# Patient Record
Sex: FEMALE | Race: WHITE | NOT HISPANIC OR LATINO | Employment: OTHER | URBAN - METROPOLITAN AREA
[De-identification: names, ages, dates, MRNs, and addresses within clinical notes are randomized per-mention and may not be internally consistent; named-entity substitution may affect disease eponyms.]

---

## 2018-12-24 ENCOUNTER — HOSPITAL ENCOUNTER (EMERGENCY)
Facility: HOSPITAL | Age: 73
Discharge: HOME | DRG: 202 | End: 2018-12-24
Attending: EMERGENCY MEDICINE
Payer: MEDICARE

## 2018-12-24 ENCOUNTER — APPOINTMENT (EMERGENCY)
Dept: RADIOLOGY | Facility: HOSPITAL | Age: 73
DRG: 202 | End: 2018-12-24
Attending: EMERGENCY MEDICINE
Payer: MEDICARE

## 2018-12-24 VITALS
WEIGHT: 170 LBS | OXYGEN SATURATION: 94 % | HEART RATE: 92 BPM | HEIGHT: 58 IN | BODY MASS INDEX: 35.68 KG/M2 | DIASTOLIC BLOOD PRESSURE: 68 MMHG | TEMPERATURE: 99.5 F | SYSTOLIC BLOOD PRESSURE: 132 MMHG | RESPIRATION RATE: 22 BRPM

## 2018-12-24 DIAGNOSIS — J40 BRONCHITIS: ICD-10-CM

## 2018-12-24 DIAGNOSIS — J44.1 COPD EXACERBATION (CMS/HCC): Primary | ICD-10-CM

## 2018-12-24 LAB
ANION GAP SERPL CALC-SCNC: 10 MEQ/L (ref 3–15)
APTT PPP: 41 SEC (ref 23–35)
BASOPHILS # BLD: 0.03 K/UL (ref 0.01–0.1)
BASOPHILS NFR BLD: 0.2 %
BUN SERPL-MCNC: 16 MG/DL (ref 8–20)
CALCIUM SERPL-MCNC: 8.7 MG/DL (ref 8.9–10.3)
CHLORIDE SERPL-SCNC: 101 MEQ/L (ref 98–109)
CO2 SERPL-SCNC: 25 MEQ/L (ref 22–32)
CREAT SERPL-MCNC: 0.6 MG/DL
DIFFERENTIAL METHOD BLD: ABNORMAL
DIGOXIN SERPL-MCNC: 0.4 NG/ML (ref 0.5–2)
EOSINOPHIL # BLD: 0.06 K/UL (ref 0.04–0.36)
EOSINOPHIL NFR BLD: 0.5 %
ERYTHROCYTE [DISTWIDTH] IN BLOOD BY AUTOMATED COUNT: 13.7 % (ref 11.7–14.4)
FLUAV RNA SPEC QL NAA+PROBE: NEGATIVE
FLUBV RNA SPEC QL NAA+PROBE: NEGATIVE
GFR SERPL CREATININE-BSD FRML MDRD: >60 ML/MIN/1.73M*2
GLUCOSE SERPL-MCNC: 287 MG/DL (ref 70–99)
HCT VFR BLDCO AUTO: 44.1 %
HGB BLD-MCNC: 14.7 G/DL
IMM GRANULOCYTES # BLD AUTO: 0.07 K/UL (ref 0–0.08)
IMM GRANULOCYTES NFR BLD AUTO: 0.6 %
INR PPP: 1.7 INR
LACTATE SERPL-SCNC: 1.5 MMOL/L (ref 0.4–2)
LYMPHOCYTES # BLD: 1.26 K/UL (ref 1.2–3.5)
LYMPHOCYTES NFR BLD: 10.1 %
MAGNESIUM SERPL-MCNC: 1.7 MG/DL (ref 1.8–2.5)
MCH RBC QN AUTO: 30.3 PG (ref 28–33.2)
MCHC RBC AUTO-ENTMCNC: 33.3 G/DL (ref 32.2–35.5)
MCV RBC AUTO: 90.9 FL (ref 83–98)
MONOCYTES # BLD: 1.02 K/UL (ref 0.28–0.8)
MONOCYTES NFR BLD: 8.2 %
NEUTROPHILS # BLD: 10.01 K/UL (ref 1.7–7)
NEUTS SEG NFR BLD: 80.4 %
NRBC BLD-RTO: 0 %
PDW BLD AUTO: 10.5 FL (ref 9.4–12.3)
PLATELET # BLD AUTO: 247 K/UL
POTASSIUM SERPL-SCNC: 4.3 MEQ/L (ref 3.6–5.1)
PROTHROMBIN TIME: 19 SEC (ref 12.2–14.5)
RBC # BLD AUTO: 4.85 M/UL (ref 3.93–5.22)
RSV RNA SPEC QL NAA+PROBE: NEGATIVE
SODIUM SERPL-SCNC: 136 MEQ/L (ref 136–144)
TROPONIN I SERPL-MCNC: <0.02 NG/ML
WBC # BLD AUTO: 12.45 K/UL

## 2018-12-24 PROCEDURE — 80048 BASIC METABOLIC PNL TOTAL CA: CPT | Performed by: EMERGENCY MEDICINE

## 2018-12-24 PROCEDURE — 36415 COLL VENOUS BLD VENIPUNCTURE: CPT | Performed by: EMERGENCY MEDICINE

## 2018-12-24 PROCEDURE — 83605 ASSAY OF LACTIC ACID: CPT | Performed by: EMERGENCY MEDICINE

## 2018-12-24 PROCEDURE — 99284 EMERGENCY DEPT VISIT MOD MDM: CPT | Mod: 25

## 2018-12-24 PROCEDURE — 25800000 HC PHARMACY IV SOLUTIONS: Performed by: EMERGENCY MEDICINE

## 2018-12-24 PROCEDURE — 85025 COMPLETE CBC W/AUTO DIFF WBC: CPT | Performed by: EMERGENCY MEDICINE

## 2018-12-24 PROCEDURE — 25000000 HC PHARMACY GENERAL: Performed by: EMERGENCY MEDICINE

## 2018-12-24 PROCEDURE — 83735 ASSAY OF MAGNESIUM: CPT | Performed by: EMERGENCY MEDICINE

## 2018-12-24 PROCEDURE — 87631 RESP VIRUS 3-5 TARGETS: CPT | Performed by: EMERGENCY MEDICINE

## 2018-12-24 PROCEDURE — 94644 CONT INHLJ TX 1ST HOUR: CPT

## 2018-12-24 PROCEDURE — 85730 THROMBOPLASTIN TIME PARTIAL: CPT | Performed by: EMERGENCY MEDICINE

## 2018-12-24 PROCEDURE — 94640 AIRWAY INHALATION TREATMENT: CPT

## 2018-12-24 PROCEDURE — 63600000 HC DRUGS/DETAIL CODE: Performed by: EMERGENCY MEDICINE

## 2018-12-24 PROCEDURE — 85610 PROTHROMBIN TIME: CPT | Performed by: EMERGENCY MEDICINE

## 2018-12-24 PROCEDURE — 71045 X-RAY EXAM CHEST 1 VIEW: CPT

## 2018-12-24 PROCEDURE — 87040 BLOOD CULTURE FOR BACTERIA: CPT | Mod: 91 | Performed by: EMERGENCY MEDICINE

## 2018-12-24 PROCEDURE — 80162 ASSAY OF DIGOXIN TOTAL: CPT | Performed by: EMERGENCY MEDICINE

## 2018-12-24 PROCEDURE — 84484 ASSAY OF TROPONIN QUANT: CPT | Performed by: EMERGENCY MEDICINE

## 2018-12-24 PROCEDURE — 93005 ELECTROCARDIOGRAM TRACING: CPT | Performed by: EMERGENCY MEDICINE

## 2018-12-24 RX ORDER — PREDNISONE 50 MG/1
50 TABLET ORAL DAILY
Qty: 5 TABLET | Refills: 0 | Status: SHIPPED | OUTPATIENT
Start: 2018-12-24 | End: 2018-12-28 | Stop reason: HOSPADM

## 2018-12-24 RX ORDER — ALBUTEROL SULFATE 90 UG/1
2 INHALANT RESPIRATORY (INHALATION) EVERY 4 HOURS PRN
Qty: 1 INHALER | Refills: 0 | Status: ON HOLD | OUTPATIENT
Start: 2018-12-24 | End: 2018-12-28

## 2018-12-24 RX ORDER — METFORMIN HYDROCHLORIDE 500 MG/1
1000 TABLET ORAL 2 TIMES DAILY WITH MEALS
COMMUNITY
Start: 2018-12-24

## 2018-12-24 RX ORDER — AZITHROMYCIN 250 MG/1
250 TABLET, FILM COATED ORAL DAILY
Qty: 4 TABLET | Refills: 0 | Status: SHIPPED | OUTPATIENT
Start: 2018-12-25 | End: 2018-12-28 | Stop reason: HOSPADM

## 2018-12-24 RX ORDER — WARFARIN 2.5 MG/1
2.5 TABLET ORAL 2 TIMES WEEKLY
COMMUNITY
Start: 2018-12-21

## 2018-12-24 RX ORDER — ALBUTEROL SULFATE 2.5 MG/.5ML
7.5 SOLUTION RESPIRATORY (INHALATION) CONTINUOUS
Status: DISPENSED | OUTPATIENT
Start: 2018-12-24 | End: 2018-12-24

## 2018-12-24 RX ORDER — SITAGLIPTIN 100 MG/1
100 TABLET, FILM COATED ORAL DAILY
COMMUNITY
Start: 2018-12-24

## 2018-12-24 RX ORDER — METOPROLOL SUCCINATE 50 MG/1
50 TABLET, EXTENDED RELEASE ORAL DAILY
COMMUNITY
Start: 2018-12-07

## 2018-12-24 RX ORDER — MONTELUKAST SODIUM 10 MG/1
10 TABLET ORAL EVERY EVENING
Refills: 3 | COMMUNITY
Start: 2018-10-25

## 2018-12-24 RX ORDER — IPRATROPIUM BROMIDE 0.5 MG/2.5ML
0.5 SOLUTION RESPIRATORY (INHALATION) CONTINUOUS
Status: DISCONTINUED | OUTPATIENT
Start: 2018-12-24 | End: 2018-12-24 | Stop reason: HOSPADM

## 2018-12-24 RX ORDER — ATORVASTATIN CALCIUM 10 MG/1
10 TABLET, FILM COATED ORAL
Refills: 5 | COMMUNITY
Start: 2018-10-30

## 2018-12-24 RX ORDER — DIGOXIN 125 UG/1
125 TABLET ORAL DAILY
COMMUNITY
Start: 2018-12-21

## 2018-12-24 RX ADMIN — SODIUM CHLORIDE 1000 ML: 9 INJECTION, SOLUTION INTRAVENOUS at 16:15

## 2018-12-24 RX ADMIN — AZITHROMYCIN DIHYDRATE 500 MG: 500 INJECTION, POWDER, LYOPHILIZED, FOR SOLUTION INTRAVENOUS at 16:11

## 2018-12-24 RX ADMIN — METHYLPREDNISOLONE SODIUM SUCCINATE 60 MG: 125 INJECTION, POWDER, FOR SOLUTION INTRAMUSCULAR; INTRAVENOUS at 16:12

## 2018-12-24 RX ADMIN — IPRATROPIUM BROMIDE 0.5 MG: 0.5 SOLUTION RESPIRATORY (INHALATION) at 15:57

## 2018-12-24 RX ADMIN — ALBUTEROL SULFATE 7.5 MG: 2.5 SOLUTION RESPIRATORY (INHALATION) at 15:55

## 2018-12-24 ASSESSMENT — ENCOUNTER SYMPTOMS
CHILLS: 0
HEADACHES: 0
VOMITING: 0
COUGH: 1
SORE THROAT: 0
DIARRHEA: 0
DIZZINESS: 0
NAUSEA: 0
WHEEZING: 1
FEVER: 0
DYSURIA: 0
WEAKNESS: 0
SHORTNESS OF BREATH: 1
JOINT SWELLING: 0
PALPITATIONS: 0
DIFFICULTY URINATING: 0
ARTHRALGIAS: 0
ABDOMINAL PAIN: 0
RHINORRHEA: 1

## 2018-12-24 NOTE — ED PROVIDER NOTES
HPI    73 y.o. female PMHx asthma presents to the ED for evaluation of SOB and cough. Pt reports of rhinorrhea (yellow drainage) x 2 days and a cough x 1 day with associated mucus. States that her sx are worsening which prompted her arrival to the ED. Also c/o SOB and wheezing. Pt has an inhaler which has . Denies NVD, weakness, abd pain, fever, chills or any other concerns.   Chief Complaint   Patient presents with   • Shortness of Breath   • Cough         History provided by:  Patient  Shortness of Breath   Severity:  Mild  Onset quality:  Gradual  Timing:  Constant  Progression:  Unchanged  Chronicity:  Recurrent  Context comment:  Hx of asthma   Relieved by:  Inhaler  Worsened by:  Nothing  Associated symptoms: cough and wheezing    Associated symptoms: no abdominal pain, no chest pain, no ear pain, no fever, no headaches, no rash, no sore throat and no vomiting    Cough   Associated symptoms: rhinorrhea, shortness of breath and wheezing    Associated symptoms: no chest pain, no chills, no ear pain, no fever, no headaches, no rash and no sore throat         Patient History     Past Medical History:   Diagnosis Date   • Asthma    • Atrial fibrillation (CMS/[facility])    • Type 2 diabetes mellitus (CMS/HCC) ([facility])        No past surgical history on file.    No family history on file.    Social History   Substance Use Topics   • Smoking status: Not on file   • Smokeless tobacco: Not on file   • Alcohol use Not on file       Systems Reviewed from Nursing Triage:          Review of Systems     Review of Systems   Constitutional: Negative for chills and fever.   HENT: Positive for rhinorrhea. Negative for ear pain and sore throat.    Respiratory: Positive for cough, shortness of breath and wheezing.    Cardiovascular: Negative for chest pain, palpitations and leg swelling.   Gastrointestinal: Negative for abdominal pain, diarrhea, nausea and vomiting.   Endocrine: Negative for polyuria.   Genitourinary: Negative for  difficulty urinating and dysuria.   Musculoskeletal: Negative for arthralgias and joint swelling.   Skin: Negative for rash.   Neurological: Negative for dizziness, weakness and headaches.        Physical Exam     ED Triage Vitals [18 1532]   Temp Heart Rate Resp BP SpO2   -- 97 (!) 21 (!) 160/88 94 %      Temp src Heart Rate Source Patient Position BP Location FiO2 (%) (Set)   -- Monitor Sitting Right upper arm --                     Patient Vitals for the past 24 hrs:   BP Pulse Resp SpO2   18 1532 (!) 160/88 97 (!) 21 94 %           Physical Exam   Constitutional: She is oriented to person, place, and time. She appears well-developed and well-nourished. No distress.   HENT:   Head: Normocephalic and atraumatic.   Mouth/Throat: Oropharynx is clear and moist.   Eyes: Conjunctivae and lids are normal.   Neck: Normal range of motion.   Cardiovascular: Normal rate, regular rhythm and normal heart sounds.    Pulmonary/Chest: Effort normal. She has decreased breath sounds (bilaterally ). She has wheezes (diffused expiratroy and prolonged expiratory phase ).   Neurological: She is alert and oriented to person, place, and time. She has normal strength.   Skin: Skin is warm and dry.   Nursing note and vitals reviewed.           Procedures    ED Course & MDM     Labs Reviewed - No data to display    No orders to display               MDM  Number of Diagnoses or Management Options  Bronchitis:   COPD exacerbation (CMS/[facility]) ([facility]):   Diagnosis management comments: She is a 73-year-old female with a past medical history of reactive airway disease presents with URI symptoms for 3-4 days.  Patient states increasing shortness of breath over the same period of time cough with productive sputum increasing wheezing shortness of breath.  Patient states that her last inhaler was drained and  did not have any current meds for wheezing over the weekend.  Gradually got worse came in today for evaluation.  Try to get  primary care for the office but secondary to holiday was unable to get in to see him.  On arrival patient with mild respiratory distress diffuse inspiratory and expiratory wheezing rhonchi.  Chest x-ray reviewed showed no focal infiltrates white blood cell count was mildly elevated flu and RSV testing were negative.  Patient was given IV antibiotics for bronchitis.  IV steroids for wheezing.  Patient was given hour-long neb in the ER.  At this point patient states feels much improved over baseline.  But states that it feels like almost back to normal at this point.  Patient still with some expiratory wheezing on exam.  Offered to admit patient to hospital patient states Egner holiday would like to be discharged home and will take medications as an outpatient.  Prescriptions for Zithromax prednisone and albuterol provided.  Patient was given strict instructions to return including increasing shortness of breath wheezing cough fever.  Patient understood reasons for returning and felt comfortable being discharged home.       Amount and/or Complexity of Data Reviewed  Clinical lab tests: reviewed  Tests in the radiology section of CPT®: reviewed  Independent visualization of images, tracings, or specimens: yes           Scribe Attestation  By signing my name below, IPauline, attest that this documentation has been prepared under the direction and in the presence of DRISS Xiao MD.  12/24/2018 3:37 PM    Provider Attestation  IDRISS, personally performed the services described in this documentation, as documented by the scribe in my presence, and it is both accurate and complete.  12/24/2018 3:52 PM      Clinical Impressions as of Dec 24 1752   COPD exacerbation (CMS/[facility]) ([facility])   Bronchitis        Pauline Murray  12/24/18 1540       Pauline Murray  12/24/18 1541       DRISS Xiao MD  12/24/18 1757

## 2018-12-26 ENCOUNTER — APPOINTMENT (EMERGENCY)
Dept: RADIOLOGY | Facility: HOSPITAL | Age: 73
DRG: 202 | End: 2018-12-26
Attending: EMERGENCY MEDICINE
Payer: MEDICARE

## 2018-12-26 ENCOUNTER — HOSPITAL ENCOUNTER (INPATIENT)
Facility: HOSPITAL | Age: 73
LOS: 2 days | Discharge: HOME | DRG: 202 | End: 2018-12-28
Attending: EMERGENCY MEDICINE | Admitting: HOSPITALIST
Payer: MEDICARE

## 2018-12-26 DIAGNOSIS — J44.1 COPD EXACERBATION (CMS/HCC): ICD-10-CM

## 2018-12-26 DIAGNOSIS — J20.9 ACUTE BRONCHITIS WITH BRONCHOSPASM: ICD-10-CM

## 2018-12-26 DIAGNOSIS — R06.02 SHORTNESS OF BREATH: Primary | ICD-10-CM

## 2018-12-26 PROBLEM — I48.91 A-FIB (CMS/HCC): Status: ACTIVE | Noted: 2018-12-26

## 2018-12-26 PROBLEM — E11.9 DM (DIABETES MELLITUS) (CMS/HCC): Status: ACTIVE | Noted: 2018-12-26

## 2018-12-26 PROBLEM — J45.909 ASTHMA: Status: ACTIVE | Noted: 2018-12-26

## 2018-12-26 LAB
ANION GAP SERPL CALC-SCNC: 11 MEQ/L (ref 3–15)
APTT PPP: 37 SEC (ref 23–35)
ATRIAL RATE: 258
BASOPHILS # BLD: 0.02 K/UL (ref 0.01–0.1)
BASOPHILS NFR BLD: 0.1 %
BNP SERPL-MCNC: 156 PG/ML
BUN SERPL-MCNC: 25 MG/DL (ref 8–20)
CALCIUM SERPL-MCNC: 9 MG/DL (ref 8.9–10.3)
CHLORIDE SERPL-SCNC: 100 MEQ/L (ref 98–109)
CO2 SERPL-SCNC: 26 MEQ/L (ref 22–32)
CREAT SERPL-MCNC: 0.6 MG/DL
DIFFERENTIAL METHOD BLD: ABNORMAL
EOSINOPHIL # BLD: 0.02 K/UL (ref 0.04–0.36)
EOSINOPHIL NFR BLD: 0.1 %
ERYTHROCYTE [DISTWIDTH] IN BLOOD BY AUTOMATED COUNT: 13.8 % (ref 11.7–14.4)
FLUAV RNA SPEC QL NAA+PROBE: NEGATIVE
FLUBV RNA SPEC QL NAA+PROBE: NEGATIVE
GFR SERPL CREATININE-BSD FRML MDRD: >60 ML/MIN/1.73M*2
GLUCOSE SERPL-MCNC: 241 MG/DL (ref 70–99)
HCT VFR BLDCO AUTO: 40.9 %
HGB BLD-MCNC: 13.3 G/DL
IMM GRANULOCYTES # BLD AUTO: 0.14 K/UL (ref 0–0.08)
IMM GRANULOCYTES NFR BLD AUTO: 0.9 %
INR PPP: 2.4 INR
LYMPHOCYTES # BLD: 1.33 K/UL (ref 1.2–3.5)
LYMPHOCYTES NFR BLD: 8.1 %
MAGNESIUM SERPL-MCNC: 1.9 MG/DL (ref 1.8–2.5)
MCH RBC QN AUTO: 30.2 PG (ref 28–33.2)
MCHC RBC AUTO-ENTMCNC: 32.5 G/DL (ref 32.2–35.5)
MCV RBC AUTO: 93 FL (ref 83–98)
MONOCYTES # BLD: 0.54 K/UL (ref 0.28–0.8)
MONOCYTES NFR BLD: 3.3 %
NEUTROPHILS # BLD: 14.37 K/UL (ref 1.7–7)
NEUTS SEG NFR BLD: 87.5 %
NRBC BLD-RTO: 0 %
PDW BLD AUTO: 10.3 FL (ref 9.4–12.3)
PLATELET # BLD AUTO: 276 K/UL
POTASSIUM SERPL-SCNC: 3.9 MEQ/L (ref 3.6–5.1)
PROTHROMBIN TIME: 25 SEC (ref 12.2–14.5)
QRS DURATION: 82
QT INTERVAL: 350
QTC CALCULATION(BAZETT): 446
R AXIS: 23
RBC # BLD AUTO: 4.4 M/UL (ref 3.93–5.22)
RSV RNA SPEC QL NAA+PROBE: NEGATIVE
SODIUM SERPL-SCNC: 137 MEQ/L (ref 136–144)
T WAVE AXIS: 3
TROPONIN I SERPL-MCNC: 0.02 NG/ML
VENTRICULAR RATE: 98
WBC # BLD AUTO: 16.42 K/UL

## 2018-12-26 PROCEDURE — 94644 CONT INHLJ TX 1ST HOUR: CPT

## 2018-12-26 PROCEDURE — 63600000 HC DRUGS/DETAIL CODE: Performed by: EMERGENCY MEDICINE

## 2018-12-26 PROCEDURE — 85730 THROMBOPLASTIN TIME PARTIAL: CPT | Performed by: EMERGENCY MEDICINE

## 2018-12-26 PROCEDURE — 87631 RESP VIRUS 3-5 TARGETS: CPT | Performed by: EMERGENCY MEDICINE

## 2018-12-26 PROCEDURE — 96374 THER/PROPH/DIAG INJ IV PUSH: CPT

## 2018-12-26 PROCEDURE — 25000000 HC PHARMACY GENERAL: Performed by: EMERGENCY MEDICINE

## 2018-12-26 PROCEDURE — 87040 BLOOD CULTURE FOR BACTERIA: CPT | Mod: 91 | Performed by: EMERGENCY MEDICINE

## 2018-12-26 PROCEDURE — 99285 EMERGENCY DEPT VISIT HI MDM: CPT | Mod: 25

## 2018-12-26 PROCEDURE — 93005 ELECTROCARDIOGRAM TRACING: CPT | Performed by: EMERGENCY MEDICINE

## 2018-12-26 PROCEDURE — 83735 ASSAY OF MAGNESIUM: CPT | Performed by: EMERGENCY MEDICINE

## 2018-12-26 PROCEDURE — 99223 1ST HOSP IP/OBS HIGH 75: CPT | Performed by: HOSPITALIST

## 2018-12-26 PROCEDURE — 83880 ASSAY OF NATRIURETIC PEPTIDE: CPT | Performed by: EMERGENCY MEDICINE

## 2018-12-26 PROCEDURE — 84484 ASSAY OF TROPONIN QUANT: CPT | Performed by: EMERGENCY MEDICINE

## 2018-12-26 PROCEDURE — 63700000 HC SELF-ADMINISTRABLE DRUG: Performed by: HOSPITALIST

## 2018-12-26 PROCEDURE — 36415 COLL VENOUS BLD VENIPUNCTURE: CPT | Performed by: EMERGENCY MEDICINE

## 2018-12-26 PROCEDURE — 25000000 HC PHARMACY GENERAL: Performed by: HOSPITALIST

## 2018-12-26 PROCEDURE — 71046 X-RAY EXAM CHEST 2 VIEWS: CPT

## 2018-12-26 PROCEDURE — 20600000 HC ROOM AND CARE INTERMEDIATE/TELEMETRY

## 2018-12-26 PROCEDURE — 85610 PROTHROMBIN TIME: CPT | Performed by: EMERGENCY MEDICINE

## 2018-12-26 PROCEDURE — 85025 COMPLETE CBC W/AUTO DIFF WBC: CPT | Performed by: EMERGENCY MEDICINE

## 2018-12-26 PROCEDURE — 94640 AIRWAY INHALATION TREATMENT: CPT

## 2018-12-26 PROCEDURE — 84295 ASSAY OF SERUM SODIUM: CPT | Performed by: EMERGENCY MEDICINE

## 2018-12-26 PROCEDURE — 63600000 HC DRUGS/DETAIL CODE: Performed by: HOSPITALIST

## 2018-12-26 RX ORDER — IPRATROPIUM BROMIDE 0.5 MG/2.5ML
0.5 SOLUTION RESPIRATORY (INHALATION) CONTINUOUS
Status: DISCONTINUED | OUTPATIENT
Start: 2018-12-26 | End: 2018-12-27

## 2018-12-26 RX ORDER — METOPROLOL SUCCINATE 50 MG/1
50 TABLET, EXTENDED RELEASE ORAL DAILY
Status: DISCONTINUED | OUTPATIENT
Start: 2018-12-26 | End: 2018-12-28 | Stop reason: HOSPADM

## 2018-12-26 RX ORDER — DIGOXIN 125 MCG
125 TABLET ORAL DAILY
Status: DISCONTINUED | OUTPATIENT
Start: 2018-12-26 | End: 2018-12-28 | Stop reason: HOSPADM

## 2018-12-26 RX ORDER — MONTELUKAST SODIUM 10 MG/1
10 TABLET ORAL EVERY EVENING
Status: DISCONTINUED | OUTPATIENT
Start: 2018-12-26 | End: 2018-12-28 | Stop reason: HOSPADM

## 2018-12-26 RX ORDER — WARFARIN 2.5 MG/1
3.75 TABLET ORAL
COMMUNITY

## 2018-12-26 RX ORDER — METOPROLOL SUCCINATE 50 MG/1
50 TABLET, EXTENDED RELEASE ORAL DAILY
Status: DISCONTINUED | OUTPATIENT
Start: 2018-12-27 | End: 2018-12-26

## 2018-12-26 RX ORDER — ATORVASTATIN CALCIUM 10 MG/1
10 TABLET, FILM COATED ORAL
Status: DISCONTINUED | OUTPATIENT
Start: 2018-12-27 | End: 2018-12-28 | Stop reason: HOSPADM

## 2018-12-26 RX ORDER — WARFARIN 2.5 MG/1
2.5 TABLET ORAL 2 TIMES WEEKLY
Status: DISCONTINUED | OUTPATIENT
Start: 2018-12-27 | End: 2018-12-28 | Stop reason: HOSPADM

## 2018-12-26 RX ORDER — ONDANSETRON HYDROCHLORIDE 2 MG/ML
4 INJECTION, SOLUTION INTRAVENOUS EVERY 8 HOURS PRN
Status: DISCONTINUED | OUTPATIENT
Start: 2018-12-26 | End: 2018-12-28 | Stop reason: HOSPADM

## 2018-12-26 RX ORDER — METFORMIN HYDROCHLORIDE 500 MG/1
1000 TABLET ORAL 2 TIMES DAILY WITH MEALS
Status: DISCONTINUED | OUTPATIENT
Start: 2018-12-27 | End: 2018-12-28 | Stop reason: HOSPADM

## 2018-12-26 RX ORDER — DEXTROSE 40 %
15-30 GEL (GRAM) ORAL AS NEEDED
Status: DISCONTINUED | OUTPATIENT
Start: 2018-12-26 | End: 2018-12-28 | Stop reason: HOSPADM

## 2018-12-26 RX ORDER — WARFARIN 2.5 MG/1
3.75 TABLET ORAL
Status: DISCONTINUED | OUTPATIENT
Start: 2018-12-26 | End: 2018-12-28 | Stop reason: HOSPADM

## 2018-12-26 RX ORDER — IPRATROPIUM BROMIDE AND ALBUTEROL SULFATE 2.5; .5 MG/3ML; MG/3ML
3 SOLUTION RESPIRATORY (INHALATION) EVERY 6 HOURS
Status: DISCONTINUED | OUTPATIENT
Start: 2018-12-26 | End: 2018-12-27

## 2018-12-26 RX ORDER — WARFARIN 2.5 MG/1
TABLET ORAL
Status: DISPENSED
Start: 2018-12-26 | End: 2018-12-27

## 2018-12-26 RX ORDER — ALBUTEROL SULFATE 2.5 MG/.5ML
7.5 SOLUTION RESPIRATORY (INHALATION) CONTINUOUS
Status: DISPENSED | OUTPATIENT
Start: 2018-12-26 | End: 2018-12-26

## 2018-12-26 RX ORDER — IBUPROFEN 200 MG
16-32 TABLET ORAL AS NEEDED
Status: DISCONTINUED | OUTPATIENT
Start: 2018-12-26 | End: 2018-12-28 | Stop reason: HOSPADM

## 2018-12-26 RX ORDER — DEXTROSE 50 % IN WATER (D50W) INTRAVENOUS SYRINGE
25 AS NEEDED
Status: DISCONTINUED | OUTPATIENT
Start: 2018-12-26 | End: 2018-12-28 | Stop reason: HOSPADM

## 2018-12-26 RX ORDER — DIGOXIN 125 MCG
125 TABLET ORAL DAILY
Status: DISCONTINUED | OUTPATIENT
Start: 2018-12-27 | End: 2018-12-26

## 2018-12-26 RX ADMIN — IPRATROPIUM BROMIDE AND ALBUTEROL SULFATE 3 ML: 2.5; .5 SOLUTION RESPIRATORY (INHALATION) at 19:42

## 2018-12-26 RX ADMIN — WARFARIN SODIUM 3.75 MG: 2.5 TABLET ORAL at 23:38

## 2018-12-26 RX ADMIN — ALBUTEROL SULFATE 7.5 MG: 2.5 SOLUTION RESPIRATORY (INHALATION) at 12:58

## 2018-12-26 RX ADMIN — METOPROLOL SUCCINATE 50 MG: 50 TABLET, EXTENDED RELEASE ORAL at 23:35

## 2018-12-26 RX ADMIN — METHYLPREDNISOLONE SODIUM SUCCINATE 40 MG: 40 INJECTION, POWDER, FOR SOLUTION INTRAMUSCULAR; INTRAVENOUS at 23:35

## 2018-12-26 RX ADMIN — METHYLPREDNISOLONE SODIUM SUCCINATE 60 MG: 125 INJECTION, POWDER, FOR SOLUTION INTRAMUSCULAR; INTRAVENOUS at 15:04

## 2018-12-26 RX ADMIN — IPRATROPIUM BROMIDE AND ALBUTEROL SULFATE 3 ML: 2.5; .5 SOLUTION RESPIRATORY (INHALATION) at 23:34

## 2018-12-26 RX ADMIN — MONTELUKAST SODIUM 10 MG: 10 TABLET, FILM COATED ORAL at 23:35

## 2018-12-26 RX ADMIN — DIGOXIN 125 MCG: 125 TABLET ORAL at 23:35

## 2018-12-26 RX ADMIN — IPRATROPIUM BROMIDE 0.5 MG: 0.5 SOLUTION RESPIRATORY (INHALATION) at 13:00

## 2018-12-26 ASSESSMENT — ENCOUNTER SYMPTOMS
CONFUSION: 0
BACK PAIN: 0
EYE REDNESS: 0
VOMITING: 0
DIARRHEA: 0
NUMBNESS: 0
COUGH: 1
SHORTNESS OF BREATH: 1
PALPITATIONS: 0
CHILLS: 0
HEADACHES: 0
RHINORRHEA: 1
NAUSEA: 0
WHEEZING: 1
LIGHT-HEADEDNESS: 0
DIZZINESS: 0
ABDOMINAL PAIN: 0
FEVER: 0
NECK PAIN: 0

## 2018-12-26 NOTE — ED PROVIDER NOTES
HPI     Chief Complaint   Patient presents with   • Shortness of Breath       73-year-old female presents emergency department for evaluation of 5 days of cough, rhinorrhea, progressive shortness of breath and wheezing.  Patient was seen in the emergency department on 12-24 and diagnosed with bronchitis.  Started on Zithromax, prednisone and inhaler without significant improvement in her symptoms.  Patient states that her shortness of breath with exertion has worsened over the last 24 hours.  Patient denies any fever.  No nausea or vomiting.  There is no dizziness.  Patient denies any leg pain or swelling.  There is no abdominal pain.             Patient History     Past Medical History:   Diagnosis Date   • Asthma    • Atrial fibrillation (CMS/HCC)    • COPD (chronic obstructive pulmonary disease) (CMS/HCC) (HCC)    • Diverticulitis     ruptured   • Type 2 diabetes mellitus (CMS/HCC) (HCC)        Past Surgical History:   Procedure Laterality Date   • CATARACT EXTRACTION, BILATERAL     • HERNIA REPAIR     • LAPAROSCOPIC COLON RESECTION         History reviewed. No pertinent family history.    Social History   Substance Use Topics   • Smoking status: Never Smoker   • Smokeless tobacco: Never Used   • Alcohol use Yes      Comment: socially       Systems Reviewed from Nursing Triage:          Review of Systems     Review of Systems   Constitutional: Negative for chills and fever.   HENT: Positive for rhinorrhea.    Eyes: Negative for redness.   Respiratory: Positive for cough, shortness of breath and wheezing.    Cardiovascular: Positive for chest pain. Negative for palpitations and leg swelling.   Gastrointestinal: Negative for abdominal pain, diarrhea, nausea and vomiting.   Musculoskeletal: Negative for back pain and neck pain.   Skin: Negative for pallor and rash.   Neurological: Negative for dizziness, light-headedness, numbness and headaches.   Psychiatric/Behavioral: Negative for confusion.        Physical Exam      ED Triage Vitals   Temp Heart Rate Resp BP SpO2   12/26/18 1220 12/26/18 1220 12/26/18 1220 12/26/18 1222 12/26/18 1220   37.4 °C (99.3 °F) 80 (!) 21 (!) 155/79 (!) 91 %      Temp Source Heart Rate Source Patient Position BP Location FiO2 (%) (Set)   12/26/18 1220 12/26/18 1612 12/26/18 1220 12/26/18 1220 12/26/18 1303   Oral Monitor Sitting Left upper arm 21 %       Pulse Ox %: 92 % (12/26/18 1628)  Pulse Ox Interpretation: Low (12/26/18 1628)  Heart Rate: 91 (12/26/18 1628)  Rhythm Strip Interpretation: Normal Sinus Rhythm (12/26/18 1628)    Patient Vitals for the past 24 hrs:   BP Temp Temp src Pulse Resp SpO2   12/26/18 1612 (!) 151/93 36.7 °C (98.1 °F) Oral 87 (!) 22 92 %   12/26/18 1501 (!) 153/95 - - 98 (!) 24 93 %   12/26/18 1330 (!) 156/81 - - 94 (!) 21 97 %   12/26/18 1222 (!) 155/79 - - - - -   12/26/18 1220 - 37.4 °C (99.3 °F) Oral 80 (!) 21 (!) 91 %           Physical Exam   Constitutional: She appears well-developed. No distress.   HENT:   Mouth/Throat: Oropharynx is clear and moist.   Eyes: Conjunctivae are normal.   Neck: No JVD present.   Cardiovascular: Normal rate, regular rhythm, normal heart sounds and intact distal pulses.    No murmur heard.  Pulmonary/Chest: Effort normal. No respiratory distress. She has wheezes. She has no rales.   Abdominal: Soft. She exhibits no distension. There is no tenderness. There is no guarding.   Musculoskeletal: Normal range of motion. She exhibits no edema or tenderness.   Neurological: She is alert. No cranial nerve deficit.   Skin: Skin is warm. No rash noted. She is not diaphoretic.            Procedures    ED Course & MDM     Labs Reviewed   BASIC METABOLIC PANEL - Abnormal        Result Value    Sodium 137      Potassium 3.9      Chloride 100      CO2 26      BUN 25 (*)     Creatinine 0.6      Glucose 241 (*)     Calcium 9.0      eGFR >60.0      Anion Gap 11     PTT - Abnormal     PTT 37 (*)    PROTIME-INR - Abnormal     PT 25.0 (*)     INR 2.4     CBC  - Abnormal     WBC 16.42 (*)     RBC 4.40      Hemoglobin 13.3      Hematocrit 40.9      MCV 93.0      MCH 30.2      MCHC 32.5      RDW 13.8      Platelets 276      MPV 10.3     DIFF COUNT - Abnormal     Differential Type Auto      nRBC 0.0      Immature Granulocytes 0.9      Neutrophils 87.5      Lymphocytes 8.1      Monocytes 3.3      Eosinophils 0.1      Basophils 0.1      Immature Granulocytes, Absolute 0.14 (*)     Neutrophils, Absolute 14.37 (*)     Lymphocytes, Absolute 1.33      Monocytes, Absolute 0.54      Eosinophils, Absolute 0.02 (*)     Basophils, Absolute 0.02     B-TYPE NATRIURETIC PEPTIDE - Abnormal      (*)    RSV AND INFLUENZA A/B NUCLEIC ACIDS, PCR - Normal    Influenza A Negative      Influenza B Negative      Respiratory Syncytial Virus Negative     TROPONIN I - Normal    Troponin I 0.02     MAGNESIUM - Normal    Magnesium 1.9     BLOOD CULTURE    Narrative:     The following orders were created for panel order Blood culture.  Procedure                               Abnormality         Status                     ---------                               -----------         ------                     Blood Culture[[id number]]                                     In process                   Please view results for these tests on the individual orders.   BLOOD CULTURE    Narrative:     The following orders were created for panel order Blood culture.  Procedure                               Abnormality         Status                     ---------                               -----------         ------                     Blood Culture[[id number]]                                     In process                   Please view results for these tests on the individual orders.   BLOOD CULTURE   BLOOD CULTURE   CBC AND DIFFERENTIAL    Narrative:     The following orders were created for panel order CBC and Differential.  Procedure                               Abnormality         Status                      ---------                               -----------         ------                     CBC[[id number]]                           Abnormal            Final result               Diff Count[[id number]]                    Abnormal            Final result                 Please view results for these tests on the individual orders.       X-RAY CHEST 2 VIEWS   Final Result   IMPRESSION: No acute disease in the chest.      ECG 12 lead   ED Interpretation   A. fib 80 bpm.  Inferior Q waves.  Slightly delayed R wave progression.                        [facility]         ED Course as of Dec 26 1648   Wed Dec 26, 2018   1537 Patient states she is feeling much better.  Patient with minimal expiratory wheezes on auscultation.  Patient's chest x-ray without pneumonia.  Flu swab was negative.  Slightly elevated white count and glucose but patient is on steroids.  Patient's pulse ox anywhere from 90-94%.  Will ambulate to evaluate for any signs of hypoxia.  [MR]   1627 Pt w sob on exertion and pulse ox dipped to 89%. Will admit for nebs and steroids  [MR]   1628 Dr Diamond accepts pt to [facility]  [MR]      ED Course User Index  [MR] Kenny Smith, DO         Clinical Impressions as of Dec 26 1648   Shortness of breath   COPD exacerbation (CMS/HCC) (HCC)   Acute bronchitis with bronchospasm        Kenny Smith, DO  12/26/18 1648

## 2018-12-27 PROBLEM — J45.901 ASTHMA WITH ACUTE EXACERBATION: Status: ACTIVE | Noted: 2018-12-26

## 2018-12-27 PROBLEM — J44.1 COPD EXACERBATION (CMS/HCC): Status: ACTIVE | Noted: 2018-12-26

## 2018-12-27 PROBLEM — E87.5 HYPERKALEMIA: Status: ACTIVE | Noted: 2018-12-27

## 2018-12-27 LAB
ALBUMIN SERPL-MCNC: 3.2 G/DL (ref 3.4–5)
ALP SERPL-CCNC: 83 IU/L (ref 35–126)
ALT SERPL-CCNC: 30 IU/L (ref 11–54)
ANION GAP SERPL CALC-SCNC: 7 MEQ/L (ref 3–15)
AST SERPL-CCNC: 39 IU/L (ref 15–41)
ATRIAL RATE: 77
BILIRUB SERPL-MCNC: 0.6 MG/DL (ref 0.3–1.2)
BUN SERPL-MCNC: 23 MG/DL (ref 8–20)
CALCIUM SERPL-MCNC: 8.7 MG/DL (ref 8.9–10.3)
CHLORIDE SERPL-SCNC: 103 MEQ/L (ref 98–109)
CO2 SERPL-SCNC: 30 MEQ/L (ref 22–32)
CREAT SERPL-MCNC: 0.5 MG/DL
ERYTHROCYTE [DISTWIDTH] IN BLOOD BY AUTOMATED COUNT: 13.6 % (ref 11.7–14.4)
EST. AVERAGE GLUCOSE BLD GHB EST-MCNC: 220 MG/DL
GFR SERPL CREATININE-BSD FRML MDRD: >60 ML/MIN/1.73M*2
GLUCOSE BLD-MCNC: 205 MG/DL (ref 70–99)
GLUCOSE BLD-MCNC: 247 MG/DL (ref 70–99)
GLUCOSE BLD-MCNC: 289 MG/DL (ref 70–99)
GLUCOSE BLD-MCNC: 414 MG/DL (ref 70–99)
GLUCOSE BLD-MCNC: 432 MG/DL (ref 70–99)
GLUCOSE SERPL-MCNC: 311 MG/DL (ref 70–99)
HBA1C MFR BLD HPLC: 9.3 %
HCT VFR BLDCO AUTO: 40.7 %
HGB BLD-MCNC: 12.9 G/DL
MAGNESIUM SERPL-MCNC: 2 MG/DL (ref 1.8–2.5)
MCH RBC QN AUTO: 29.6 PG (ref 28–33.2)
MCHC RBC AUTO-ENTMCNC: 31.7 G/DL (ref 32.2–35.5)
MCV RBC AUTO: 93.3 FL (ref 83–98)
PDW BLD AUTO: 10.8 FL (ref 9.4–12.3)
PLATELET # BLD AUTO: 267 K/UL
POCT TEST: ABNORMAL
POTASSIUM SERPL-SCNC: 5.3 MEQ/L (ref 3.6–5.1)
PROT SERPL-MCNC: 6.4 G/DL (ref 6–8.2)
QRS DURATION: 84
QT INTERVAL: 338
QTC CALCULATION(BAZETT): 392
R AXIS: 19
RBC # BLD AUTO: 4.36 M/UL (ref 3.93–5.22)
SODIUM SERPL-SCNC: 140 MEQ/L (ref 136–144)
T WAVE AXIS: 7
VENTRICULAR RATE: 81
WBC # BLD AUTO: 9.37 K/UL

## 2018-12-27 PROCEDURE — 63700000 HC SELF-ADMINISTRABLE DRUG: Performed by: PHYSICIAN ASSISTANT

## 2018-12-27 PROCEDURE — 63600000 HC DRUGS/DETAIL CODE: Performed by: HOSPITALIST

## 2018-12-27 PROCEDURE — 80053 COMPREHEN METABOLIC PANEL: CPT | Performed by: HOSPITALIST

## 2018-12-27 PROCEDURE — 85027 COMPLETE CBC AUTOMATED: CPT | Performed by: HOSPITALIST

## 2018-12-27 PROCEDURE — 20600000 HC ROOM AND CARE INTERMEDIATE/TELEMETRY

## 2018-12-27 PROCEDURE — 94667 MNPJ CHEST WALL 1ST: CPT

## 2018-12-27 PROCEDURE — 25000000 HC PHARMACY GENERAL: Performed by: HOSPITALIST

## 2018-12-27 PROCEDURE — 63700000 HC SELF-ADMINISTRABLE DRUG: Performed by: HOSPITALIST

## 2018-12-27 PROCEDURE — 83735 ASSAY OF MAGNESIUM: CPT | Performed by: HOSPITALIST

## 2018-12-27 PROCEDURE — 99233 SBSQ HOSP IP/OBS HIGH 50: CPT | Performed by: HOSPITALIST

## 2018-12-27 PROCEDURE — 36415 COLL VENOUS BLD VENIPUNCTURE: CPT | Performed by: HOSPITALIST

## 2018-12-27 PROCEDURE — 83036 HEMOGLOBIN GLYCOSYLATED A1C: CPT | Performed by: HOSPITALIST

## 2018-12-27 PROCEDURE — 25000000 HC PHARMACY GENERAL: Performed by: PHYSICIAN ASSISTANT

## 2018-12-27 RX ORDER — INSULIN ASPART 100 [IU]/ML
0-12 INJECTION, SOLUTION INTRAVENOUS; SUBCUTANEOUS
Status: DISCONTINUED | OUTPATIENT
Start: 2018-12-27 | End: 2018-12-28 | Stop reason: HOSPADM

## 2018-12-27 RX ORDER — INSULIN ASPART 100 [IU]/ML
14 INJECTION, SOLUTION INTRAVENOUS; SUBCUTANEOUS ONCE
Status: COMPLETED | OUTPATIENT
Start: 2018-12-27 | End: 2018-12-27

## 2018-12-27 RX ORDER — GUAIFENESIN 600 MG/1
600 TABLET, EXTENDED RELEASE ORAL 2 TIMES DAILY
Status: DISCONTINUED | OUTPATIENT
Start: 2018-12-27 | End: 2018-12-28 | Stop reason: HOSPADM

## 2018-12-27 RX ORDER — IPRATROPIUM BROMIDE 0.5 MG/2.5ML
0.5 SOLUTION RESPIRATORY (INHALATION) EVERY 6 HOURS
Status: DISCONTINUED | OUTPATIENT
Start: 2018-12-27 | End: 2018-12-28 | Stop reason: HOSPADM

## 2018-12-27 RX ADMIN — IPRATROPIUM BROMIDE 0.5 MG: 0.5 SOLUTION RESPIRATORY (INHALATION) at 17:32

## 2018-12-27 RX ADMIN — IPRATROPIUM BROMIDE 0.5 MG: 0.5 SOLUTION RESPIRATORY (INHALATION) at 23:29

## 2018-12-27 RX ADMIN — INSULIN ASPART 6 UNITS: 100 INJECTION, SOLUTION INTRAVENOUS; SUBCUTANEOUS at 21:36

## 2018-12-27 RX ADMIN — METHYLPREDNISOLONE SODIUM SUCCINATE 40 MG: 40 INJECTION, POWDER, FOR SOLUTION INTRAMUSCULAR; INTRAVENOUS at 21:36

## 2018-12-27 RX ADMIN — WARFARIN SODIUM 2.5 MG: 2.5 TABLET ORAL at 17:33

## 2018-12-27 RX ADMIN — IPRATROPIUM BROMIDE AND ALBUTEROL SULFATE 3 ML: 2.5; .5 SOLUTION RESPIRATORY (INHALATION) at 06:32

## 2018-12-27 RX ADMIN — IPRATROPIUM BROMIDE 0.5 MG: 0.5 SOLUTION RESPIRATORY (INHALATION) at 11:22

## 2018-12-27 RX ADMIN — INSULIN ASPART 6 UNITS: 100 INJECTION, SOLUTION INTRAVENOUS; SUBCUTANEOUS at 16:56

## 2018-12-27 RX ADMIN — METHYLPREDNISOLONE SODIUM SUCCINATE 40 MG: 40 INJECTION, POWDER, FOR SOLUTION INTRAMUSCULAR; INTRAVENOUS at 06:32

## 2018-12-27 RX ADMIN — GUAIFENESIN 600 MG: 600 TABLET, EXTENDED RELEASE ORAL at 20:05

## 2018-12-27 RX ADMIN — GUAIFENESIN 600 MG: 600 TABLET, EXTENDED RELEASE ORAL at 11:22

## 2018-12-27 RX ADMIN — MONTELUKAST SODIUM 10 MG: 10 TABLET, FILM COATED ORAL at 17:33

## 2018-12-27 RX ADMIN — INSULIN ASPART 8 UNITS: 100 INJECTION, SOLUTION INTRAVENOUS; SUBCUTANEOUS at 08:23

## 2018-12-27 RX ADMIN — METFORMIN HYDROCHLORIDE 1000 MG: 500 TABLET ORAL at 08:22

## 2018-12-27 RX ADMIN — SITAGLIPTIN 100 MG: 25 TABLET, FILM COATED ORAL at 08:22

## 2018-12-27 RX ADMIN — INSULIN ASPART 14 UNITS: 100 INJECTION, SOLUTION INTRAVENOUS; SUBCUTANEOUS at 12:05

## 2018-12-27 RX ADMIN — METFORMIN HYDROCHLORIDE 1000 MG: 500 TABLET ORAL at 16:56

## 2018-12-27 RX ADMIN — ATORVASTATIN CALCIUM 10 MG: 10 TABLET, FILM COATED ORAL at 20:05

## 2018-12-27 ASSESSMENT — ENCOUNTER SYMPTOMS
CHILLS: 0
ABDOMINAL DISTENTION: 0
ABDOMINAL PAIN: 0
SINUS PAIN: 0
LIGHT-HEADEDNESS: 0
VOMITING: 0
PALPITATIONS: 0
DIARRHEA: 0
FATIGUE: 0
CHEST TIGHTNESS: 0
WHEEZING: 1
BACK PAIN: 0
UNEXPECTED WEIGHT CHANGE: 0
WEAKNESS: 0
HEADACHES: 0
APPETITE CHANGE: 0
RHINORRHEA: 1
SINUS PRESSURE: 0
FEVER: 0
SORE THROAT: 0
ACTIVITY CHANGE: 0
MYALGIAS: 0
NAUSEA: 0
TROUBLE SWALLOWING: 0
ARTHRALGIAS: 0
COUGH: 1
CONSTIPATION: 0
DIZZINESS: 0
DIAPHORESIS: 0
SHORTNESS OF BREATH: 1

## 2018-12-27 NOTE — ASSESSMENT & PLAN NOTE
Continue atrovent  Dc on inhalers  Pulm input appreciated  Wean steroids-- po taper  Will give Rx for atrovent prn and neb and inhalers  O2, wean as able.  Amb pulse ox wnl  No abx  Monitor symptoms

## 2018-12-27 NOTE — ASSESSMENT & PLAN NOTE
Stable heart rate  Continue digoxin/beta-blockers and Lipitor  Continue anticoagulation with Coumadin with goal INR 2-3

## 2018-12-27 NOTE — PROGRESS NOTES
Hospital Medicine Service -  Daily Progress Note       SUBJECTIVE   Interval History: No events overnight.  Pt s+e, sitting upright in chair.  The patient is extremely talkative.  No signs of resp distress.  States she has had increased dyspnea recently.  Denies chest pain. She is aware of tx plan.  All questions answered. All other ROS-.  Case d/w RN and old records reviewed.     OBJECTIVE      Vital signs in last 24 hours:  Temp:  [36.3 °C (97.3 °F)-37.6 °C (99.6 °F)] 37.6 °C (99.6 °F)  Heart Rate:  [68-98] 78  Resp:  [18-24] 18  BP: (132-156)/(58-95) 140/58    Intake/Output Summary (Last 24 hours) at 12/27/18 1457  Last data filed at 12/27/18 1320   Gross per 24 hour   Intake              480 ml   Output                0 ml   Net              480 ml       PHYSICAL EXAMINATION      Physical Exam   Constitutional: She is oriented to person, place, and time. She appears well-developed and well-nourished. She is cooperative.   HENT:   Head: Normocephalic and atraumatic.   Eyes: Conjunctivae are normal. No scleral icterus.   Neck: Neck supple.   Cardiovascular: An irregularly irregular rhythm present.   Pulmonary/Chest: She has decreased breath sounds. She has wheezes.   Abdominal: Soft. Normal appearance and bowel sounds are normal. There is no tenderness.   Neurological: She is alert and oriented to person, place, and time.   Skin: Skin is warm, dry and intact.   Psychiatric: She has a normal mood and affect. Her behavior is normal. Thought content normal. Her speech is rapid and/or pressured. Cognition and memory are normal. She expresses impulsivity.   Nursing note and vitals reviewed.     LINES, CATHETERS, DRAINS, AIRWAYS, AND WOUNDS   Lines, Drains, Airways, Wounds:  Peripheral IV 12/26/18 Left Antecubital (Active)   Number of days: 1       Comments:      LABS / IMAGING / TELE      Labs  K 5.3    Imaging  Not applicable    ECG/Telemetry  a fib    ASSESSMENT AND PLAN      COPD exacerbation (CMS/[facility]) ([facility])    Assessment & Plan    Pulm input appreciated  Continue steroids, nebs  Will need outpt Pulm follow-up          * Asthma with acute exacerbation   Assessment & Plan    Continue atrovent  Patient is not using inhalers for last couple months  Pulm input appreciated  Wean steroids  O2, wean as able.  Check amb pulse ox  No abx  Monitor symptoms        Hyperkalemia   Assessment & Plan    K 5.3, not mentioned if hemolyzed    Low K diet  Monitor on tele  Recheck bmp in am        DM (diabetes mellitus) (CMS/[facility]) ([facility])   Assessment & Plan    Continue home medication including Januvia and metformin  Hyperglycemic this am    Seen by nutrition  Check A1C  Likely elevated 2/2 steroids  SSI, continue outpt meds        A-fib (CMS/[facility]) ([facility])   Assessment & Plan    Stable heart rate  Continue digoxin/beta-blockers and Lipitor  Continue anticoagulation with Coumadin with goal INR 2-3               VTE Assessment: [place] VTE Score: 3  VTE Prophylaxis Plan: coumadin  Code Status: Full Code  Estimated Discharge Date: 12/28/2018  Disposition Planning: home with home care     Shelley Wilcox, DO  12/27/2018

## 2018-12-27 NOTE — CONSULTS
Nutrition Assessment: Provider Cx for Diet education  completed     Recommendations/Interventions:  1- RD added 1800 calories consistent CHO, in view of hyperglycemia, to current Cardiac, 2 gm K+    2 - Discuused heart healthy and CHO counting with pt and referred to [facility] Nutrition Service.     Monitor:  Percent PO intake  Accuchecks, A1C  Weights  Skin integrity    Goals: BG control < 140      Clinical Course: Patient is a 73 y.o. female who was admitted on 12/26/2018 with a diagnosis of Shortness of breath [R06.02]  Acute bronchitis with bronchospasm [J20.9]  COPD exacerbation (CMS/HCC) ([facility]) [J44.1].       Clinical Comments:  73 yr old female presents to the ED with complaints of shortness of breath for 5 days.  On 12/22/18 patient woke up with persistent rhinorrhea of thick tan/yellow mucus.      [facility] xray: 12/26:Two views of the chest again demonstrate mild interstitial prominence at the bases. No effusion. No consolidative process seen; no pneumothorax. The heart size is normal.      - Pt interviewed, no problems with appetite, denies unexpected weight change, no N/V/D/C. Pt tells that she has attended Outpt DM classes twice. She has a CHO counting book,  but has just be lax about strict diet adherence. Reviewed CHO counting with pt and provided  colorful materials.  Demonstrated awareness of heart healthy foods.  - Pt also verbalized awareness of what leafy green vegetables to keep consistent in diet while on Coumadin.     Diet: Cardiac, 2 gm K+, 1800 calories consistent CHO pt eating >75% of meals.      Skin: Intact, no wounds, no edema    Labs: K+ 5.3 High,  High, BUN 23 High  Accucheck: 289 High (DMT2 + steroids)    Meds :Pt takes Warfarin for atrial fibrillation; Prednisone, Metformin, Digoxin    Weights: 170# 8oz - stable, denied wt loss     Nutrition Focused Physical Assessment: well nourished, well developed, BMI 24.46 WNL    Nutrition Level Low : uncontrolled DM on steroids (3)       Past Medical  "History:   Diagnosis Date   • Asthma    • Atrial fibrillation (CMS/HCC)    • COPD (chronic obstructive pulmonary disease) (CMS/HCC) (HCC)    • Diverticulitis     ruptured   • Type 2 diabetes mellitus (CMS/HCC) (HCC)      Past Surgical History:   Procedure Laterality Date   • CATARACT EXTRACTION, BILATERAL     • HERNIA REPAIR     • LAPAROSCOPIC COLON RESECTION              Dietary Orders            Start     Ordered    12/27/18 0926  Adult Diet Regular; Consistent Carbohydrate 1800; 2gm Potassium; Cardiac (Low Sodium/Low Fat); RD/LDN may adjust order  Diet effective now     Question Answer Comment   Diet Texture Regular    Diabetic Restrictions: Consistent Carbohydrate 1800    Renal Restrictions: 2gm Potassium    Other Restriction(s): Cardiac (Low Sodium/Low Fat)    Delegation of Authority. Diet orders written by PA/CRNPs may not be adjusted by RD/LDNs. RD/LDN may adjust order        12/27/18 0926          General Information  Reason for Consult: Provider order, RN request     [facility] Nutrition Screen Tool  Has patient lost weight without trying?: 0-->No  If yes,how much weight has been lost?: 0-->Patient has not lost weight  Has patient been eating poorly due to decreased appetite?: 0-->No  [facility] Nutrition Screen Score: 0     Nutrition/Diet History  Patient Reported Diet: carbohydrate counting, general, heart healthy  Appetite Prior to Admission: Fxrmgokno-%  Intake (%): 75%  Cultural/Moravian Preferences: None  Meal/Snack Patterns: 2-3 meals, \"I need to cut out my sweet desserts.\"  Supplemental Drinks/Foods/Additives: none  Typical Activity Patterns: sedentary  Functional Status: able to prepare meals, able to purchase food, ambulatory  Factors Affecting Nutritional Intake:  (none)           Physical Appearance  Last Bowel Movement: 12/25/18     Nutrition Order Review  Nutrition Order Review: does not meet nutritional requirements  Nutrition Order Review Comments: RD added 1800 kcal DM restriction with DMT2 + " "steroids     Anthropometrics  Height: 177.8 cm (5' 10\")           Current Weight  Weight: 77.3 kg (170 lb 8 oz)     Ideal Body Weight (IBW)  Ideal Body Weight (IBW) (kg): 68.72  % Ideal Body Weight: 115.45            Body Mass Index (BMI)  BMI (Calculated): 24.5  BMI Assessment: BMI 18.5-24.9: normal                                                                                                                                        Medications reviewed as below:     atorvastatin 10 mg oral Daily (6a)   digoxin 125 mcg oral Daily   guaiFENesin 600 mg oral BID   insulin aspart U-100 0-12 Units subcutaneous With meals & nightly   ipratropium 0.5 mg nebulization q6h MAGAN   metFORMIN 1,000 mg oral BID with meals   methylPREDNISolone sod suc(PF) 40 mg intravenous q8h MAGAN   metoprolol succinate XL 50 mg oral Daily   montelukast 10 mg oral q PM   SITagliptin 100 mg oral Daily   warfarin 2.5 mg oral Once per day on Mon Thu   warfarin 3.75 mg oral Once per day on Sun Tue Wed Fri Sat          Weights (last 7 days)     Date/Time   Weight    12/27/18 0400  77.3 kg (170 lb 8 oz)    12/26/18 2116  79.3 kg (174 lb 14.4 oz)              No results found for: HGBA1C    CMP Results       12/27/18 12/26/18 12/24/18                    0449 1351 1553          137 136         K 5.3 (H) 3.9 4.3         Cl 103 100 101         CO2 30 26 25         Glucose 311 (H) 241 (H) 287 (H)         BUN 23 (H) 25 (H) 16         Creatinine 0.5 (L) 0.6 0.6         Calcium 8.7 (L) 9.0 8.7 (L)         Anion Gap 7 11 10         AST 39 - -         ALT 30 - -         Albumin 3.2 (L) - -         EGFR &gt;60.0 &gt;60.0 &gt;60.0         Comment for K at 1351 on 12/26/18:    Results obtained on plasma. Plasma Potassium values may be up to 0.4 mEQ/L less than serum values. The differences may be greater for patients with high platelet or white cell counts.    Comment for K at 1553 on 12/24/18:    Results obtained on plasma. Plasma Potassium values may be up to " 0.4 mEQ/L less than serum values. The differences may be greater for patients with high platelet or white cell counts.  SLIGHT HEMOLYSIS, RESULT MAY BE INCREASED.          20 Kcal/Kg (kcal): 1546.76  25 Kcal/Kg (kcal): 1933.45          PES  Statement: PES Statement  Nutrition Diagnosis: Limited Adherence To Nutrition Related Recommendations  Related To:: not counting CHO  As Evidenced By::  // A1c ordered  Nutritional Needs Met?: Yes  Nutrition Status Classification: Mild nutritional compromise                                        Date: 12/27/18  Signature: Sarah Melgar RD

## 2018-12-27 NOTE — PLAN OF CARE
Problem: Patient Care Overview  Goal: Plan of Care Review  Outcome: Ongoing (interventions implemented as appropriate)      Problem: Breathing Pattern Ineffective (Adult)  Goal: Identify Related Risk Factors and Signs and Symptoms  Outcome: Ongoing (interventions implemented as appropriate)    Goal: Effective Oxygenation/Ventilation  Outcome: Ongoing (interventions implemented as appropriate)    Goal: Anxiety/Fear Reduction  Outcome: Ongoing (interventions implemented as appropriate)

## 2018-12-27 NOTE — H&P
Hospital Medicine Service -  History & Physical        CHIEF COMPLAINT   Shortness of breath/cough/upper respiratory tract infectious symptoms, not getting better   HISTORY OF PRESENT ILLNESS      Per ER documentation, 73-year-old female presents emergency department for evaluation of 5 days of cough, rhinorrhea, progressive shortness of breath and wheezing.  Patient was seen in the emergency department on 12-24 and diagnosed with bronchitis.  Started on Zithromax, prednisone and inhaler without significant improvement in her symptoms.  Patient states that her shortness of breath with exertion has worsened over the last 24 hours.  Patient denies any fever.  No nausea or vomiting.  There is no dizziness.  Patient denies any leg pain or swelling.  There is no abdominal pain.  On my evaluation, 73 years old female is accompanied by her sister at bedside, very pleasant, told me as above.  She also told me she was feeling fine but have a bout of cough repeatedly resulting in more wheezing which was not getting better so came to ER for evaluation.  Patient told me she does have a history of asthma, but her last inhaler use was almost more than 3 months ago.  Denies any chest pain jaw pain arm pain back pain, any cardio neuro concerns.  She did have upper respiratory tract infection symptoms recently and was in the ER for which she was given antibiotic-Zithromax and steroids.  Overall feels better.  No fevers/chills.  No pursed lip breathing no accessory muscle use and no intercostal retractions.  Denies any belly pain diarrhea hematemesis melena.  Denies any other concerns.  Patient was given steroids and nebs.  Stable pulse ox stable hemodynamically.  Chest x-ray is negative for acute pathology.  Chemistry essentially normal, with blood glucose 241, normal creatinine, normal troponin, .  Patient does have leukocytosis of 16.42 with no fever, likely related to steroids.  Normal hemoglobin and platelets.INR  2.4.    PAST MEDICAL AND SURGICAL HISTORY      Past Medical History:   Diagnosis Date   • Asthma    • Atrial fibrillation (CMS/HCC)    • COPD (chronic obstructive pulmonary disease) (CMS/HCC) (HCC)    • Diverticulitis     ruptured   • Type 2 diabetes mellitus (CMS/HCC) ([facility])        Past Surgical History:   Procedure Laterality Date   • CATARACT EXTRACTION, BILATERAL     • HERNIA REPAIR     • LAPAROSCOPIC COLON RESECTION         MEDICATIONS      Prior to Admission medications    Medication Sig Start Date End Date Taking? Authorizing Provider   warfarin (COUMADIN) 2.5 mg tablet Take 3.75 mg by mouth 5 (five) times a week.   Yes Chelle Tavera MD   albuterol HFA (VENTOLIN HFA) 90 mcg/actuation inhaler Inhale 2 puffs every 4 (four) hours as needed for wheezing. 12/24/18 1/23/19  DRISS Xiao MD   atorvastatin (LIPITOR) 10 mg tablet Take 10 mg by mouth once daily. 10/30/18   Chelle Tavera MD   azithromycin (ZITHROMAX) 250 mg tablet Take 1 tablet (250 mg total) by mouth daily. 1 tablet daily for 4 days. 12/25/18   DRISS Xiao MD   DIGOX 125 mcg tablet Take 125 mcg by mouth daily.   12/21/18   Chelle Tavera MD   JANUVIA 100 mg tablet Take 100 mg by mouth daily.   12/24/18   Chelle Tavera MD   metFORMIN (GLUCOPHAGE) 500 mg tablet Take 1,000 mg by mouth 2 (two) times a day with meals.   12/24/18   Chelle Tavera MD   metoprolol succinate XL (TOPROL-XL) 50 mg 24 hr tablet Take 50 mg by mouth daily.   12/7/18   Chelle Tavera MD   montelukast (SINGULAIR) 10 mg tablet Take 10 mg by mouth every evening.   10/25/18   Chelle Tavera MD   predniSONE (DELTASONE) 50 mg tablet Take 1 tablet (50 mg total) by mouth daily for 5 days. 12/24/18 12/29/18  DRISS Xiao MD   warfarin (COUMADIN) 2.5 mg tablet Take 2.5 mg by mouth 2 (two) times a week (Mon, Thu).   12/21/18   Chelle Tavera MD       ALLERGIES      Reglan [metoclopramide hcl]; Amoxicillin; and Bactrim  [sulfamethoxazole-trimethoprim]    FAMILY HISTORY      History reviewed. No pertinent family history.    SOCIAL HISTORY      Social History     Social History   • Marital status: Single     Spouse name: N/A   • Number of children: N/A   • Years of education: N/A     Social History Main Topics   • Smoking status: Never Smoker   • Smokeless tobacco: Never Used   • Alcohol use Yes      Comment: socially   • Drug use: No   • Sexual activity: Defer     Other Topics Concern   • None     Social History Narrative   • None       REVIEW OF SYSTEMS      All other systems reviewed and negative except as noted in HPI    PHYSICAL EXAMINATION      Temp:  [36.7 °C (98.1 °F)-37.4 °C (99.3 °F)] 36.9 °C (98.4 °F)  Heart Rate:  [80-98] 96  Resp:  [18-24] 18  BP: (149-156)/(66-95) 156/66  FiO2 (%) (Set):  [21 %] 21 %  There is no height or weight on file to calculate BMI.    Physical Exam   Constitutional: pt is oriented to person, place, and time. appears well-developed and well-nourished.     HENT:  Head: Normocephalic and atraumatic. Eyes: Conjunctivae and EOM are normal.     Neck:  Normal range of motion. Neck supple. No JVD present.     Cardiovascular: Normal rate, regular rhythm and normal heart sounds.      Pulmonary/Chest: Effort normal and breath sounds normal. No respiratory distress.  Occasional bilateral expiratory rhonchi and wheezes.  No rales.  No tachypnea.  Much improved clinically per patient.    Abdominal: Soft. Bowel sounds are normal. exhibits no distension. There is no tenderness. There is no rebound.     Musculoskeletal: Normal range of motion.     Neurological: pt is alert and oriented to person, place, and time. No cranial nerve deficit or sensory deficit.     Skin: Skin is warm and dry. No rash noted. and affect. pts behavior is normal.     Nursing note and vitals reviewed.  LABS / IMAGING / EKG        Labs  I have reviewed the patient's pertinent labs.  Significant abnormals are aa.    Imaging  I have  independently reviewed the pertinent imaging from the last 24 hrs.    ECG/Telemetry  I have independently reviewed the telemetry. No events for the last 24 hours.    ASSESSMENT AND PLAN         DM (diabetes mellitus) (CMS/[facility]) ([facility])  Continue home medication including Januvia and metformin  Sliding scale insulin  Fingersticks monitoring    Shortness of breath  Shortness of breath is likely related to recurrent bouts of cough/asthma exacerbation  No diagnosed COPD history in the past  Monitor on telemetry  Continuous pulse ox  Do nebulization as needed  Follow pulmonary recommendation  Hold off on antibiotic as there is no infectious symptoms clinically    Asthma  Continue to do nebulizations  Patient is not using inhalers for last couple months  Follow pulmonary recommendation and outpatient follow-ups    A-fib (CMS/[facility]) ([facility])  Stable heart rate  Continue digoxin/beta-blockers and Lipitor  Continue anticoagulation with Coumadin with goal INR 2-3           VTE Assessment: Padua VTE Score: 3  VTE Treatment Plan: coumadin  Code Status: Full Code  Estimated discharge date: 12/28/2018     Prosper Diamond MD  12/26/2018

## 2018-12-27 NOTE — CONSULTS
Pulmonology Consult Note    Reason For Consult: Asthma exacerbation/COPD    HPI: 73-year-old female presents to the ED with complaints of shortness of breath for approximately 5 days.  On 12/22/18 patient woke up with persistent rhinorrhea of thick tan/yellow mucus.  Upon waking on 12/23/18 she developed a cough expectorating tan/yellow sputum.  Throughout the day she started to experience wheeze and orthopnea when trying to sleep so she decided to come to the ED on 12/24/18 for evaluation.  She was diagnosed with acute bronchitis treated with azithromycin, Solu-Medrol and an hour-long neb in the ED.  At that time her oxygen saturations are stable at 90-93% on room air.  She was discharged home on a Z-Enrrique, prednisone 50 mg daily x5 days and Ventolin MDI as needed.  Yesterday, 12/26/18, she woke up feeling dry.  She noticed an improved wheeze which she took albuterol for.  The albuterol did bring her relief but caused increased cough leading to respiratory distress and trouble expectorating sputum.  Her sister then drove her to the ED for evaluation.    She denies fevers or chills.  Oxygen saturations have been well maintained on room air.  Influenza/RSV PCR negative, received influenza vaccine this season.  Blood cultures pending.  Negative troponin x1.  .  Leukocytosis 16.52, was 12.45 on 12/24/18.  INR 2.4, takes warfarin for atrial fibrillation.  Chest x-ray with mild interstitial prominence at the bases, no acute disease when compared to 12/24/18.    History of asthma, sensitivities to formaldehyde, perfumes and hairsprays.  She has not seen a pulmonologist over the past 4-5 years.  She has never been admitted to the hospital for shortness of breath or asthma.  She does not wear home oxygen.  She has not required bronchodilator therapy in the past 4-5 years, was on Advair in the past.  She takes Singulair 10 mg nightly.  Denies a history of tobacco use but lived with her father who smoked for  approximately 20 years.    Upon examination sats on room air.  In no obvious respiratory distress.  Loose but nonproductive cough is noted at the bedside.      Past Medical History:  Past Medical History:   Diagnosis Date   • Asthma    • Atrial fibrillation (CMS/HCC)    • COPD (chronic obstructive pulmonary disease) (CMS/HCC) ([facility])    • Diverticulitis     ruptured   • Type 2 diabetes mellitus (CMS/HCC) ([facility])        Past Surgical History:  Past Surgical History:   Procedure Laterality Date   • CATARACT EXTRACTION, BILATERAL     • HERNIA REPAIR     • LAPAROSCOPIC COLON RESECTION         Allergies:   Reglan [metoclopramide hcl]; Amoxicillin; and Bactrim [sulfamethoxazole-trimethoprim]    Medications:  Current Facility-Administered Medications   Medication Dose Route Frequency Provider Last Rate Last Dose   • atorvastatin (LIPITOR) tablet 10 mg  10 mg oral Daily (6a) Prosper Diamond MD       • glucose chewable tablet 16-32 g of dextrose  16-32 g of dextrose oral PRN Prosper Diamond MD        Or   • dextrose 40 % oral gel 15-30 g of dextrose  15-30 g of dextrose oral PRN Prsoper Diamond MD        Or   • glucagon (GLUCAGEN) injection 1 mg  1 mg intramuscular PRN Prosper Diamond MD        Or   • dextrose in water injection 12.5 g  25 mL intravenous PRN Prosper Diamond MD       • digoxin (LANOXIN) tablet 125 mcg  125 mcg oral Daily Prosper Diamond MD   125 mcg at 12/26/18 2335   • insulin aspart U-100 (NovoLOG) pen 0-12 Units  0-12 Units subcutaneous With meals & nightly Shelley Wilcox DO   8 Units at 12/27/18 0823   • ipratropium (ATROVENT) 0.02 % nebulizer solution 0.5 mg  0.5 mg nebulization Continuous Kenny Smith, DO   0.5 mg at 12/26/18 1300   • ipratropium-albuterol (DUO-NEB) 0.5-2.5 mg/3 mL nebulizer solution 3 mL  3 mL nebulization q6h [facility] Prosper Diamond MD   3 mL at 12/27/18 0632   • metFORMIN (GLUCOPHAGE) tablet 1,000 mg  1,000 mg oral BID with meals Prosper Diamond MD   1,000 mg at 12/27/18 0822   • methylPREDNISolone  sod suc(PF) (Solu-MEDROL) injection 40 mg  40 mg intravenous q8h MAGAN Prosper Diamond MD   40 mg at 12/27/18 0632   • metoprolol succinate XL (TOPROL-XL) 24 hr ER tablet 50 mg  50 mg oral Daily Prosper Diamond MD   50 mg at 12/26/18 2335   • montelukast (SINGULAIR) tablet 10 mg  10 mg oral q PM Prosper Diamond MD   10 mg at 12/26/18 2335   • ondansetron (ZOFRAN) injection 4 mg  4 mg intravenous q8h PRN Prosper Diamond MD       • SITagliptin (JANUVIA) tablet 100 mg  100 mg oral Daily Prosper Diamond MD   100 mg at 12/27/18 0822   • warfarin (COUMADIN) tablet 2.5 mg  2.5 mg oral Once per day on Mon Thu Prosper Diamond MD       • warfarin (COUMADIN) tablet 3.75 mg  3.75 mg oral Once per day on Sun Tue Wed Fri Sat Prosper Diamond MD   3.75 mg at 12/26/18 2338       Social History:  Social History     Social History   • Marital status: Single     Spouse name: N/A   • Number of children: N/A   • Years of education: N/A     Social History Main Topics   • Smoking status: Never Smoker   • Smokeless tobacco: Never Used   • Alcohol use Yes      Comment: socially   • Drug use: No   • Sexual activity: Defer     Other Topics Concern   • None     Social History Narrative   • None       Family History:  History reviewed. No pertinent family history.    Review of Systems:  Review of Systems   Constitutional: Negative for activity change, appetite change, chills, diaphoresis, fatigue, fever and unexpected weight change.   HENT: Positive for rhinorrhea. Negative for congestion, ear pain, nosebleeds, postnasal drip, sinus pain, sinus pressure, sore throat and trouble swallowing.    Respiratory: Positive for cough, shortness of breath and wheezing. Negative for chest tightness.    Cardiovascular: Negative for chest pain, palpitations and leg swelling.   Gastrointestinal: Negative for abdominal distention, abdominal pain, constipation, diarrhea, nausea and vomiting.   Musculoskeletal: Negative for arthralgias, back pain and myalgias.  "  Allergic/Immunologic: Positive for environmental allergies. Negative for immunocompromised state.   Neurological: Negative for dizziness, weakness, light-headedness and headaches.       Objective     Vital Signs for the last 24 hours:  /70 (BP Location: Right upper arm, Patient Position: Lying)   Pulse 68   Temp 36.6 °C (97.9 °F) (Oral)   Resp 18   Ht 1.778 m (5' 10\")   Wt 77.3 kg (170 lb 8 oz)   SpO2 96%   BMI 24.46 kg/m²   PF Readings from Last 3 Encounters:   No data found for PF       Oxygen:  Oxygen Therapy: None (Room air)     FiO2 (%) (Set): 21 %        Physical Exam:  Physical Exam   Constitutional: She is oriented to person, place, and time. She appears well-developed and well-nourished. No distress.   HENT:   Head: Normocephalic and atraumatic.   Eyes: No scleral icterus.   Neck: Neck supple. No tracheal deviation present.   Cardiovascular:   Irregularly irregular, rate controlled   Pulmonary/Chest: Effort normal. No respiratory distress.   No obvious wheezes/rales/rhonchi appreciated, loose cough and bronchospasm with deep breaths   Abdominal: Soft. There is no tenderness.   Musculoskeletal: She exhibits no edema.   Neurological: She is alert and oriented to person, place, and time.   Skin: Skin is warm and dry.   Psychiatric: She has a normal mood and affect.   Nursing note and vitals reviewed.      Labs:        Lab Results   Component Value Date    WBC 9.37 12/27/2018    HGB 12.9 12/27/2018    HCT 40.7 12/27/2018     12/27/2018    ALT 30 12/27/2018    AST 39 12/27/2018     12/27/2018    K 5.3 (H) 12/27/2018     12/27/2018    CREATININE 0.5 (L) 12/27/2018    BUN 23 (H) 12/27/2018    CO2 30 12/27/2018    INR 2.4 12/26/2018       Input/Ouput in Last 24 hours:  No intake or output data in the 24 hours ending 12/27/18 0951    Imaging/Radiology:    Chest x-ray 2 view 12/26/18    CLINICAL HISTORY: Cough  COMPARISON: 12/24/2018  COMMENT: Two views of the chest again demonstrate " mild interstitial prominence  at the bases.  There is no effusion.  There is no consolidative process seen and  no pneumothorax.  The heart size is normal.  The hilar and mediastinal contours  are normal.  There is no acute osseous abnormality.  --  IMPRESSION: No acute disease in the chest.        IMPRESSION AND PLAN:    Acute asthma exacerbation  -seen in ED 12/24/18, d/c'd home on Zpak, Prednisone 50 mg daily and Albuterol MDI prn  -Influenza/RSV PCR negative  -Blood cultures pending  -Leukocytosis resolved  -Encourage incentive spirometer and flutter valve use  -Duo nebs every 6 hours, will change to Atrovent with Afib  --resume albuterol MDI ptn upon d/c  --may benefit from ICS upon d/c  -IV Solu-Medrol 40 mg every 8 hours  -Singulair 10 mg nightly, resume upon discharge  -Mucinex 600 mg BID, Sputum cx  -follow up w/ pulm upon d/c    Atrial fibrillation, rate controlled  -follow INR, on warfarin    Regular textured diet  Full Code

## 2018-12-27 NOTE — ASSESSMENT & PLAN NOTE
Continue home medication including Januvia and metformin  Hyperglycemic this am    Seen by nutrition  Check A1C  Likely elevated 2/2 steroids  continue outpt meds  Discussed ADA diet and importance of outpt follow-up.  She is agreeable

## 2018-12-28 VITALS
HEIGHT: 70 IN | DIASTOLIC BLOOD PRESSURE: 76 MMHG | RESPIRATION RATE: 18 BRPM | WEIGHT: 170 LBS | TEMPERATURE: 98.3 F | SYSTOLIC BLOOD PRESSURE: 153 MMHG | BODY MASS INDEX: 24.34 KG/M2 | OXYGEN SATURATION: 95 % | HEART RATE: 71 BPM

## 2018-12-28 LAB
ANION GAP SERPL CALC-SCNC: 7 MEQ/L (ref 3–15)
BUN SERPL-MCNC: 29 MG/DL (ref 8–20)
CALCIUM SERPL-MCNC: 8.7 MG/DL (ref 8.9–10.3)
CHLORIDE SERPL-SCNC: 103 MEQ/L (ref 98–109)
CO2 SERPL-SCNC: 28 MEQ/L (ref 22–32)
CREAT SERPL-MCNC: 0.6 MG/DL
GFR SERPL CREATININE-BSD FRML MDRD: >60 ML/MIN/1.73M*2
GLUCOSE BLD-MCNC: 205 MG/DL (ref 70–99)
GLUCOSE BLD-MCNC: 255 MG/DL (ref 70–99)
GLUCOSE SERPL-MCNC: 219 MG/DL (ref 70–99)
INR PPP: 2.8 INR
POCT TEST: ABNORMAL
POCT TEST: ABNORMAL
POTASSIUM SERPL-SCNC: 4.3 MEQ/L (ref 3.6–5.1)
PROTHROMBIN TIME: 27.7 SEC (ref 12.2–14.5)
SODIUM SERPL-SCNC: 138 MEQ/L (ref 136–144)

## 2018-12-28 PROCEDURE — 25000000 HC PHARMACY GENERAL: Performed by: PHYSICIAN ASSISTANT

## 2018-12-28 PROCEDURE — 36415 COLL VENOUS BLD VENIPUNCTURE: CPT | Performed by: HOSPITALIST

## 2018-12-28 PROCEDURE — 63700000 HC SELF-ADMINISTRABLE DRUG: Performed by: HOSPITALIST

## 2018-12-28 PROCEDURE — 85610 PROTHROMBIN TIME: CPT | Performed by: HOSPITALIST

## 2018-12-28 PROCEDURE — 80048 BASIC METABOLIC PNL TOTAL CA: CPT | Performed by: HOSPITALIST

## 2018-12-28 PROCEDURE — 99239 HOSP IP/OBS DSCHRG MGMT >30: CPT | Performed by: HOSPITALIST

## 2018-12-28 PROCEDURE — 63700000 HC SELF-ADMINISTRABLE DRUG: Performed by: PHYSICIAN ASSISTANT

## 2018-12-28 PROCEDURE — 63600000 HC DRUGS/DETAIL CODE: Performed by: HOSPITALIST

## 2018-12-28 PROCEDURE — 94761 N-INVAS EAR/PLS OXIMETRY MLT: CPT

## 2018-12-28 RX ORDER — FLUTICASONE FUROATE AND VILANTEROL 100; 25 UG/1; UG/1
1 POWDER RESPIRATORY (INHALATION) DAILY
Qty: 28 EACH | Refills: 5 | Status: SHIPPED | OUTPATIENT
Start: 2018-12-28 | End: 2019-01-27

## 2018-12-28 RX ORDER — IPRATROPIUM BROMIDE 0.5 MG/2.5ML
0.5 SOLUTION RESPIRATORY (INHALATION) EVERY 6 HOURS PRN
Qty: 300 ML | Refills: 0 | Status: SHIPPED | OUTPATIENT
Start: 2018-12-28 | End: 2018-12-28

## 2018-12-28 RX ORDER — PREDNISONE 10 MG/1
TABLET ORAL
Qty: 42 TABLET | Refills: 0 | OUTPATIENT
Start: 2018-12-28 | End: 2025-01-14 | Stop reason: HOSPADM

## 2018-12-28 RX ORDER — IPRATROPIUM BROMIDE 0.5 MG/2.5ML
0.5 SOLUTION RESPIRATORY (INHALATION) EVERY 6 HOURS PRN
Qty: 300 ML | Refills: 0 | Status: SHIPPED | OUTPATIENT
Start: 2018-12-28 | End: 2019-01-27

## 2018-12-28 RX ORDER — ALBUTEROL SULFATE 90 UG/1
2 INHALANT RESPIRATORY (INHALATION) EVERY 4 HOURS PRN
Qty: 1 INHALER | Refills: 0 | Status: SHIPPED | OUTPATIENT
Start: 2018-12-28 | End: 2019-01-27

## 2018-12-28 RX ORDER — GUAIFENESIN 600 MG/1
600 TABLET, EXTENDED RELEASE ORAL 2 TIMES DAILY
Qty: 177 TABLET | Refills: 0
Start: 2018-12-28 | End: 2019-03-27

## 2018-12-28 RX ADMIN — METFORMIN HYDROCHLORIDE 1000 MG: 500 TABLET ORAL at 07:53

## 2018-12-28 RX ADMIN — SITAGLIPTIN 100 MG: 25 TABLET, FILM COATED ORAL at 08:37

## 2018-12-28 RX ADMIN — INSULIN ASPART 4 UNITS: 100 INJECTION, SOLUTION INTRAVENOUS; SUBCUTANEOUS at 07:50

## 2018-12-28 RX ADMIN — METHYLPREDNISOLONE SODIUM SUCCINATE 40 MG: 40 INJECTION, POWDER, FOR SOLUTION INTRAMUSCULAR; INTRAVENOUS at 08:36

## 2018-12-28 RX ADMIN — METOPROLOL SUCCINATE 50 MG: 50 TABLET, EXTENDED RELEASE ORAL at 08:37

## 2018-12-28 RX ADMIN — DIGOXIN 125 MCG: 125 TABLET ORAL at 08:36

## 2018-12-28 RX ADMIN — IPRATROPIUM BROMIDE 0.5 MG: 0.5 SOLUTION RESPIRATORY (INHALATION) at 05:19

## 2018-12-28 RX ADMIN — GUAIFENESIN 600 MG: 600 TABLET, EXTENDED RELEASE ORAL at 08:37

## 2018-12-28 RX ADMIN — IPRATROPIUM BROMIDE 0.5 MG: 0.5 SOLUTION RESPIRATORY (INHALATION) at 11:43

## 2018-12-28 RX ADMIN — INSULIN ASPART 8 UNITS: 100 INJECTION, SOLUTION INTRAVENOUS; SUBCUTANEOUS at 11:40

## 2018-12-28 NOTE — PROGRESS NOTES
Late Entry: attempted to meet with pt for CC assessment; pt was out for testing. Will attempt tomorrow, 12/28.

## 2018-12-28 NOTE — DISCHARGE SUMMARY
Hospital Medicine Service -  Inpatient Discharge Summary        BRIEF OVERVIEW   Admitting Provider: Prosper Diamond MD  Attending Provider: Prosper Diamond MD Attending phys phone: [phone]    PCP: Yasmani Guerrero [phone]    Admission Date: 12/26/2018  Discharge Date: 12/28/2018     DISCHARGE DIAGNOSES      Primary Discharge Diagnosis  Asthma with acute exacerbation    Secondary Discharge Diagnoses  Active Hospital Problems    Diagnosis Date Noted   • Asthma with acute exacerbation 12/26/2018     Priority: High   • Hyperkalemia 12/27/2018     Priority: Medium   • A-fib (CMS/[facility]) ([facility]) 12/26/2018   • DM (diabetes mellitus) (CMS/[facility]) ([facility]) 12/26/2018      Resolved Hospital Problems    Diagnosis Date Noted Date Resolved   No resolved problems to display.       Problem List on Day of Discharge               * Asthma with acute exacerbation   Assessment & Plan    Continue atrovent  Dc on inhalers  Pulm input appreciated  Wean steroids-- po taper  Will give Rx for atrovent prn and neb and inhalers  O2, wean as able.  Amb pulse ox wnl  No abx  Monitor symptoms        Hyperkalemia   Assessment & Plan    K 5.3, not mentioned if hemolyzed    K 4.3 repeat        DM (diabetes mellitus) (CMS/[facility]) ([facility])   Assessment & Plan    Continue home medication including Januvia and metformin  Hyperglycemic this am    Seen by nutrition  Check A1C  Likely elevated 2/2 steroids  continue outpt meds  Discussed ADA diet and importance of outpt follow-up.  She is agreeable        A-fib (CMS/[facility]) ([facility])   Assessment & Plan    Stable heart rate  Continue digoxin/beta-blockers and Lipitor  Continue anticoagulation with Coumadin with goal INR 2-3            SUMMARY OF HOSPITALIZATION      Presenting Problem/History of Present Illness  Shortness of breath    This is a 73 y.o. year-old female admitted on 12/26/2018 with Shortness of breath [R06.02]  Acute bronchitis with bronchospasm [J20.9]  COPD exacerbation (CMS/[facility]) ([facility])  [J44.1].      Hospital Course    The patient is a 74 yo female who was admitted for asthma exacerbation.  She was placed on atrovent and IV steroids.  She did not need oxygen during her stay.  The patient had no signs of acute infectious etiology.    No events overnight.  Pt s+e, lying in bed.  Denies chest pain or dyspnea.  Denies fever or chills.  All questions answered. All other ROS-.  Case d/w RN.  Time spent on dc was 35 minutes.    Exam on Day of Discharge  Physical Exam   Constitutional: She is oriented to person, place, and time. She appears well-developed and well-nourished. She is cooperative. No distress.   HENT:   Head: Normocephalic and atraumatic.   Eyes: Conjunctivae are normal. No scleral icterus.   Neck: Neck supple.   Cardiovascular: Normal rate.  An irregularly irregular rhythm present.   Pulmonary/Chest: Effort normal. She has decreased breath sounds.   Abdominal: Soft. Bowel sounds are normal. She exhibits no distension. There is no tenderness.   Musculoskeletal: Normal range of motion.   Neurological: She is alert and oriented to person, place, and time.   Skin: Skin is warm and dry.   Psychiatric: She has a normal mood and affect. Her behavior is normal. Judgment and thought content normal.   Nursing note and vitals reviewed.      Consults During Admission  IP CONSULT TO PULMONOLOGY/SLEEP MEDICINE  IP CONSULT TO NUTRITION SERVICES  IP CONSULT TO DIABETES EDUCATION    DISCHARGE MEDICATIONS        Medication List      START taking these medications    fluticasone-vilanterol 100-25 mcg/dose powder for inhalation  Commonly known as:  BREO ELLIPTA  Inhale 1 puff daily. Rinse mouth with water after use to reduce aftertaste and incidence of candidiasis. Do not swallow.     guaiFENesin 600 mg 12 hr tablet  Commonly known as:  MUCINEX  Take 1 tablet (600 mg total) by mouth 2 (two) times a day for 177 doses.     ipratropium 0.02 % nebulizer solution  Commonly known as:  ATROVENT  Take 2.5 mL (0.5 mg  total) by nebulization every 6 (six) hours as needed for wheezing or shortness of breath.        CHANGE how you take these medications    predniSONE 10 mg tablet  Commonly known as:  DELTASONE  Take 6 tabs (60mg) daily for 2 days, then take 5 tabs (50mg) daily for 2 days. Continue to decrease by 1 tab (10mg) every 2 days until gone.  What changed:  · medication strength  · how much to take  · how to take this  · when to take this  · additional instructions        CONTINUE taking these medications    albuterol HFA 90 mcg/actuation inhaler  Commonly known as:  VENTOLIN HFA  Inhale 2 puffs every 4 (four) hours as needed for wheezing.     atorvastatin 10 mg tablet  Commonly known as:  LIPITOR  Take 10 mg by mouth once daily.     DIGOX 125 mcg tablet  Generic drug:  digoxin  Take 125 mcg by mouth daily.     JANUVIA 100 mg tablet  Generic drug:  SITagliptin  Take 100 mg by mouth daily.     metFORMIN 500 mg tablet  Commonly known as:  GLUCOPHAGE  Take 1,000 mg by mouth 2 (two) times a day with meals.     metoprolol succinate XL 50 mg 24 hr tablet  Commonly known as:  TOPROL-XL  Take 50 mg by mouth daily.     montelukast 10 mg tablet  Commonly known as:  SINGULAIR  Take 10 mg by mouth every evening.     * warfarin 2.5 mg tablet  Commonly known as:  COUMADIN  Take 2.5 mg by mouth 2 (two) times a week (Mon, Thu).     * warfarin 2.5 mg tablet  Commonly known as:  COUMADIN  Take 3.75 mg by mouth 5 (five) times a week.        * This list has 2 medication(s) that are the same as other medications prescribed for you. Read the directions carefully, and ask your doctor or other care provider to review them with you.            STOP taking these medications    azithromycin 250 mg tablet  Commonly known as:  ZITHROMAX            Instructions for after discharge     Diet       Diet Type:   Comment - Diabetic    Follow-up with Provider:       Instructions for follow-up:  in 1-2 weeks    Follow-up with primary physician (PCP)        Post-Discharge Activity: Normal activity as tolerated.       Normal activity as tolerated.             PROCEDURES / LABS / IMAGING        Pertinent Imaging  X-ray Chest 2 Views    Result Date: 12/26/2018  IMPRESSION: No acute disease in the chest.    X-ray Chest 1 View    Result Date: 12/24/2018  IMPRESSION: Mild bibasilar interstitial prominence and suspected small bilateral pleural effusions.      OUTPATIENT  FOLLOW-UP / REFERRALS / PENDING TESTS        Outpatient Follow-Up Appointments  Encounter Information     You do not currently have any appointments scheduled.          Referrals  No orders of the defined types were placed in this encounter.      Test Results Pending at Discharge  Unresulted Labs     Start     Ordered    12/27/18 1007  Sputum culture / smear  Once      12/27/18 1007    12/27/18 1007  Sputum Gram Stain (Lab Only)  PROCEDURE ONCE      12/27/18 1007    12/26/18 1242  Blood culture  (ED LAB BLOOD CULTURE X2)  STAT      12/26/18 1241    12/26/18 1242  Blood culture  (ED LAB BLOOD CULTURE X2)  STAT     Comments:  Draw from a second site different than first site.      12/26/18 1241          Important Issues to Address in Follow-Up  Follow-up with Pulmonary  Monitor blood sugar    DISCHARGE DISPOSITION      Disposition: Home     Code Status At Discharge: Full Code    Physician Order for Life-Sustaining Treatment Document Status      No documents found

## 2018-12-28 NOTE — PROGRESS NOTES
[facility] - Received referral from CC. Reviewed chart and met with pt who states she will be driving to [place] on Sunday and understands that she doesn't qualify for Home Care services. She confirmed it is not a taxing effort to leave the house. Notified CC. Thanks

## 2018-12-28 NOTE — DISCHARGE INSTR - OTHER ORDERS
[facility]  [address] [place] [address] [phone]  Please stop on your way home and  nebulizer.

## 2018-12-28 NOTE — NURSING NOTE
Home Oxygen Qualification Protocol    Findings:  Room Air Spo2 at rest: 95 %  Room Air SpO2 % with ambulation: 93 %       Conclusion:  Meets criteria for home oxygen: No       Oxygen Recommendation:             The information above was collected from the most recent home oxygen qualification protocol performed on the patient.

## 2018-12-28 NOTE — PLAN OF CARE
Problem: Patient Care Overview  Goal: Discharge Needs Assessment  Met with: pt    Lives in/with: alone in a 2 story home    PLOF: independent    DME: cane - uses prn; glucometer, shower stool, safety bar in shower    Home Care/SNF: [facility] the past; SNF stay in [place] approx 10 years ago    DC Plan: home; discussed home care. Pt would like nurse to come. Reviewed previously used agencies, pt did not want. She asked for the home care  Assoc with the hospital. Confirmed she meant [facility]. Referral placed in [facility] and liaison notified. Pt has her sister coming to give ride home on DC.    CM asked to get nebulizer for pt. Discussed with pt. CM offered to order neb and have it delivered. Pt stated she would like to  on her way home herself. Script given to pt.    CM will cont to follow and assist with DC planning     12/28/18 1313   DC Needs Assessment   Concerns To Be Addressed care coordination/care conferences   Readmission Within The Last 30 Days no previous admission in last 30 days   Anticipated Discharge Disposition home with home health services   Type of Home Care Services nursing   Equipment Needed After Discharge nebulizer   Current Health   Anticipated Changes Related to Illness none   Activity/Self Care ROS   Equipment Currently Used at Home cane, straight;glucometer;shower chair

## 2018-12-28 NOTE — DISCHARGE INSTRUCTIONS
Please use your inhaler and use inhaler/nebulizer as needed.  Continue steroid taper.  Follow-up with Pulmonary in the office.    Monitor your blood sugar closely and follow a diabetic diet.      Your INR was 2.8, please have it rechecked Monday.    Asthma, Adult  Asthma is a recurring condition in which the airways tighten and narrow. Asthma can make it difficult to breathe. It can cause coughing, wheezing, and shortness of breath. Asthma episodes, also called asthma attacks, range from minor to life-threatening. Asthma cannot be cured, but medicines and lifestyle changes can help control it.  What are the causes?  Asthma is believed to be caused by inherited (genetic) and environmental factors, but its exact cause is unknown. Asthma may be triggered by allergens, lung infections, or irritants in the air. Asthma triggers are different for each person. Common triggers include:  · Animal dander.  · Dust mites.  · Cockroaches.  · Pollen from trees or grass.  · Mold.  · Smoke.  · Air pollutants such as dust, household , hair sprays, aerosol sprays, paint fumes, strong chemicals, or strong odors.  · Cold air, weather changes, and winds (which increase molds and pollens in the air).  · Strong emotional expressions such as crying or laughing hard.  · Stress.  · Certain medicines (such as aspirin) or types of drugs (such as beta-blockers).  · Sulfites in foods and drinks. Foods and drinks that may contain sulfites include dried fruit, potato chips, and sparkling grape juice.  · Infections or inflammatory conditions such as the flu, a cold, or an inflammation of the nasal membranes (rhinitis).  · Gastroesophageal reflux disease (GERD).  · Exercise or strenuous activity.  What are the signs or symptoms?  Symptoms may occur immediately after asthma is triggered or many hours later. Symptoms include:  · Wheezing.  · Excessive nighttime or early morning coughing.  · Frequent or severe coughing with a common  cold.  · Chest tightness.  · Shortness of breath.  How is this diagnosed?  The diagnosis of asthma is made by a review of your medical history and a physical exam. Tests may also be performed. These may include:  · Lung function studies. These tests show how much air you breathe in and out.  · Allergy tests.  · Imaging tests such as X-rays.  How is this treated?  Asthma cannot be cured, but it can usually be controlled. Treatment involves identifying and avoiding your asthma triggers. It also involves medicines. There are 2 classes of medicine used for asthma treatment:  · Controller medicines. These prevent asthma symptoms from occurring. They are usually taken every day.  · Reliever or rescue medicines. These quickly relieve asthma symptoms. They are used as needed and provide short-term relief.  Your health care provider will help you create an asthma action plan. An asthma action plan is a written plan for managing and treating your asthma attacks. It includes a list of your asthma triggers and how they may be avoided. It also includes information on when medicines should be taken and when their dosage should be changed. An action plan may also involve the use of a device called a peak flow meter. A peak flow meter measures how well the lungs are working. It helps you monitor your condition.  Follow these instructions at home:  · Take medicines only as directed by your health care provider. Speak with your health care provider if you have questions about how or when to take the medicines.  · Use a peak flow meter as directed by your health care provider. Record and keep track of readings.  · Understand and use the action plan to help minimize or stop an asthma attack without needing to seek medical care.  · Control your home environment in the following ways to help prevent asthma attacks:  ¨ Do not smoke. Avoid being exposed to secondhand smoke.  ¨ Change your heating and air conditioning filter  regularly.  ¨ Limit your use of fireplaces and wood stoves.  ¨ Get rid of pests (such as roaches and mice) and their droppings.  ¨ Throw away plants if you see mold on them.  ¨ Clean your floors and dust regularly. Use unscented cleaning products.  ¨ Try to have someone else vacuum for you regularly. Stay out of rooms while they are being vacuumed and for a short while afterward. If you vacuum, use a dust mask from a hardware store, a double-layered or microfilter vacuum  bag, or a vacuum  with a HEPA filter.  ¨ Replace carpet with wood, tile, or vinyl finn. Carpet can trap dander and dust.  ¨ Use allergy-proof pillows, mattress covers, and box spring covers.  ¨ Wash bed sheets and blankets every week in hot water and dry them in a dryer.  ¨ Use blankets that are made of polyester or cotton.  ¨ Clean bathrooms and daniel with bleach. If possible, have someone repaint the walls in these rooms with mold-resistant paint. Keep out of the rooms that are being cleaned and painted.  ¨ Wash hands frequently.  Contact a health care provider if:  · You have wheezing, shortness of breath, or a cough even if taking medicine to prevent attacks.  · The colored mucus you cough up (sputum) is thicker than usual.  · Your sputum changes from clear or white to yellow, green, gray, or bloody.  · You have any problems that may be related to the medicines you are taking (such as a rash, itching, swelling, or trouble breathing).  · You are using a reliever medicine more than 2-3 times per week.  · Your peak flow is still at 50-79% of your personal best after following your action plan for 1 hour.  · You have a fever.  Get help right away if:  · You seem to be getting worse and are unresponsive to treatment during an asthma attack.  · You are short of breath even at rest.  · You get short of breath when doing very little physical activity.  · You have difficulty eating, drinking, or talking due to asthma  symptoms.  · You develop chest pain.  · You develop a fast heartbeat.  · You have a bluish color to your lips or fingernails.  · You are light-headed, dizzy, or faint.  · Your peak flow is less than 50% of your personal best.  This information is not intended to replace advice given to you by your health care provider. Make sure you discuss any questions you have with your health care provider.  Document Released: 12/18/2006 Document Revised: 05/31/2017 Document Reviewed: 07/17/2014  Elsevier Interactive Patient Education © 2017 [employer]      Please follow up with your Lung and Sleep Physician:    Website: [email or web address]    [facility]  [address], [address]- Outpatient [facility], [place], [place] [address]  Tel: [phone]  Fax: [phone]

## 2018-12-28 NOTE — PLAN OF CARE
Problem: Patient Care Overview  Goal: Plan of Care Review  Outcome: Ongoing (interventions implemented as appropriate)   12/28/18 0051   Coping/Psychosocial   Plan Of Care Reviewed With patient   Plan of Care Review   Progress progress toward functional goals as expected   Outcome Summary patient denies pain / SOB, medications given without difficulty, fall prevention measures in place, skin integrity promoted, patient sleeping in bed at this time     Goal: Individualization & Mutuality  Outcome: Ongoing (interventions implemented as appropriate)   12/28/18 0051   Individualization   Patient Specific Preferences patient likes to sit in bedside chair and read   Patient Specific Goals patient will not have any shortness of breath   Patient Specific Interventions medications administered       Problem: Breathing Pattern Ineffective (Adult)  Goal: Identify Related Risk Factors and Signs and Symptoms  Outcome: Ongoing (interventions implemented as appropriate)   12/28/18 0051   Breathing Pattern Ineffective   Related Risk Factors (Breathing Pattern Ineffective) advanced age;underlying condition   Signs and Symptoms (Breathing Pattern Ineffective) breath sounds abnormal     Goal: Effective Oxygenation/Ventilation  Outcome: Ongoing (interventions implemented as appropriate)   12/28/18 0051   Breathing Pattern Ineffective (Adult)   Effective Oxygenation/Ventilation making progress toward outcome     Goal: Anxiety/Fear Reduction  Outcome: Ongoing (interventions implemented as appropriate)   12/28/18 0051   Breathing Pattern Ineffective (Adult)   Anxiety/Fear Reduction making progress toward outcome       Problem: Fall Risk (Adult)  Goal: Identify Related Risk Factors and Signs and Symptoms  Outcome: Ongoing (interventions implemented as appropriate)   12/28/18 0051   Fall Risk   Related Risk Factors (Fall Risk) fatigue/slow reaction;environment unfamiliar   Signs and Symptoms (Fall Risk) presence of risk factors     Goal:  Absence of Falls  Outcome: Ongoing (interventions implemented as appropriate)   12/28/18 0051   Fall Risk (Adult)   Absence of Falls making progress toward outcome

## 2018-12-29 LAB
BACTERIA BLD CULT: NORMAL
BACTERIA BLD CULT: NORMAL

## 2018-12-31 LAB
BACTERIA BLD CULT: NORMAL
BACTERIA BLD CULT: NORMAL

## 2021-01-25 DIAGNOSIS — Z23 ENCOUNTER FOR IMMUNIZATION: ICD-10-CM

## 2021-01-30 ENCOUNTER — IMMUNIZATIONS (OUTPATIENT)
Dept: FAMILY MEDICINE CLINIC | Facility: HOSPITAL | Age: 76
End: 2021-01-30

## 2021-01-30 DIAGNOSIS — Z23 ENCOUNTER FOR IMMUNIZATION: Primary | ICD-10-CM

## 2021-01-30 PROCEDURE — 0011A SARS-COV-2 / COVID-19 MRNA VACCINE (MODERNA) 100 MCG: CPT

## 2021-01-30 PROCEDURE — 91301 SARS-COV-2 / COVID-19 MRNA VACCINE (MODERNA) 100 MCG: CPT

## 2021-02-25 ENCOUNTER — IMMUNIZATIONS (OUTPATIENT)
Dept: FAMILY MEDICINE CLINIC | Facility: HOSPITAL | Age: 76
End: 2021-02-25

## 2021-02-25 DIAGNOSIS — Z23 ENCOUNTER FOR IMMUNIZATION: Primary | ICD-10-CM

## 2021-02-25 PROCEDURE — 0012A SARS-COV-2 / COVID-19 MRNA VACCINE (MODERNA) 100 MCG: CPT

## 2021-02-25 PROCEDURE — 91301 SARS-COV-2 / COVID-19 MRNA VACCINE (MODERNA) 100 MCG: CPT

## 2025-01-04 ENCOUNTER — APPOINTMENT (EMERGENCY)
Dept: RADIOLOGY | Facility: HOSPITAL | Age: 80
DRG: 202 | End: 2025-01-04
Payer: MEDICARE

## 2025-01-04 ENCOUNTER — HOSPITAL ENCOUNTER (INPATIENT)
Facility: HOSPITAL | Age: 80
LOS: 10 days | Discharge: NURSING, REHAB OR LONG-TERM CARE | DRG: 202 | End: 2025-01-14
Attending: EMERGENCY MEDICINE | Admitting: INTERNAL MEDICINE
Payer: MEDICARE

## 2025-01-04 DIAGNOSIS — J20.5 RSV BRONCHITIS: Primary | ICD-10-CM

## 2025-01-04 DIAGNOSIS — I24.9 ACS (ACUTE CORONARY SYNDROME) (CMS/HCC): ICD-10-CM

## 2025-01-04 DIAGNOSIS — I21.4 NSTEMI (NON-ST ELEVATED MYOCARDIAL INFARCTION) (CMS/HCC): ICD-10-CM

## 2025-01-04 DIAGNOSIS — J44.1 COPD EXACERBATION (CMS/HCC): ICD-10-CM

## 2025-01-04 DIAGNOSIS — R79.89 ELEVATED TROPONIN: ICD-10-CM

## 2025-01-04 PROBLEM — I25.10 CAD (CORONARY ARTERY DISEASE): Status: ACTIVE | Noted: 2025-01-04

## 2025-01-04 PROBLEM — E78.5 DYSLIPIDEMIA: Status: ACTIVE | Noted: 2025-01-04

## 2025-01-04 LAB
ALBUMIN SERPL-MCNC: 4.3 G/DL (ref 3.5–5.7)
ALP SERPL-CCNC: 89 IU/L (ref 34–125)
ALT SERPL-CCNC: 11 IU/L (ref 7–52)
ANION GAP SERPL CALC-SCNC: 16 MEQ/L (ref 3–15)
APTT PPP: 104 SEC (ref 23–35)
APTT PPP: 36 SEC (ref 23–35)
AST SERPL-CCNC: 46 IU/L (ref 13–39)
BASOPHILS # BLD: 0.02 K/UL (ref 0.01–0.1)
BASOPHILS NFR BLD: 0.2 %
BILIRUB SERPL-MCNC: 0.9 MG/DL (ref 0.3–1.2)
BUN SERPL-MCNC: 29 MG/DL (ref 7–25)
CALCIUM SERPL-MCNC: 9 MG/DL (ref 8.6–10.3)
CHLORIDE SERPL-SCNC: 91 MEQ/L (ref 98–107)
CHOLEST SERPL-MCNC: 100 MG/DL
CO2 SERPL-SCNC: 21 MEQ/L (ref 21–31)
CREAT SERPL-MCNC: 1 MG/DL (ref 0.6–1.2)
DIFFERENTIAL METHOD BLD: ABNORMAL
DIGOXIN SERPL-MCNC: 1.1 NG/ML (ref 0.8–2)
EGFRCR SERPLBLD CKD-EPI 2021: 56.7 ML/MIN/1.73M*2
EOSINOPHIL # BLD: 0 K/UL (ref 0.04–0.36)
EOSINOPHIL NFR BLD: 0 %
ERYTHROCYTE [DISTWIDTH] IN BLOOD BY AUTOMATED COUNT: 13.2 % (ref 11.7–14.4)
FLUAV RNA SPEC QL NAA+PROBE: NEGATIVE
FLUBV RNA SPEC QL NAA+PROBE: NEGATIVE
GLUCOSE BLD-MCNC: 211 MG/DL (ref 70–99)
GLUCOSE BLD-MCNC: 235 MG/DL (ref 70–99)
GLUCOSE SERPL-MCNC: 310 MG/DL (ref 70–99)
HCT VFR BLD AUTO: 45.1 % (ref 35–45)
HDLC SERPL-MCNC: 36 MG/DL
HDLC SERPL: 2.8 {RATIO}
HGB BLD-MCNC: 14.6 G/DL (ref 11.8–15.7)
IMM GRANULOCYTES # BLD AUTO: 0.05 K/UL (ref 0–0.08)
IMM GRANULOCYTES NFR BLD AUTO: 0.4 %
INR PPP: 2.6
LDLC SERPL CALC-MCNC: 46 MG/DL
LYMPHOCYTES # BLD: 0.94 K/UL (ref 1.2–3.5)
LYMPHOCYTES NFR BLD: 7.9 %
MAGNESIUM SERPL-MCNC: 1.7 MG/DL (ref 1.8–2.5)
MCH RBC QN AUTO: 30.2 PG (ref 28–33.2)
MCHC RBC AUTO-ENTMCNC: 32.4 G/DL (ref 32.2–35.5)
MCV RBC AUTO: 93.2 FL (ref 83–98)
MONOCYTES # BLD: 0.6 K/UL (ref 0.28–0.8)
MONOCYTES NFR BLD: 5 %
MRSA DNA SPEC QL NAA+PROBE: NEGATIVE
NEUTROPHILS # BLD: 10.35 K/UL (ref 1.7–7)
NEUTS SEG NFR BLD: 86.5 %
NONHDLC SERPL-MCNC: 64 MG/DL
NRBC BLD-RTO: 0 %
PLATELET # BLD AUTO: 331 K/UL (ref 150–369)
PMV BLD AUTO: 10 FL (ref 9.4–12.3)
POCT TEST: ABNORMAL
POCT TEST: ABNORMAL
POTASSIUM SERPL-SCNC: 4.4 MEQ/L (ref 3.5–5.1)
PROT SERPL-MCNC: 7.5 G/DL (ref 6–8.2)
PROTHROMBIN TIME: 27.7 SEC (ref 12.2–14.5)
RBC # BLD AUTO: 4.84 M/UL (ref 3.93–5.22)
RSV RNA SPEC QL NAA+PROBE: POSITIVE
SARS-COV-2 RNA RESP QL NAA+PROBE: NEGATIVE
SODIUM SERPL-SCNC: 128 MEQ/L (ref 136–145)
TRIGL SERPL-MCNC: 90 MG/DL
TROPONIN I SERPL HS-MCNC: 1603.8 PG/ML
TROPONIN I SERPL HS-MCNC: 1645.3 PG/ML
TROPONIN I SERPL HS-MCNC: 1848.3 PG/ML
WBC # BLD AUTO: 11.96 K/UL (ref 3.8–10.5)

## 2025-01-04 PROCEDURE — 83735 ASSAY OF MAGNESIUM: CPT | Performed by: INTERNAL MEDICINE

## 2025-01-04 PROCEDURE — 94660 CPAP INITIATION&MGMT: CPT

## 2025-01-04 PROCEDURE — 84484 ASSAY OF TROPONIN QUANT: CPT | Performed by: EMERGENCY MEDICINE

## 2025-01-04 PROCEDURE — 80061 LIPID PANEL: CPT | Performed by: INTERNAL MEDICINE

## 2025-01-04 PROCEDURE — 87637 SARSCOV2&INF A&B&RSV AMP PRB: CPT | Performed by: EMERGENCY MEDICINE

## 2025-01-04 PROCEDURE — 99223 1ST HOSP IP/OBS HIGH 75: CPT | Performed by: INTERNAL MEDICINE

## 2025-01-04 PROCEDURE — 36415 COLL VENOUS BLD VENIPUNCTURE: CPT

## 2025-01-04 PROCEDURE — 63700000 HC SELF-ADMINISTRABLE DRUG: Performed by: EMERGENCY MEDICINE

## 2025-01-04 PROCEDURE — 85025 COMPLETE CBC W/AUTO DIFF WBC: CPT | Performed by: EMERGENCY MEDICINE

## 2025-01-04 PROCEDURE — 63700000 HC SELF-ADMINISTRABLE DRUG: Performed by: INTERNAL MEDICINE

## 2025-01-04 PROCEDURE — 63600000 HC DRUGS/DETAIL CODE: Mod: JZ,TB | Performed by: INTERNAL MEDICINE

## 2025-01-04 PROCEDURE — 96374 THER/PROPH/DIAG INJ IV PUSH: CPT

## 2025-01-04 PROCEDURE — 85610 PROTHROMBIN TIME: CPT | Performed by: EMERGENCY MEDICINE

## 2025-01-04 PROCEDURE — 71045 X-RAY EXAM CHEST 1 VIEW: CPT

## 2025-01-04 PROCEDURE — 87641 MR-STAPH DNA AMP PROBE: CPT | Performed by: INTERNAL MEDICINE

## 2025-01-04 PROCEDURE — 80053 COMPREHEN METABOLIC PANEL: CPT | Performed by: EMERGENCY MEDICINE

## 2025-01-04 PROCEDURE — 99285 EMERGENCY DEPT VISIT HI MDM: CPT | Mod: 25

## 2025-01-04 PROCEDURE — 25800000 HC PHARMACY IV SOLUTIONS: Performed by: INTERNAL MEDICINE

## 2025-01-04 PROCEDURE — 25800000 HC PHARMACY IV SOLUTIONS: Performed by: EMERGENCY MEDICINE

## 2025-01-04 PROCEDURE — 85730 THROMBOPLASTIN TIME PARTIAL: CPT | Performed by: INTERNAL MEDICINE

## 2025-01-04 PROCEDURE — 96372 THER/PROPH/DIAG INJ SC/IM: CPT

## 2025-01-04 PROCEDURE — 85730 THROMBOPLASTIN TIME PARTIAL: CPT | Performed by: EMERGENCY MEDICINE

## 2025-01-04 PROCEDURE — 94640 AIRWAY INHALATION TREATMENT: CPT

## 2025-01-04 PROCEDURE — 93005 ELECTROCARDIOGRAM TRACING: CPT | Performed by: EMERGENCY MEDICINE

## 2025-01-04 PROCEDURE — 25000000 HC PHARMACY GENERAL: Performed by: EMERGENCY MEDICINE

## 2025-01-04 PROCEDURE — 25000000 HC PHARMACY GENERAL: Performed by: INTERNAL MEDICINE

## 2025-01-04 PROCEDURE — 63600000 HC DRUGS/DETAIL CODE: Mod: JZ | Performed by: EMERGENCY MEDICINE

## 2025-01-04 PROCEDURE — 84484 ASSAY OF TROPONIN QUANT: CPT | Performed by: INTERNAL MEDICINE

## 2025-01-04 PROCEDURE — 80162 ASSAY OF DIGOXIN TOTAL: CPT | Performed by: EMERGENCY MEDICINE

## 2025-01-04 PROCEDURE — 20000000 HC ROOM AND CARE ICU

## 2025-01-04 PROCEDURE — 63600000 HC DRUGS/DETAIL CODE: Performed by: INTERNAL MEDICINE

## 2025-01-04 PROCEDURE — 83036 HEMOGLOBIN GLYCOSYLATED A1C: CPT | Performed by: INTERNAL MEDICINE

## 2025-01-04 PROCEDURE — 96375 TX/PRO/DX INJ NEW DRUG ADDON: CPT

## 2025-01-04 RX ORDER — ALBUTEROL SULFATE 2.5 MG/.5ML
7.5 SOLUTION RESPIRATORY (INHALATION) ONCE
Status: COMPLETED | OUTPATIENT
Start: 2025-01-04 | End: 2025-01-04

## 2025-01-04 RX ORDER — HYDRALAZINE HYDROCHLORIDE 20 MG/ML
5 INJECTION INTRAMUSCULAR; INTRAVENOUS ONCE
Status: COMPLETED | OUTPATIENT
Start: 2025-01-04 | End: 2025-01-04

## 2025-01-04 RX ORDER — DEXTROSE 50 % IN WATER (D50W) INTRAVENOUS SYRINGE
25 AS NEEDED
Status: DISCONTINUED | OUTPATIENT
Start: 2025-01-04 | End: 2025-01-14 | Stop reason: HOSPADM

## 2025-01-04 RX ORDER — DEXTROSE 40 %
15-30 GEL (GRAM) ORAL AS NEEDED
Status: DISCONTINUED | OUTPATIENT
Start: 2025-01-04 | End: 2025-01-14 | Stop reason: HOSPADM

## 2025-01-04 RX ORDER — ONDANSETRON HYDROCHLORIDE 2 MG/ML
4 INJECTION, SOLUTION INTRAVENOUS EVERY 6 HOURS PRN
Status: DISCONTINUED | OUTPATIENT
Start: 2025-01-04 | End: 2025-01-14 | Stop reason: HOSPADM

## 2025-01-04 RX ORDER — IBUPROFEN 200 MG
16-32 TABLET ORAL AS NEEDED
Status: DISCONTINUED | OUTPATIENT
Start: 2025-01-04 | End: 2025-01-14 | Stop reason: HOSPADM

## 2025-01-04 RX ORDER — HEPARIN SODIUM 10000 [USP'U]/100ML
100-4000 INJECTION, SOLUTION INTRAVENOUS
Status: DISCONTINUED | OUTPATIENT
Start: 2025-01-04 | End: 2025-01-06

## 2025-01-04 RX ORDER — ASPIRIN 81 MG/1
81 TABLET ORAL DAILY
Status: DISCONTINUED | OUTPATIENT
Start: 2025-01-05 | End: 2025-01-09

## 2025-01-04 RX ORDER — BENZONATATE 100 MG/1
100 CAPSULE ORAL 3 TIMES DAILY PRN
Status: DISCONTINUED | OUTPATIENT
Start: 2025-01-04 | End: 2025-01-14 | Stop reason: HOSPADM

## 2025-01-04 RX ORDER — INSULIN LISPRO 100 [IU]/ML
3-5 INJECTION, SOLUTION INTRAVENOUS; SUBCUTANEOUS
Status: DISCONTINUED | OUTPATIENT
Start: 2025-01-05 | End: 2025-01-14 | Stop reason: HOSPADM

## 2025-01-04 RX ORDER — FAMOTIDINE 20 MG/1
20 TABLET, FILM COATED ORAL NIGHTLY
Status: DISCONTINUED | OUTPATIENT
Start: 2025-01-04 | End: 2025-01-14 | Stop reason: HOSPADM

## 2025-01-04 RX ORDER — IPRATROPIUM BROMIDE AND ALBUTEROL SULFATE 2.5; .5 MG/3ML; MG/3ML
3 SOLUTION RESPIRATORY (INHALATION) ONCE
Status: COMPLETED | OUTPATIENT
Start: 2025-01-04 | End: 2025-01-04

## 2025-01-04 RX ORDER — NAPROXEN SODIUM 220 MG/1
324 TABLET, FILM COATED ORAL ONCE
Status: COMPLETED | OUTPATIENT
Start: 2025-01-04 | End: 2025-01-04

## 2025-01-04 RX ORDER — ATORVASTATIN CALCIUM 80 MG/1
80 TABLET, FILM COATED ORAL
Status: DISCONTINUED | OUTPATIENT
Start: 2025-01-04 | End: 2025-01-09

## 2025-01-04 RX ORDER — IPRATROPIUM BROMIDE AND ALBUTEROL SULFATE 2.5; .5 MG/3ML; MG/3ML
3 SOLUTION RESPIRATORY (INHALATION) EVERY 6 HOURS
Status: DISCONTINUED | OUTPATIENT
Start: 2025-01-04 | End: 2025-01-06

## 2025-01-04 RX ORDER — INSULIN LISPRO 100 [IU]/ML
3-5 INJECTION, SOLUTION INTRAVENOUS; SUBCUTANEOUS
Status: DISCONTINUED | OUTPATIENT
Start: 2025-01-04 | End: 2025-01-04

## 2025-01-04 RX ORDER — BUDESONIDE 0.5 MG/2ML
0.5 INHALANT ORAL
Status: DISCONTINUED | OUTPATIENT
Start: 2025-01-04 | End: 2025-01-06

## 2025-01-04 RX ORDER — GUAIFENESIN 600 MG/1
1200 TABLET, EXTENDED RELEASE ORAL 2 TIMES DAILY
Status: DISCONTINUED | OUTPATIENT
Start: 2025-01-04 | End: 2025-01-14 | Stop reason: HOSPADM

## 2025-01-04 RX ADMIN — BUDESONIDE 0.5 MG: 0.5 INHALANT ORAL at 19:33

## 2025-01-04 RX ADMIN — ONDANSETRON HYDROCHLORIDE 4 MG: 2 INJECTION, SOLUTION INTRAMUSCULAR; INTRAVENOUS at 15:54

## 2025-01-04 RX ADMIN — HEPARIN SODIUM 650 UNITS/HR: 10000 INJECTION, SOLUTION INTRAVENOUS at 13:52

## 2025-01-04 RX ADMIN — IPRATROPIUM BROMIDE AND ALBUTEROL SULFATE 3 ML: .5; 3 SOLUTION RESPIRATORY (INHALATION) at 12:04

## 2025-01-04 RX ADMIN — BENZONATATE 100 MG: 100 CAPSULE ORAL at 15:53

## 2025-01-04 RX ADMIN — ATORVASTATIN CALCIUM 80 MG: 80 TABLET, FILM COATED ORAL at 15:54

## 2025-01-04 RX ADMIN — ASPIRIN 81 MG CHEWABLE TABLET 324 MG: 81 TABLET CHEWABLE at 13:44

## 2025-01-04 RX ADMIN — ALBUTEROL SULFATE 7.5 MG: 2.5 SOLUTION RESPIRATORY (INHALATION) at 12:44

## 2025-01-04 RX ADMIN — INSULIN LISPRO 3 UNITS: 100 INJECTION, SOLUTION INTRAVENOUS; SUBCUTANEOUS at 14:30

## 2025-01-04 RX ADMIN — IPRATROPIUM BROMIDE AND ALBUTEROL SULFATE 3 ML: .5; 3 SOLUTION RESPIRATORY (INHALATION) at 13:57

## 2025-01-04 RX ADMIN — SODIUM CHLORIDE 250 ML: 9 INJECTION, SOLUTION INTRAVENOUS at 12:37

## 2025-01-04 RX ADMIN — HYDRALAZINE HYDROCHLORIDE 5 MG: 20 INJECTION INTRAMUSCULAR; INTRAVENOUS at 12:36

## 2025-01-04 RX ADMIN — METHYLPREDNISOLONE SODIUM SUCCINATE 60 MG: 125 INJECTION, POWDER, FOR SOLUTION INTRAMUSCULAR; INTRAVENOUS at 12:04

## 2025-01-04 RX ADMIN — IPRATROPIUM BROMIDE AND ALBUTEROL SULFATE 3 ML: .5; 3 SOLUTION RESPIRATORY (INHALATION) at 19:33

## 2025-01-04 RX ADMIN — METHYLPREDNISOLONE SODIUM SUCCINATE 40 MG: 40 INJECTION, POWDER, FOR SOLUTION INTRAMUSCULAR; INTRAVENOUS at 20:58

## 2025-01-04 RX ADMIN — HUMAN INSULIN 2 UNITS: 100 INJECTION, SOLUTION SUBCUTANEOUS at 12:37

## 2025-01-04 RX ADMIN — AZITHROMYCIN MONOHYDRATE 500 MG: 500 INJECTION, POWDER, LYOPHILIZED, FOR SOLUTION INTRAVENOUS at 13:46

## 2025-01-04 RX ADMIN — INSULIN LISPRO 3 UNITS: 100 INJECTION, SOLUTION INTRAVENOUS; SUBCUTANEOUS at 20:59

## 2025-01-04 ASSESSMENT — ENCOUNTER SYMPTOMS
MEMORY LOSS: 0
FOCAL WEAKNESS: 0
LIGHT-HEADEDNESS: 0
FEVER: 0
DIAPHORESIS: 0
SNORING: 0
PHOTOPHOBIA: 0
DIARRHEA: 0
CLAUDICATION: 0
POOR WOUND HEALING: 0
PALPITATIONS: 0
NAUSEA: 0
RIGHT EYE: 0
HEADACHES: 0
NEAR-SYNCOPE: 0
FALLS: 0
CONSTIPATION: 0
SORE THROAT: 0
ABDOMINAL PAIN: 0
WEIGHT GAIN: 0
BLURRED VISION: 0
HEMOPTYSIS: 0
LOSS OF BALANCE: 0
SHORTNESS OF BREATH: 1
FREQUENCY: 0
DOUBLE VISION: 0
VOMITING: 0
WHEEZING: 0
SYNCOPE: 0
DEPRESSION: 0
COUGH: 1
SPUTUM PRODUCTION: 1
LEFT EYE: 0
CHILLS: 0
COLOR CHANGE: 0
JOINT SWELLING: 0
NUMBNESS: 0
WEIGHT LOSS: 0
DIZZINESS: 0
WEAKNESS: 0
ORTHOPNEA: 0
SLEEP DISTURBANCES DUE TO BREATHING: 0
ALTERED MENTAL STATUS: 0
NIGHT SWEATS: 0
POLYDIPSIA: 0
PND: 0
DYSPNEA ON EXERTION: 1
DECREASED APPETITE: 0
INSOMNIA: 0
BACK PAIN: 0

## 2025-01-04 ASSESSMENT — COGNITIVE AND FUNCTIONAL STATUS - GENERAL
MOVING TO AND FROM BED TO CHAIR: 2 - A LOT
STANDING UP FROM CHAIR USING ARMS: 2 - A LOT
WALKING IN HOSPITAL ROOM: 2 - A LOT
CLIMB 3 TO 5 STEPS WITH RAILING: 2 - A LOT

## 2025-01-04 NOTE — ASSESSMENT & PLAN NOTE
Elevated troponin with ischemic ECG changes concerning for subendocardial ischemia. No CP to suggest acute coronary syndrome    Tele monitoring  EKG if symptomatic   IV heparin for total 48 hours  aspirin and statin  Transthoracic echocardiogram Monday  Plan cardiac catheterization pending clinical course.  pt agreeable.; Low threshold to expedite if develops chest pain  BP/HR control

## 2025-01-04 NOTE — ASSESSMENT & PLAN NOTE
Likely to have significant coronary artery disease given elevated troponin with diffuse ischemic ECG changes and risk factors.     Plan as above

## 2025-01-04 NOTE — H&P
Critical Care H&P note    LOS: 0  ICU LOS: Patient does not have an ICU stay during this admission.    POD#: N/A  Procedure:   N/A    Subjective: Patient seen and examined.      Summary: 79-year-old female with past medical history of asthma, A-fib on Coumadin, diverticulitis, DM2 who presented to the emergency department 1/4 With increasing shortness of breath.  She follows in the outpatient office with Dr. Mcdaniel is maintained on Breo and Singulair.  She was recently started on steroids and a Z-Enrrique in the outpatient setting.  Complains of a cough productive of tan secretions as well as wheezing.  Has used her albuterol at home with mild to moderate relief.    Subjective  Adequate oxygenation on 6 L/min  Afebrile  Cough with tan secretions    Allergies:   Reglan [metoclopramide hcl], Amoxicillin, Bactrim [sulfamethoxazole-trimethoprim], Formaldehyde, and Latex    Past Medical History:   Diagnosis Date    Asthma     Atrial fibrillation (CMS/HCC)     COPD (chronic obstructive pulmonary disease) (CMS/[facility])     Diverticulitis     ruptured    Type 2 diabetes mellitus (CMS/HCC)      Past Surgical History   Procedure Laterality Date    Cataract extraction, bilateral      Hernia repair      Laparoscopic colon resection       No family history on file.  Social History     Socioeconomic History    Marital status: Single     Spouse name: Not on file    Number of children: Not on file    Years of education: Not on file    Highest education level: Not on file   Occupational History    Not on file   Tobacco Use    Smoking status: Never    Smokeless tobacco: Never   Substance and Sexual Activity    Alcohol use: Yes     Comment: socially    Drug use: No    Sexual activity: Defer   Other Topics Concern    Not on file   Social History Narrative    Not on file     Social Drivers of Health     Financial Resource Strain: Not on file   Food Insecurity: No Food Insecurity (1/4/2025)    Hunger Vital Sign     Worried About Running Out of  Food in the Last Year: Never true     Ran Out of Food in the Last Year: Never true   Transportation Needs: Not on file   Physical Activity: Not on file   Stress: Not on file   Social Connections: Not on file   Intimate Partner Violence: Not on file   Housing Stability: Not on file     Medications:  Current Facility-Administered Medications   Medication Dose Route Frequency Provider Last Rate Last Admin    azithromycin (ZITHROMAX) IVPB 500 mg in 250 mL NSS vial in bag  500 mg intravenous q24h INT FlwilliammTeresitaKamaljit A,  mL/hr at 01/04/25 1346 500 mg at 01/04/25 1346    glucose chewable tablet 16-32 g of dextrose  16-32 g of dextrose oral PRN Flaim, Kamaljit A, DO        Or    dextrose 40 % oral gel 15-30 g of dextrose  15-30 g of dextrose oral PRN Flaim, Kamaljit A, DO        Or    glucagon (GLUCAGEN) injection 1 mg  1 mg intramuscular PRN FlTeresita velardeony A, DO        Or    dextrose 50 % in water (D50) injection 12.5 g  25 mL intravenous PRN Teresita Mccartyony A, DO        famotidine (PEPCID) tablet 20 mg  20 mg oral Nightly Flaim, Kamaljit A, DO        heparin (porcine) bolus from bag 2,200-4,350 Units  40-80 Units/kg (Adjusted) intravenous q6h PRN Joe Cuevas MD        heparin infusion in D5W 100 units/mL  100-4,000 Units/hr intravenous Titrated Joe Cuevas MD 6.5 mL/hr at 01/04/25 1352 650 Units/hr at 01/04/25 1352    insulin lispro U-100 (HumaLOG) pen 3-5 Units  3-5 Units subcutaneous QID (8a, 12p, 4p, 8p) Bibiana Kamaljit A, DO        ipratropium-albuteroL (DUO-NEB) 0.5-2.5 mg/3 mL nebulizer solution 3 mL  3 mL nebulization q6h MAGAN Flaibrie Kamaljit A, DO   3 mL at 01/04/25 1357    methylPREDNISolone sod suc(PF) (SOLU-Medrol) injection 40 mg  40 mg intravenous q8h INT Teresita Mccartyony A, DO        sodium chloride 0.9 % bolus 250 mL  250 mL intravenous Once Joe Cuevas  mL/hr at 01/04/25 1237 250 mL at 01/04/25 1237     Current Outpatient Medications   Medication Sig Dispense Refill     "atorvastatin (LIPITOR) 10 mg tablet Take 10 mg by mouth once daily.  5    DIGOX 125 mcg tablet Take 125 mcg by mouth daily.        JANUVIA 100 mg tablet Take 100 mg by mouth daily.        metFORMIN (GLUCOPHAGE) 500 mg tablet Take 1,000 mg by mouth 2 (two) times a day with meals.        metoprolol succinate XL (TOPROL-XL) 50 mg 24 hr tablet Take 50 mg by mouth daily.        montelukast (SINGULAIR) 10 mg tablet Take 10 mg by mouth every evening.    3    warfarin (COUMADIN) 2.5 mg tablet Take 2.5 mg by mouth 2 (two) times a week (Mon, Thu).        warfarin (COUMADIN) 2.5 mg tablet Take 3.75 mg by mouth 5 (five) times a week.         Vasoactive Agents:  None    Review of Systems:  All other review of systems negative unless otherwise specified in HPI    Objective     Input/Ouput in Last 24 hours:  No intake or output data in the 24 hours ending 01/04/25 1402    Vital Signs for the last 24 hours:  Visit Vitals  BP (!) 165/95 (BP Location: Right upper arm, Patient Position: Sitting)   Pulse 93   Temp (!) 35.6 °C (96 °F) (Tympanic)   Resp (!) 22   Ht 1.473 m (4' 10\")   Wt 65.8 kg (145 lb 1.6 oz)   SpO2 99%   BMI 30.33 kg/m²       Mode of Ventilation:  6 L/min    Oxygen:  Oxygen Therapy: Supplemental oxygen  O2 Delivery Method: Aerosol mask     O2 Flow Rate (L/min): 6 L/min     Lines:  Peripheral IV    Physical Exam:  Physical Exam  Vitals and nursing note reviewed.   Constitutional:       Appearance: Normal appearance. She is not ill-appearing.   HENT:      Head: Normocephalic and atraumatic.      Right Ear: External ear normal.      Left Ear: External ear normal.      Nose: Nose normal.      Mouth/Throat:      Mouth: Mucous membranes are moist.   Eyes:      General: No scleral icterus.  Cardiovascular:      Rate and Rhythm: Regular rhythm. Tachycardia present.      Heart sounds: Normal heart sounds. No murmur heard.  Pulmonary:      Effort: No respiratory distress.      Breath sounds: Wheezing present. No rhonchi or " rales.      Comments: Moderately tachypneic  Abdominal:      General: Abdomen is flat. Bowel sounds are normal. There is no distension.      Palpations: Abdomen is soft.      Tenderness: There is no abdominal tenderness. There is no guarding.   Musculoskeletal:      Cervical back: Neck supple.      Right lower leg: Edema present.      Left lower leg: Edema present.   Skin:     General: Skin is warm and dry.   Neurological:      General: No focal deficit present.      Mental Status: She is alert and oriented to person, place, and time.   Psychiatric:         Mood and Affect: Mood normal.         Behavior: Behavior normal.         Labs:      Lab Results   Component Value Date    WBC 11.96 (H) 01/04/2025    HGB 14.6 01/04/2025    HCT 45.1 (H) 01/04/2025     01/04/2025    ALT 11 01/04/2025    AST 46 (H) 01/04/2025     (L) 01/04/2025    K 4.4 01/04/2025    CL 91 (L) 01/04/2025    CREATININE 1.0 01/04/2025    BUN 29 (H) 01/04/2025    CO2 21 01/04/2025    INR 2.6 01/04/2025    HGBA1C 8 (H) 07/30/2024       Imaging/Radiology: Personally Reviewed  CXR: No acute disease    EKG/Telemetry: Personally Reviewed  sinus tachycardia    Medical ICU IMPRESSION AND PLAN :    Acute hypoxic respiratory failure  Baseline  Due to/as below  Wean FiO2 for SpO2 greater than or equal to 90%  Currently on 6 L/min  Encourage out of bed to chair incentive spirometry    Exacerbation of asthma  Follows in the outpatient setting with Dr. Mcdaniel and is maintained on high-dose Breo, as needed albuterol, and Singulair  Spirometry done on 11/22 shows small airway disease and coexisting restriction with an FEV1 of 66% predicted  Solu-Medrol 40 mg every 8 hours  Azithromycin 500 mg daily x 3 days  DuoNebs every 6 hours around-the-clock  Budesonide via nebulizer twice daily  Mucinex 1200 mg twice daily and flutter valve for pulmonary toilet  Check sputum culture if patient is able to provide  Outpatient follow-up with   [facility]    DM2  Sliding scale and Accu-Cheks  C hyper troponinemia urrently hyperglycemic, likely in the setting of steroid use    Hyperproteinemia  Hold Coumadin and start heparin drip per cardiology  Anticipate heparin drip for 48 hours  Ischemic EKG changes concerning for subendocardial ischemia  Aspirin and statin  Check echo  Check A1c and lipid panel for risk stratification    RSV  Enhanced contact/droplet precautions per guidelines  Supportive care    Hyperlipidemia  High intensity statin    A-fib  Hold Coumadin use heparin drip as discussed above    Diet: N.p.o. except meds  DVT prophylaxis:  Heparin drip  GI prophylaxis: H2 blocker  Code Status: Full Code    Case d/w: Patient, ER physician, RN    I have personally and independently spent critical care time on this patient excluding any procedure time = 41 minutes.    Disposition: Monitor in ICU    The case was reviewed this AM at multidisciplinary rounds with the patient's nurse, dietician, pharmacist, respiratory therapist, , , and critical care nurse coordinator. The patient's clinical status with regards to diagnosis and treatment plans including disposition of any IV, arterial lines, mccall, and tubes were discussed as well as dietary, respiratory therapy, and mobilization needs. This process required approximately 10 minutes of coordination of care.    Kamaljit Mccarty, DO

## 2025-01-04 NOTE — ASSESSMENT & PLAN NOTE
Remains rate controlled.    Tele monitoring, ok for PCU from CV standpoint  Resume Toprol XL  Holding coumadin with plan for LHC, continue heparin gtt x 48 hours total

## 2025-01-04 NOTE — ED PROVIDER NOTES
Emergency Medicine Note  HPI   HISTORY OF PRESENT ILLNESS     80 y/o pmh afib, on coumadin, copd (not on 02), c/o sob / wheezing for about 4 days.  No fevers.  + cough (tan color sputum).  No chest pain.     + increasing SOB.    Given steroids and z pack by her pulmonary doctor (Dr. Mcdaniel).      Using inhalers.           Patient History   PAST HISTORY     Reviewed from Nursing Triage:       Past Medical History:   Diagnosis Date    Asthma     Atrial fibrillation (CMS/HCC)     COPD (chronic obstructive pulmonary disease) (CMS/HCC)     Diverticulitis     ruptured    Type 2 diabetes mellitus (CMS/HCC)        Past Surgical History   Procedure Laterality Date    Cataract extraction, bilateral      Hernia repair      Laparoscopic colon resection         No family history on file.    Social History     Tobacco Use    Smoking status: Never    Smokeless tobacco: Never   Substance Use Topics    Alcohol use: Yes     Comment: socially    Drug use: No         Review of Systems   REVIEW OF SYSTEMS     Review of Systems      VITALS     ED Vitals      Date/Time Temp Pulse Resp BP SpO2 [facility]   01/04/25 1246 -- 81 25 169/83 96 % [facility]   01/04/25 1224 -- 82 34 194/93 98 % [facility]   01/04/25 1143 -- 81 28 216/97 99 % [facility]   01/04/25 1129 -- 82 30 -- 99 % [facility]   01/04/25 1056 35.6 °C (96 °F) 86 28 179/80 75 % [facility]          Pulse Ox %: (!) 75 % (01/04/25 1158)  Pulse Ox Interpretation: Normal (01/04/25 1158)           Physical Exam   PHYSICAL EXAM     Physical Exam  Vitals and nursing note reviewed.   Constitutional:       Appearance: She is well-developed.   HENT:      Head: Normocephalic and atraumatic.   Eyes:      Conjunctiva/sclera: Conjunctivae normal.   Cardiovascular:      Rate and Rhythm: Normal rate. Rhythm irregular.   Pulmonary:      Effort: Pulmonary effort is normal. Tachypnea present.      Breath sounds: Decreased breath sounds and wheezing present.   Abdominal:      General: There is no distension.      Palpations: Abdomen is soft.  There is no mass.      Tenderness: There is no abdominal tenderness.   Musculoskeletal:         General: No tenderness or deformity. Normal range of motion.      Cervical back: Normal range of motion.      Right lower leg: Edema present.      Left lower leg: Edema present.   Skin:     General: Skin is warm and dry.   Neurological:      Mental Status: She is alert. Mental status is at baseline.   Psychiatric:         Behavior: Behavior normal.           PROCEDURES     Critical Care    Performed by: Joe Cuevas MD  Authorized by: Joe Cuevas MD    Critical care provider statement:     Critical care time (minutes):  35    Critical care time was exclusive of:  Separately billable procedures and treating other patients    Critical care was time spent personally by me on the following activities:  Re-evaluation of patient's condition, ordering and review of laboratory studies, ordering and performing treatments and interventions, ordering and review of radiographic studies, obtaining history from patient or surrogate, evaluation of patient's response to treatment, development of treatment plan with patient or surrogate, examination of patient, pulse oximetry and discussions with consultants       DATA     Results       Procedure Component Value Units Date/Time    HS Troponin (with 2 hour reflex) [[id number]]  (Abnormal) Collected: 01/04/25 1105    Specimen: Blood, Venous Updated: 01/04/25 1227     High Sens Troponin I 1,645.3 pg/mL     SARS-COV-2 (COVID-19)/ FLU A/B, AND RSV, PCR Nasopharynx [[id number]]  (Abnormal) Collected: 01/04/25 1106    Specimen: Nasopharyngeal Swab from Nasopharynx Updated: 01/04/25 1216     SARS-CoV-2 (COVID-19) Negative     Influenza A Negative     Influenza B Negative     Respiratory Syncytial Virus Positive    Narrative:      Testing performed using real-time PCR for detection of COVID-19. EUA approved validation studies performed on site.     Digoxin level [[id number]]   (Normal) Collected: 01/04/25 1133    Specimen: Blood, Venous Updated: 01/04/25 1204     Digoxin Lvl 1.1 ng/mL     Comprehensive metabolic panel [[id number]]  (Abnormal) Collected: 01/04/25 1105    Specimen: Blood, Venous Updated: 01/04/25 1203     Sodium 128 mEQ/L      Potassium 4.4 mEQ/L      Comment: Results obtained on plasma. Plasma Potassium values may be up to 0.4 mEQ/L less than serum values. The differences may be greater for patients with high platelet or white cell counts.        Chloride 91 mEQ/L      CO2 21 mEQ/L      BUN 29 mg/dL      Creatinine 1.0 mg/dL      Glucose 310 mg/dL      Calcium 9.0 mg/dL      AST (SGOT) 46 IU/L      ALT (SGPT) 11 IU/L      Alkaline Phosphatase 89 IU/L      Total Protein 7.5 g/dL      Comment: Test performed on plasma which typically contains approximately 0.4 g/dL more protein than serum.        Albumin 4.3 g/dL      Bilirubin, Total 0.9 mg/dL      eGFR 56.7 mL/min/1.73m*2      Comment: Calculation based on the Chronic Kidney Disease Epidemiology Collaboration (CKD-EPI) equation refit without adjustment for race.        Anion Gap 16 mEQ/L     [place] Draw Panel [[id number]] Collected: 01/04/25 1133    Specimen: Blood, Venous Updated: 01/04/25 1141    Narrative:      The following orders were created for panel order [place] Draw Panel.  Procedure                               Abnormality         Status                     ---------                               -----------         ------                     RAINBOW GOLD[[id number]]                                     In process                   Please view results for these tests on the individual orders.    RAINBOW GOLD [[id number]] Collected: 01/04/25 1133    Specimen: Blood, Venous Updated: 01/04/25 1141    Protime-INR [[id number]]  (Abnormal) Collected: 01/04/25 1105    Specimen: Blood, Venous Updated: 01/04/25 1140     PT 27.7 sec      INR 2.6     Comment: Moderate Intensity Anticoagulation = 2.0 to 3.0, High Intensity =  2.5 to 3.5       PTT [[id number]]  (Abnormal) Collected: 01/04/25 1105    Specimen: Blood, Venous Updated: 01/04/25 1140     PTT 36 sec      Comment: The Standard Therapeutic Range for Heparin is 74 to 106 seconds.       CBC and differential [[id number]]  (Abnormal) Collected: 01/04/25 1105    Specimen: Blood, Venous Updated: 01/04/25 1125     WBC 11.96 K/uL      RBC 4.84 M/uL      Hemoglobin 14.6 g/dL      Hematocrit 45.1 %      MCV 93.2 fL      MCH 30.2 pg      MCHC 32.4 g/dL      RDW 13.2 %      Platelets 331 K/uL      MPV 10.0 fL      Differential Type Auto     nRBC 0.0 %      Immature Granulocytes 0.4 %      Neutrophils 86.5 %      Lymphocytes 7.9 %      Monocytes 5.0 %      Eosinophils 0.0 %      Basophils 0.2 %      Immature Granulocytes, Absolute 0.05 K/uL      Neutrophils, Absolute 10.35 K/uL      Lymphocytes, Absolute 0.94 K/uL      Monocytes, Absolute 0.60 K/uL      Eosinophils, Absolute 0.00 K/uL      Basophils, Absolute 0.02 K/uL             Imaging Results              X-RAY CHEST 1 VIEW (Final result)  Result time 01/04/25 11:20:03      Final result                   Impression:    IMPRESSION:  Cardiomegaly                   Narrative:      CLINICAL HISTORY:  SOB    COMMENT:    Views: Portable frontal    Comparison date: 12/26/2018.    The heart and mediastinal contours are within intact with cardiomegaly.  The  patient is rotated towards the right.  The lungs are clear without discrete  infiltrate.  There  are no pleural effusions.  The osseous structures are intact  with degenerative change.                                          ECG 12 lead   Independent Interpretation by ED Provider   Afib 78, no st elevation, st depression inferior, biphasic v2 v3.            Scoring tools                                  ED Course & MDM   MDM / ED COURSE / CLINICAL IMPRESSION / DISPO     Medical Decision Making  Problems Addressed:  COPD exacerbation (CMS/HCC): acute illness or injury  RSV bronchitis: acute  illness or injury    Amount and/or Complexity of Data Reviewed  Labs: ordered.  Radiology: ordered and independent interpretation performed.     Details: No infiltrate  ECG/medicine tests: ordered and independent interpretation performed. Decision-making details documented in ED Course.  Discussion of management or test interpretation with external provider(s): Cards  ICU    Risk  OTC drugs.  Prescription drug management.  Decision regarding hospitalization.        ED Course as of 01/04/25 1323   Sat Jan 04, 2025   1226 Diffuse wheezing noted on exam.  Hypoxic on arrival satting well on 2 L.  IV steroids neb ordered.  Blood pressure elevated denies history of hypertension. [SM]   1231 Case discussed with Dr. Cho he will come see patient given elevated troponin and new EKG changes patient again is denying any chest pain [SM]   1258 Seen by Dr. Cho, rec. Load Asa, heparin (aware on coumadin).  No cath at this time.   [SM]   1320 Seen by ICU will admit patient [SM]      ED Course User Index  [SM] Joe Cuevas MD     Clinical Impression      RSV bronchitis   ACS (acute coronary syndrome) (CMS/[facility])   COPD exacerbation (CMS/[facility])     _________________       ED Disposition   Admit / Observation                       Joe Cuevas MD  01/04/25 1323

## 2025-01-04 NOTE — CONSULTS
Cardiology   Consult Note       Overview: 79-year-old woman presented with shortness of breath and cough     Assessment/Plan   Assessment & Plan  A-fib (CMS/[facility])  Would anticoagulate with heparin for now given elevated troponin    Elevated troponin  Elevated troponin with ischemic ECG changes concerning for subendocardial ischemia  This is most likely representative of underlying significant coronary disease that is manifesting due to acute respiratory distress and profound hypoxia  No chest pain to suggest acute coronary syndrome  We will treat her with IV heparin for 48 hours  Also recommend starting aspirin and statin  Transthoracic echocardiogram  Based on her clinical progress she may undergo cardiac catheterization at some stage when she recovers from the acute respiratory distress    Dyslipidemia  Recommend high intensity statin    CAD (coronary artery disease)  Likely to have significant coronary artery disease given elevated troponin with diffuse ischemic ECG changes  She also has multiple risk factors for coronary disease including age, diabetes and dyslipidemia  Given acute respiratory distress related to RSV infection would recommend conservative management for coronary disease for the time being.  We will consider our management options later in her course of therapy this hospitalization and based on clinical progress          Subjective    HPI: The patient is an 79-year-old woman who presented to the hospital with shortness of breath.  Symptoms started on New Year's Day with worsening shortness of breath with associated productive cough.  No chest pain/pressure or tightness.  She does not think that she had a fever.  Her only cardiac history that she is aware of is that of atrial fibrillation.  She sees a cardiologist in [place] for that and is chronically anticoagulated with warfarin  At the time of arrival to the ER she was in significant respiratory distress, respiratory rate ranging between  28 and 34.  Her initial oxygen saturation was 75% and her blood pressure has also been significantly elevated with a systolic ranging between 179 and 216 mmHg  She was started on supplemental oxygen, improving oxygen saturations to 98%.  Blood pressure decreased to 169/83.  She remains in respiratory distress, tachypneic while sitting up and profoundly wheezy  Upon presentation an ECG was performed and this showed ischemic ST segment changes both in the anterior and inferior leads.  Her initial troponin was elevated at 1645.   Review of Systems   Constitutional: Negative for chills, decreased appetite, diaphoresis, fever, night sweats, weight gain and weight loss.   HENT:  Negative for congestion, hearing loss, nosebleeds and sore throat.    Eyes:  Negative for blurred vision, double vision, photophobia, vision loss in left eye, vision loss in right eye and visual disturbance.   Cardiovascular:  Positive for dyspnea on exertion. Negative for chest pain, claudication, cyanosis, leg swelling, near-syncope, orthopnea, palpitations, paroxysmal nocturnal dyspnea and syncope.   Respiratory:  Positive for cough, shortness of breath and sputum production. Negative for hemoptysis, sleep disturbances due to breathing, snoring and wheezing.    Endocrine: Negative for cold intolerance, heat intolerance, polydipsia and polyuria.   Hematologic/Lymphatic: Negative for bleeding problem.   Skin:  Negative for color change, dry skin, poor wound healing and rash.   Musculoskeletal:  Negative for arthritis, back pain, falls, joint pain and joint swelling.   Gastrointestinal:  Negative for abdominal pain, constipation, diarrhea, melena, nausea and vomiting.   Genitourinary:  Negative for frequency.   Neurological:  Negative for dizziness, focal weakness, headaches, light-headedness, loss of balance, numbness and weakness.   Psychiatric/Behavioral:  Negative for altered mental status, depression and memory loss. The patient does not have  insomnia.       Histories:    Medical History:   Past Medical History:   Diagnosis Date    Asthma     Atrial fibrillation (CMS/HCC)     COPD (chronic obstructive pulmonary disease) (CMS/HCC)     Diverticulitis     ruptured    Type 2 diabetes mellitus (CMS/HCC)        Surgical History:   Past Surgical History   Procedure Laterality Date    Cataract extraction, bilateral      Hernia repair      Laparoscopic colon resection         Social History:    Social History     Tobacco Use   Smoking Status Never   Smokeless Tobacco Never     Social History     Social History Narrative    Not on file       Family History:   No family history on file.     Allergies:   Reglan [metoclopramide hcl], Amoxicillin, Bactrim [sulfamethoxazole-trimethoprim], Formaldehyde, and Latex   Current Medications:    Scheduled:   albuterol  7.5 mg continuous nebulization Once    aspirin  324 mg oral Once    heparin (porcine)  60 Units/kg (Adjusted) intravenous Once    sodium chloride  250 mL intravenous Once       Infusions:   heparin infusion - MAR calculator by PTT  100-4,000 Units/hr         PRN:    heparin (porcine)   Objective   Vital signs in last 24 hours:  Temp:  [35.6 °C (96 °F)] 35.6 °C (96 °F)  Heart Rate:  [81-86] 81  Resp:  [25-34] 25  BP: (169-216)/(80-97) 169/83    Wt Readings from Last 1 Encounters:   01/04/25 65.8 kg (145 lb 1.6 oz)             Physical Exam:  General Appearance:  Alert, in significant distress   Head:  Normocephalic, without obvious abnormality, atraumatic   Eyes:  Conjunctiva/corneas clear, EOM's intact   Neck: No thyroid enlargement. jugular vein not well seen. No bruits     Lungs:   Diffuse wheezing   Heart:  Irregular rhythm,no murmur heard   Abdomen:   Soft, non-tender, no masses, no organomegaly   Vascular: Pulses 2+ and symmetric all extremities, no carotid bruit    Musculoskeletal:  Skin: No injury or deformity  Skin color, texture, turgor normal, no rashes or lesions, no cyanosis    Extremities: Mild  bilateral lower extremity edema   Behavior/Emotional: Appropriate, cooperative   Neurologic:                    Awake, Alert and Oriented x3,No focal deficit      Labs and Data:     Lab Results   Component Value Date    WBC 11.96 (H) 01/04/2025    HGB 14.6 01/04/2025    HCT 45.1 (H) 01/04/2025     01/04/2025    ALT 11 01/04/2025    AST 46 (H) 01/04/2025     (L) 01/04/2025    K 4.4 01/04/2025    CL 91 (L) 01/04/2025    CREATININE 1.0 01/04/2025    BUN 29 (H) 01/04/2025    CO2 21 01/04/2025    INR 2.6 01/04/2025    HGBA1C 8 (H) 07/30/2024    HSTROPONINI 1,645.3 (HH) 01/04/2025       Radiology:  N/A    Telemetry  Atrial fibrillation     ECG   Atrial fibrillation with inferior ST depression and biphasic T waves in the anterolateral leads concerning for ischemia       Carine Cho MD  1/4/2025

## 2025-01-04 NOTE — PLAN OF CARE
Plan of Care Review  Plan of Care Reviewed With: patient, sibling  Progress: no change  Outcome Evaluation: Recvd pt from ED,  AAO x4,  6L, Pt RR=38, belly breathing, labored, BiPAP ordered and placed, Pt c/o nausea-Back on 6L, + cough-tesselon pearls admin, AFib, Monioring.  Post zofran  + tessalon tarun- Pt asleep now RR =24 with less labored breathing. Still abdominal breathing but states she feels somewhat better then initially. Denies nausea.   Approx 1840-pt lethargic but oriented, placed on Bipap.

## 2025-01-05 PROBLEM — I21.4 NSTEMI (NON-ST ELEVATED MYOCARDIAL INFARCTION) (CMS/HCC): Status: ACTIVE | Noted: 2025-01-04

## 2025-01-05 LAB
ANION GAP SERPL CALC-SCNC: 9 MEQ/L (ref 3–15)
APTT PPP: 130 SEC (ref 23–35)
APTT PPP: 68 SEC (ref 23–35)
APTT PPP: 98 SEC (ref 23–35)
ATRIAL RATE: 108
BASOPHILS # BLD: 0.02 K/UL (ref 0.01–0.1)
BASOPHILS NFR BLD: 0.2 %
BUN SERPL-MCNC: 36 MG/DL (ref 7–25)
CALCIUM SERPL-MCNC: 8.5 MG/DL (ref 8.6–10.3)
CHLORIDE SERPL-SCNC: 96 MEQ/L (ref 98–107)
CO2 SERPL-SCNC: 27 MEQ/L (ref 21–31)
CREAT SERPL-MCNC: 0.9 MG/DL (ref 0.6–1.2)
DIFFERENTIAL METHOD BLD: ABNORMAL
EGFRCR SERPLBLD CKD-EPI 2021: >60 ML/MIN/1.73M*2
EOSINOPHIL # BLD: 0 K/UL (ref 0.04–0.36)
EOSINOPHIL NFR BLD: 0 %
ERYTHROCYTE [DISTWIDTH] IN BLOOD BY AUTOMATED COUNT: 13.2 % (ref 11.7–14.4)
EST. AVERAGE GLUCOSE BLD GHB EST-MCNC: 169 MG/DL
GLUCOSE BLD-MCNC: 180 MG/DL (ref 70–99)
GLUCOSE BLD-MCNC: 217 MG/DL (ref 70–99)
GLUCOSE BLD-MCNC: 251 MG/DL (ref 70–99)
GLUCOSE BLD-MCNC: 253 MG/DL (ref 70–99)
GLUCOSE SERPL-MCNC: 193 MG/DL (ref 70–99)
HBA1C MFR BLD: 7.5 %
HCT VFR BLD AUTO: 41.9 % (ref 35–45)
HGB BLD-MCNC: 13.7 G/DL (ref 11.8–15.7)
IMM GRANULOCYTES # BLD AUTO: 0.04 K/UL (ref 0–0.08)
IMM GRANULOCYTES NFR BLD AUTO: 0.3 %
LYMPHOCYTES # BLD: 0.57 K/UL (ref 1.2–3.5)
LYMPHOCYTES NFR BLD: 4.4 %
MAGNESIUM SERPL-MCNC: 1.8 MG/DL (ref 1.8–2.5)
MCH RBC QN AUTO: 30.3 PG (ref 28–33.2)
MCHC RBC AUTO-ENTMCNC: 32.7 G/DL (ref 32.2–35.5)
MCV RBC AUTO: 92.7 FL (ref 83–98)
MONOCYTES # BLD: 0.41 K/UL (ref 0.28–0.8)
MONOCYTES NFR BLD: 3.2 %
NEUTROPHILS # BLD: 11.77 K/UL (ref 1.7–7)
NEUTS SEG NFR BLD: 91.9 %
NRBC BLD-RTO: 0 %
PHOSPHATE SERPL-MCNC: 4.6 MG/DL (ref 2.4–4.7)
PLATELET # BLD AUTO: 274 K/UL (ref 150–369)
PMV BLD AUTO: 10.1 FL (ref 9.4–12.3)
POCT TEST: ABNORMAL
POTASSIUM SERPL-SCNC: 4.2 MEQ/L (ref 3.5–5.1)
QRS DURATION: 94
QT INTERVAL: 394
QTC CALCULATION(BAZETT): 449
R AXIS: 61
RBC # BLD AUTO: 4.52 M/UL (ref 3.93–5.22)
SODIUM SERPL-SCNC: 132 MEQ/L (ref 136–145)
T WAVE AXIS: 225
VENTRICULAR RATE: 78
WBC # BLD AUTO: 12.81 K/UL (ref 3.8–10.5)

## 2025-01-05 PROCEDURE — 80048 BASIC METABOLIC PNL TOTAL CA: CPT | Performed by: INTERNAL MEDICINE

## 2025-01-05 PROCEDURE — 85730 THROMBOPLASTIN TIME PARTIAL: CPT | Performed by: INTERNAL MEDICINE

## 2025-01-05 PROCEDURE — 94660 CPAP INITIATION&MGMT: CPT

## 2025-01-05 PROCEDURE — 99223 1ST HOSP IP/OBS HIGH 75: CPT | Mod: FS | Performed by: INTERNAL MEDICINE

## 2025-01-05 PROCEDURE — 83735 ASSAY OF MAGNESIUM: CPT | Performed by: INTERNAL MEDICINE

## 2025-01-05 PROCEDURE — 94667 MNPJ CHEST WALL 1ST: CPT

## 2025-01-05 PROCEDURE — 85025 COMPLETE CBC W/AUTO DIFF WBC: CPT | Performed by: INTERNAL MEDICINE

## 2025-01-05 PROCEDURE — 36415 COLL VENOUS BLD VENIPUNCTURE: CPT | Performed by: INTERNAL MEDICINE

## 2025-01-05 PROCEDURE — 20000000 HC ROOM AND CARE ICU

## 2025-01-05 PROCEDURE — 94640 AIRWAY INHALATION TREATMENT: CPT

## 2025-01-05 PROCEDURE — 63600000 HC DRUGS/DETAIL CODE: Performed by: INTERNAL MEDICINE

## 2025-01-05 PROCEDURE — 93010 ELECTROCARDIOGRAM REPORT: CPT | Performed by: INTERNAL MEDICINE

## 2025-01-05 PROCEDURE — 63700000 HC SELF-ADMINISTRABLE DRUG: Performed by: INTERNAL MEDICINE

## 2025-01-05 PROCEDURE — 25000000 HC PHARMACY GENERAL: Performed by: INTERNAL MEDICINE

## 2025-01-05 PROCEDURE — 84100 ASSAY OF PHOSPHORUS: CPT | Performed by: INTERNAL MEDICINE

## 2025-01-05 PROCEDURE — 25800000 HC PHARMACY IV SOLUTIONS: Performed by: INTERNAL MEDICINE

## 2025-01-05 RX ORDER — METOPROLOL TARTRATE 1 MG/ML
5 INJECTION, SOLUTION INTRAVENOUS ONCE
Status: COMPLETED | OUTPATIENT
Start: 2025-01-05 | End: 2025-01-05

## 2025-01-05 RX ORDER — MONTELUKAST SODIUM 10 MG/1
10 TABLET ORAL NIGHTLY
Status: DISCONTINUED | OUTPATIENT
Start: 2025-01-05 | End: 2025-01-14 | Stop reason: HOSPADM

## 2025-01-05 RX ADMIN — METOPROLOL TARTRATE 5 MG: 1 INJECTION, SOLUTION INTRAVENOUS at 18:49

## 2025-01-05 RX ADMIN — IPRATROPIUM BROMIDE AND ALBUTEROL SULFATE 3 ML: .5; 3 SOLUTION RESPIRATORY (INHALATION) at 07:35

## 2025-01-05 RX ADMIN — INSULIN LISPRO 3 UNITS: 100 INJECTION, SOLUTION INTRAVENOUS; SUBCUTANEOUS at 17:46

## 2025-01-05 RX ADMIN — GUAIFENESIN 1200 MG: 600 TABLET, EXTENDED RELEASE ORAL at 20:10

## 2025-01-05 RX ADMIN — IPRATROPIUM BROMIDE AND ALBUTEROL SULFATE 3 ML: .5; 3 SOLUTION RESPIRATORY (INHALATION) at 14:54

## 2025-01-05 RX ADMIN — METHYLPREDNISOLONE SODIUM SUCCINATE 40 MG: 40 INJECTION, POWDER, FOR SOLUTION INTRAMUSCULAR; INTRAVENOUS at 03:36

## 2025-01-05 RX ADMIN — GUAIFENESIN 1200 MG: 600 TABLET, EXTENDED RELEASE ORAL at 09:58

## 2025-01-05 RX ADMIN — MAGNESIUM SULFATE HEPTAHYDRATE 2 G: 40 INJECTION, SOLUTION INTRAVENOUS at 09:59

## 2025-01-05 RX ADMIN — INSULIN LISPRO 3 UNITS: 100 INJECTION, SOLUTION INTRAVENOUS; SUBCUTANEOUS at 08:30

## 2025-01-05 RX ADMIN — ATORVASTATIN CALCIUM 80 MG: 80 TABLET, FILM COATED ORAL at 17:46

## 2025-01-05 RX ADMIN — IPRATROPIUM BROMIDE AND ALBUTEROL SULFATE 3 ML: .5; 3 SOLUTION RESPIRATORY (INHALATION) at 20:13

## 2025-01-05 RX ADMIN — FAMOTIDINE 20 MG: 20 TABLET, FILM COATED ORAL at 22:34

## 2025-01-05 RX ADMIN — METHYLPREDNISOLONE SODIUM SUCCINATE 40 MG: 40 INJECTION, POWDER, FOR SOLUTION INTRAMUSCULAR; INTRAVENOUS at 20:08

## 2025-01-05 RX ADMIN — METHYLPREDNISOLONE SODIUM SUCCINATE 40 MG: 40 INJECTION, POWDER, FOR SOLUTION INTRAMUSCULAR; INTRAVENOUS at 11:56

## 2025-01-05 RX ADMIN — AZITHROMYCIN MONOHYDRATE 500 MG: 500 INJECTION, POWDER, LYOPHILIZED, FOR SOLUTION INTRAVENOUS at 14:02

## 2025-01-05 RX ADMIN — BENZONATATE 100 MG: 100 CAPSULE ORAL at 11:56

## 2025-01-05 RX ADMIN — INSULIN LISPRO 3 UNITS: 100 INJECTION, SOLUTION INTRAVENOUS; SUBCUTANEOUS at 22:39

## 2025-01-05 RX ADMIN — IPRATROPIUM BROMIDE AND ALBUTEROL SULFATE 3 ML: .5; 3 SOLUTION RESPIRATORY (INHALATION) at 02:11

## 2025-01-05 RX ADMIN — BUDESONIDE 0.5 MG: 0.5 INHALANT ORAL at 07:35

## 2025-01-05 RX ADMIN — INSULIN LISPRO 3 UNITS: 100 INJECTION, SOLUTION INTRAVENOUS; SUBCUTANEOUS at 12:03

## 2025-01-05 RX ADMIN — BUDESONIDE 0.5 MG: 0.5 INHALANT ORAL at 20:13

## 2025-01-05 RX ADMIN — ASPIRIN 81 MG: 81 TABLET, COATED ORAL at 09:59

## 2025-01-05 RX ADMIN — MONTELUKAST 10 MG: 10 TABLET, FILM COATED ORAL at 22:34

## 2025-01-05 ASSESSMENT — COGNITIVE AND FUNCTIONAL STATUS - GENERAL
MOVING TO AND FROM BED TO CHAIR: 2 - A LOT
STANDING UP FROM CHAIR USING ARMS: 2 - A LOT
CLIMB 3 TO 5 STEPS WITH RAILING: 3 - A LITTLE
WALKING IN HOSPITAL ROOM: 2 - A LOT

## 2025-01-05 NOTE — PLAN OF CARE
Care Coordination Admission Assessment Note    General Information:  Readmission Within the last 30 days: no previous admission in last 30 days  Does patient have a :    Patient-Specific Goals (include timeframe): Spoke with sister, Anjlai. Wants to get stronger and get better    Living Arrangements:  Arrived From: home  Current Living Arrangements: home  People in Home: alone  Home Accessibility: not wheelchair accessible, stairs to enter home (Group), stairs within home (Group)  Living Arrangement Comments: Lives alone in a town home (row home) with one step to enter and full flight to second floor    Housing Stability and Utility Access (SDOH):  In the last 12 months, was there a time when you were not able to pay the mortgage or rent on time?: No  In the past 12 months, how many times have you moved?: 0  At any time in the past 12 months, were you homeless or living in a shelter (including now)?: No  In the past 12 months has the electric, gas, oil, or water company threatened to shut off services in your home?: No    Functional Status Prior to Admission:   Assistive Device/Animal Currently Used at Home: cane, straight, inhaler  Functional Status Comments: Ambulates with a cane due to her knee pain, has put off getting surgery. Drives within the community.  IADL Comments: Independent     Supports and Services:  Current Outpatient/Agency/Support Group:    Type of Current Home Care Services:    History of home care episode or rehab stay:      Discharge Needs Assessment:   Concerns to be Addressed: care coordination/care conferences, discharge planning  Current Discharge Risk:    Anticipated Changes Related to Illness:      Patient/Family Anticipated Discharge Plan:  Patient/Family Anticipates Transition To: skilled nursing facility  Patient/Family Anticipated Services at Transition:      Connection to Community       Patient Choice:   Offered/Gave Vendor List:    Patient's Choice of Community  Agency(s):         Anticipated Discharge Plan:  Met with patient. Provided education and contact information for Care Coordination services.: yes (Due to diagnosis,  SW called sister Anjali for information)  Anticipated Discharge Disposition: home with home health, skilled nursing facility  Type of Home Care Services: home OT, home PT, nursing    Type of Skilled Nursing Care Services: OT, PT, nursing    Transportation Needs (SDOH):  Transportation Concerns: none  Transportation Anticipated: family or friend will provide, car, drives self  Is Out of Hospital DNR needed at discharge?: no    In the past 12 months, has lack of transportation kept you from medical appointments or from getting medications?: No  In the past 12 months, has lack of transportation kept you from meetings, work, or from getting things needed for daily living?: No    Concerns - comments:  SW called sister Anjali for information. Patient lives alone and drives within the community to her doctor's and grocery shopping. Has inhalers at home, sister feels that she could benefit from oxygen. Patient lives 45 minutes away from her family. Patient ambulates with a cane. PCP is Dr. Yasmani Guerrero and pharmacy is [facility] in [place].

## 2025-01-05 NOTE — PROGRESS NOTES
Inpatient Cardiology   Daily Progress Note       Overview:  79-year-old female PMH Afib, of asthma, Asthma,  DM2 who presented with SOB/cough. + Asthma exacerbation/RSV. +NSTEMI. Heparin x 48 hours. Will plan LHC early this week pending pulmonary stability     Assessment/Plan   Assessment & Plan  Obstructive chronic bronchitis with exacerbation (CMS/[facility])  Nebs, Steroids, pulmonary toilet, oxygen as needed   Further management per Pulmonary (consulted this admission)  A-fib (CMS/[facility])  Remains rate controlled.    Tele monitoring, ok for PCU from CV standpoint  Resume Toprol XL  Holding coumadin with plan for LHC, continue heparin gtt x 48 hours total  NSTEMI (non-ST elevated myocardial infarction) (CMS/[facility])  Elevated troponin with ischemic ECG changes concerning for subendocardial ischemia. No CP to suggest acute coronary syndrome    Tele monitoring  EKG if symptomatic   IV heparin for total 48 hours  aspirin and statin  Transthoracic echocardiogram Monday  Plan cardiac catheterization pending clinical course.  pt agreeable.; Low threshold to expedite if develops chest pain  BP/HR control  Dyslipidemia  FLP at goal, con't high intensity statin for now, will likely decrease dosing at some point  CAD (coronary artery disease)  Likely to have significant coronary artery disease given elevated troponin with diffuse ischemic ECG changes and risk factors.     Plan as above  DM (diabetes mellitus) (CMS/[facility])  Blood sugars per primary team.   Statin, Would consider low dose ACE/ARB/ARNI pending blood pressure allowance         Subjective    Feeling less SOB. Denies CP, palpitations, dizziness.       Current Medications:    Scheduled:   aspirin  81 mg oral Daily    atorvastatin  80 mg oral Daily (6p)    azithromycin  500 mg intravenous q24h INT    budesonide  0.5 mg nebulization BID (8a, 8p)    famotidine  20 mg oral Nightly    guaiFENesin  1,200 mg oral BID    insulin lispro U-100  3-5 Units subcutaneous Before meals &  nightly    ipratropium-albuteroL  3 mL nebulization q6h MAGAN    magnesium sulfate  2 g intravenous Once    methylPREDNISolone sodium succinate  40 mg intravenous q8h INT    montelukast  10 mg oral Nightly       Infusions:   heparin infusion - MAR calculator by PTT  100-4,000 Units/hr 550 Units/hr (01/05/25 0448)       PRN:    benzonatate    glucose **OR** dextrose **OR** glucagon **OR** dextrose 50 % in water (D50)    heparin (porcine)    ondansetron     Objective   Vital signs in last 24 hours:  Temp:  [35.6 °C (96 °F)-37 °C (98.6 °F)] 37 °C (98.6 °F)  Heart Rate:  [73-94] 86  Resp:  [18-39] 38  BP: (126-216)/(57-97) 169/80  FiO2 (%) (Set):  [36 %-40 %] 36 %    Weights (last 5 days)       Date/Time Weight    01/05/25 0537 65.8 kg (145 lb 1 oz)    01/04/25 1056 65.8 kg (145 lb 1.6 oz)            Intake/Output Summary (Last 24 hours) at 1/5/2025 0659  Last data filed at 1/5/2025 0400  24 Hour Net Input/Output from 7AM Yesterday   Intake 591.87 ml   Output --   Net 591.87 ml     Net IO Since Admission: 591.87 mL [01/05/25 0936]    Physical Exam  Vitals reviewed.   Constitutional:       Appearance: She is normal weight. She is ill-appearing.   HENT:      Head: Normocephalic and atraumatic.      Nose: Nose normal.      Mouth/Throat:      Mouth: Mucous membranes are moist.   Cardiovascular:      Rate and Rhythm: Normal rate. Rhythm irregular.      Pulses: Normal pulses.      Heart sounds: Normal heart sounds.   Pulmonary:      Effort: Pulmonary effort is normal.      Breath sounds: Wheezing present.   Abdominal:      General: Abdomen is flat. Bowel sounds are normal.      Palpations: Abdomen is soft.   Musculoskeletal:      Right lower leg: No edema.      Left lower leg: No edema.   Skin:     General: Skin is warm and dry.   Neurological:      Mental Status: She is alert and oriented to person, place, and time.   Psychiatric:         Mood and Affect: Mood normal.         Behavior: Behavior normal.              Labs and  Data:      Hematology    Results from last 7 days   Lab Units 01/05/25  0340 01/04/25  1105   WBC K/uL 12.81* 11.96*   HEMOGLOBIN g/dL 13.7 14.6   HEMATOCRIT % 41.9 45.1*   PLATELETS K/uL 274 331   INR   --  2.6       Chemistries    Results from last 7 days   Lab Units 01/05/25  0340 01/04/25  1133 01/04/25  1105   SODIUM mEQ/L 132*  --  128*   POTASSIUM mEQ/L 4.2  --  4.4   CHLORIDE mEQ/L 96*  --  91*   CREATININE mg/dL 0.9  --  1.0   BUN mg/dL 36*  --  29*   CO2 mEQ/L 27  --  21   GLUCOSE mg/dL 193*  --  310*   CALCIUM mg/dL 8.5*  --  9.0   MAGNESIUM mg/dL 1.8 1.7*  --    ALT IU/L  --   --  11   AST IU/L  --   --  46*       Biomarkers    Results from last 7 days   Lab Units 01/04/25  1719 01/04/25  1330 01/04/25  1105   HIGH SENSITIVE TROPONIN I pg/mL 1,603.8* 1,848.3* 1,645.3*       Cholesterol    Lab Results   Component Value Date    CHOL 100 01/04/2025    TRIG 90 01/04/2025    HDL 36 (L) 01/04/2025    LDLCALC 46 01/04/2025    NONHDLCALC 64 01/04/2025       Endocrine    Lab Results   Component Value Date    HGBA1C 8 (H) 07/30/2024      Radiology:  I have independently reviewed the pertinent imaging from the last 24 hrs.    X-RAY CHEST 1 VIEW    Result Date: 1/4/2025  IMPRESSION: Cardiomegaly       Cardiology:    Telemetry  rate controlled atrial fibrillation       ZAIN Mora  1/5/2025

## 2025-01-05 NOTE — PROGRESS NOTES
Critical Care Daily progress note    LOS: 1  ICU LOS: 17h    POD#: N/A  Procedure:   N/A    Subjective: Patient seen and examined.      Summary: 79-year-old female with past medical history of asthma, A-fib on Coumadin, diverticulitis, DM2 who presented to the emergency department 1/4 With increasing shortness of breath.  She follows in the outpatient office with Dr. Mcdaniel is maintained on Breo and Singulair.  She was recently started on steroids and a Z-Enrrique in the outpatient setting.  Complains of a cough productive of tan secretions as well as wheezing.  Has used her albuterol at home with mild to moderate relief.    Subjective  Oxygenating on 4 L/min low flow cannula  Tolerated BiPAP overnight  Feels improved  Afebrile  Cough with tan secretions which are now looser    Allergies:   Reglan [metoclopramide hcl], Amoxicillin, Bactrim [sulfamethoxazole-trimethoprim], Formaldehyde, and Latex    Medications:  Current Facility-Administered Medications   Medication Dose Route Frequency Provider Last Rate Last Admin    aspirin enteric coated tablet 81 mg  81 mg oral Daily Flaim, Kamaljit A, DO        atorvastatin (LIPITOR) tablet 80 mg  80 mg oral Daily (6p) Flaim, Kamaljit A, DO   80 mg at 01/04/25 1554    azithromycin (ZITHROMAX) IVPB 500 mg in 250 mL NSS vial in bag  500 mg intravenous q24h INT Flaim, Kamaljit A, DO   Stopped at 01/04/25 1547    benzonatate (TESSALON) capsule 100 mg  100 mg oral 3x daily PRN Flaim, Kamaljit A, DO   100 mg at 01/04/25 1553    budesonide (PULMICORT) 0.5 mg/2 mL nebulizer solution 0.5 mg  0.5 mg nebulization BID (8a, 8p) Flaim, Kamaljit A, DO   0.5 mg at 01/05/25 0735    glucose chewable tablet 16-32 g of dextrose  16-32 g of dextrose oral PRN Flaim, Kamaljit A, DO        Or    dextrose 40 % oral gel 15-30 g of dextrose  15-30 g of dextrose oral PRN Flaim, Kamaljit A, DO        Or    glucagon (GLUCAGEN) injection 1 mg  1 mg intramuscular PRN Flaim, Kamaljit A, DO        Or    dextrose 50 % in  "water (D50) injection 12.5 g  25 mL intravenous PRN Flaim, Kamaljit A, DO        famotidine (PEPCID) tablet 20 mg  20 mg oral Nightly Flaim, Kamaljit A, DO        guaiFENesin (MUCINEX) 12 hr ER tablet 1,200 mg  1,200 mg oral BID Flaim, Kamaljit A, DO        heparin (porcine) bolus from bag 2,200-4,350 Units  40-80 Units/kg (Adjusted) intravenous q6h PRN Joe Cuevas MD        heparin infusion in D5W 100 units/mL  100-4,000 Units/hr intravenous Titrated Joe Cuevas MD 5.5 mL/hr at 01/05/25 0448 550 Units/hr at 01/05/25 0448    insulin lispro U-100 (HumaLOG) pen 3-5 Units  3-5 Units subcutaneous Before meals & nightly Flaim, Kamaljit A, DO   3 Units at 01/05/25 0830    ipratropium-albuteroL (DUO-NEB) 0.5-2.5 mg/3 mL nebulizer solution 3 mL  3 mL nebulization q6h MAGAN Flaim, Kamaljit A, DO   3 mL at 01/05/25 0735    methylPREDNISolone sod suc(PF) (SOLU-Medrol) injection 40 mg  40 mg intravenous q8h INT Flaim, Kamaljit A, DO   40 mg at 01/05/25 0336    ondansetron (ZOFRAN) injection 4 mg  4 mg intravenous q6h PRN Flaim, Kamaljit A, DO   4 mg at 01/04/25 1554       Vasoactive Agents:  None    Review of Systems:  All other review of systems negative unless otherwise specified in HPI    Objective     Input/Ouput in Last 24 hours:    Intake/Output Summary (Last 24 hours) at 1/5/2025 0851  Last data filed at 1/5/2025 0400  Gross per 24 hour   Intake 591.87 ml   Output --   Net 591.87 ml       Vital Signs for the last 24 hours:  Visit Vitals  BP (!) 169/80   Pulse 86   Temp 37 °C (98.6 °F) (Temporal)   Resp (!) 38   Ht 1.473 m (4' 10\")   Wt 65.8 kg (145 lb 1 oz)   SpO2 96%   BMI 30.32 kg/m²       Mode of Ventilation:  4 L/min    Oxygen:  Oxygen Therapy: Supplemental oxygen  O2 Delivery Method: Nasal cannula humidified  FiO2 (%) (Set): 36 %  O2 Flow Rate (L/min): 4 L/min     Lines:  Peripheral IV    Physical Exam:  Physical Exam  Vitals and nursing note reviewed.   Constitutional:       General: She is not in acute " distress.     Appearance: Normal appearance. She is not ill-appearing.   HENT:      Head: Normocephalic and atraumatic.      Right Ear: External ear normal.      Left Ear: External ear normal.      Nose: Nose normal.      Mouth/Throat:      Mouth: Mucous membranes are moist.   Eyes:      General: No scleral icterus.  Cardiovascular:      Rate and Rhythm: Normal rate. Rhythm irregular.      Heart sounds: Normal heart sounds. No murmur heard.  Pulmonary:      Effort: No respiratory distress.      Breath sounds: Wheezing present. No rhonchi or rales.   Abdominal:      General: Abdomen is flat. Bowel sounds are normal. There is no distension.      Palpations: Abdomen is soft.      Tenderness: There is no abdominal tenderness. There is no guarding.   Musculoskeletal:      Cervical back: Neck supple.      Right lower leg: No edema.      Left lower leg: No edema.   Skin:     General: Skin is warm and dry.   Neurological:      General: No focal deficit present.      Mental Status: She is alert and oriented to person, place, and time.   Psychiatric:         Mood and Affect: Mood normal.         Behavior: Behavior normal.         Labs:      Lab Results   Component Value Date    WBC 12.81 (H) 01/05/2025    HGB 13.7 01/05/2025    HCT 41.9 01/05/2025     01/05/2025    CHOL 100 01/04/2025    TRIG 90 01/04/2025    HDL 36 (L) 01/04/2025    ALT 11 01/04/2025    AST 46 (H) 01/04/2025     (L) 01/05/2025    K 4.2 01/05/2025    CL 96 (L) 01/05/2025    CREATININE 0.9 01/05/2025    BUN 36 (H) 01/05/2025    CO2 27 01/05/2025    INR 2.6 01/04/2025    HGBA1C 8 (H) 07/30/2024       Imaging/Radiology: Personally Reviewed  No new chest imaging    EKG/Telemetry: Personally Reviewed  Rate controlled A-fib    Medical ICU IMPRESSION AND PLAN :    Acute hypoxic respiratory failure  Baseline  Due to/as below  Wean FiO2 for SpO2 greater than or equal to 90%  Currently on 4 L/min  Encourage out of bed to chair incentive  spirometry  Continue BiPAP QHS/PRN for increased WOB    Exacerbation of asthma  Follows in the outpatient setting with Dr. Mcdaniel and is maintained on high-dose Breo, as needed albuterol, and Singulair  Spirometry done on 11/22 shows small airway disease and coexisting restriction with an FEV1 of 66% predicted  Continue Solu-Medrol 40 mg every 8 hours  Azithromycin 500 mg daily x 3 days  DuoNebs every 6 hours around-the-clock  Budesonide via nebulizer twice daily  Mucinex 1200 mg twice daily and flutter valve for pulmonary toilet  Check sputum culture if patient is able to provide  Outpatient follow-up with Dr. Mcdaniel    DM2  Sliding scale and Accu-Cheks  Hyperglycemic on initial presentation in the setting of steroid use  A1c in process    Hypertropinemia  Hold Coumadin and use heparin drip per cardiology  Anticipate heparin drip for 48 hours  Ischemic EKG changes concerning for subendocardial ischemia  Continue aspirin and high-dose statin  Echo for tomorrow  A1c in process  Lipid panel reviewed    RSV  Enhanced contact/droplet precautions per guidelines  Supportive care  Steroids and bronchodilators as above    Hyperlipidemia  High intensity statin  Lipid panel reviewed    A-fib  Hold Coumadin and use heparin drip as discussed above    Obesity  Body mass index is 30.32 kg/m².  Complicates medical care    Diet: Cardiac/diabetic diet  DVT prophylaxis:  Heparin drip  GI prophylaxis: H2 blocker  Code Status: Full Code    Case d/w: Patient, cardiology, RN, Updated pt's sister Luis by phone    I have personally and independently spent critical care time on this patient excluding any procedure time = 46 minutes.    Disposition: Maintain in ICU    The case was reviewed this AM at multidisciplinary rounds with the patient's nurse, dietician, pharmacist, respiratory therapist, , , and critical care nurse coordinator. The patient's clinical status with regards to diagnosis and treatment plans  including disposition of any IV, arterial lines, mccall, and tubes were discussed as well as dietary, respiratory therapy, and mobilization needs. This process required approximately 10 minutes of coordination of care.    Kamaljit Mccarty, DO

## 2025-01-05 NOTE — ASSESSMENT & PLAN NOTE
Nebs, Steroids, pulmonary toilet, oxygen as needed   Further management per Pulmonary (consulted this admission)

## 2025-01-05 NOTE — ASSESSMENT & PLAN NOTE
Blood sugars per primary team.   Statin, Would consider low dose ACE/ARB/ARNI pending blood pressure allowance

## 2025-01-06 ENCOUNTER — APPOINTMENT (INPATIENT)
Dept: CARDIOLOGY | Facility: HOSPITAL | Age: 80
DRG: 202 | End: 2025-01-06
Attending: INTERNAL MEDICINE
Payer: MEDICARE

## 2025-01-06 PROBLEM — I10 ESSENTIAL HYPERTENSION, BENIGN: Status: ACTIVE | Noted: 2025-01-06

## 2025-01-06 PROBLEM — B33.8 RSV (RESPIRATORY SYNCYTIAL VIRUS INFECTION): Status: ACTIVE | Noted: 2025-01-06

## 2025-01-06 LAB
ANION GAP SERPL CALC-SCNC: 6 MEQ/L (ref 3–15)
AORTIC ROOT ANNULUS: 3.1 CM
AORTIC VALVE MEAN VELOCITY: 1.72 M/S
AORTIC VALVE VELOCITY TIME INTEGRAL: 43.3 CM
APTT PPP: 216 SEC (ref 23–35)
APTT PPP: 78 SEC (ref 23–35)
ASCENDING AORTA: 2.9 CM
AV MEAN GRADIENT: 14 MMHG
AV PEAK GRADIENT: 26 MMHG
AV PEAK VELOCITY-S: 2.57 M/S
AV VALVE AREA INDEX: 0.88
AV VALVE AREA: 0.95 CM2
AV VELOCITY RATIO: 0.46
AVA (VTI): 1.44 CM2
BASOPHILS # BLD: 0.02 K/UL (ref 0.01–0.1)
BASOPHILS NFR BLD: 0.1 %
BSA FOR ECHO PROCEDURE: 1.64 M2
BUN SERPL-MCNC: 51 MG/DL (ref 7–25)
CALCIUM SERPL-MCNC: 8.5 MG/DL (ref 8.6–10.3)
CHLORIDE SERPL-SCNC: 95 MEQ/L (ref 98–107)
CO2 SERPL-SCNC: 27 MEQ/L (ref 21–31)
CREAT SERPL-MCNC: 0.9 MG/DL (ref 0.6–1.2)
DIFFERENTIAL METHOD BLD: ABNORMAL
DOP CALC LVOT STROKE VOLUME: 62.17 CM3
E WAVE DECELERATION TIME: 165 MS
E/A RATIO: 3.6
E/E' RATIO: 20.5
E/LAT E' RATIO: 16.2
EDV (BP): 59.2 CM3
EF (A4C): 51.8 %
EF A2C: 57.7 %
EGFRCR SERPLBLD CKD-EPI 2021: >60 ML/MIN/1.73M*2
EJECTION FRACTION: 55.1 %
EOSINOPHIL # BLD: 0 K/UL (ref 0.04–0.36)
EOSINOPHIL NFR BLD: 0 %
ERYTHROCYTE [DISTWIDTH] IN BLOOD BY AUTOMATED COUNT: 13.2 % (ref 11.7–14.4)
EST RIGHT VENT SYSTOLIC PRESSURE BY TRICUSPID REGURGITATION JET: 63 MMHG
ESV (BP): 26.6 CM3
FRACTIONAL SHORTENING: 36.46 %
GLUCOSE BLD-MCNC: 146 MG/DL (ref 70–99)
GLUCOSE BLD-MCNC: 150 MG/DL (ref 70–99)
GLUCOSE BLD-MCNC: 231 MG/DL (ref 70–99)
GLUCOSE BLD-MCNC: 321 MG/DL (ref 70–99)
GLUCOSE SERPL-MCNC: 242 MG/DL (ref 70–99)
HCT VFR BLD AUTO: 43.3 % (ref 35–45)
HGB BLD-MCNC: 13.8 G/DL (ref 11.8–15.7)
IMM GRANULOCYTES # BLD AUTO: 0.16 K/UL (ref 0–0.08)
IMM GRANULOCYTES NFR BLD AUTO: 0.9 %
INR PPP: 2.9
INTERVENTRICULAR SEPTUM: 1.37 CM
LA ESV (BP): 53.8 CM3
LA ESV INDEX (A2C): 31.4 CM3/M2
LA ESV INDEX (BP): 32.8 CM3/M2
LA/AORTA RATIO: 1.48
LAAS-AP2: 18.5 CM2
LAAS-AP4: 19.2 CM2
LAD 2D: 4.6 CM
LALD A4C: 5.23 CM
LALD A4C: 6.38 CM
LAV-S: 51.5 CM3
LEFT ATRIUM VOLUME INDEX: 28.29 CM3/M2
LEFT ATRIUM VOLUME: 46.4 CM3
LEFT INTERNAL DIMENSION IN SYSTOLE: 2.37 CM (ref 2.29–3.46)
LEFT VENTRICLE DIASTOLIC VOLUME INDEX: 35.06 CM3/M2
LEFT VENTRICLE DIASTOLIC VOLUME: 57.5 CM3
LEFT VENTRICLE SYSTOLIC VOLUME INDEX: 16.89 CM3/M2
LEFT VENTRICLE SYSTOLIC VOLUME: 27.7 CM3
LEFT VENTRICULAR INTERNAL DIMENSION IN DIASTOLE: 3.73 CM (ref 3.85–5.35)
LEFT VENTRICULAR POSTERIOR WALL IN END DIASTOLE: 1.28 CM (ref 0.5–0.92)
LV DIASTOLIC VOLUME: 58.4 CM3
LV ESV (APICAL 2 CHAMBER): 24.7 CM3
LVAD-AP2: 21.5 CM2
LVAD-AP4: 21.8 CM2
LVAS-AP2: 12.5 CM2
LVAS-AP4: 13.4 CM2
LVEDVI(A2C): 35.61 CM3/M2
LVEDVI(BP): 36.1 CM3/M2
LVESVI(A2C): 15.06 CM3/M2
LVESVI(BP): 16.22 CM3/M2
LVLD-AP2: 6.55 CM
LVLD-AP4: 6.84 CM
LVLS-AP2: 5.46 CM
LVLS-AP4: 5.8 CM
LVOT 2D: 2 CM
LVOT A: 3.14 CM2
LVOT MG: 3 MMHG
LVOT MV: 0.78 M/S
LVOT PEAK VELOCITY: 0.99 M/S
LVOT PG: 4 MMHG
LVOT STROKE VOLUME INDEX: 37.91 ML/M2
LVOT VTI: 19.8 CM
LYMPHOCYTES # BLD: 0.78 K/UL (ref 1.2–3.5)
LYMPHOCYTES NFR BLD: 4.2 %
MAGNESIUM SERPL-MCNC: 2.4 MG/DL (ref 1.8–2.5)
MCH RBC QN AUTO: 29.6 PG (ref 28–33.2)
MCHC RBC AUTO-ENTMCNC: 31.9 G/DL (ref 32.2–35.5)
MCV RBC AUTO: 92.7 FL (ref 83–98)
MLH CV ECHO AVA INDEX VELOCITY RATIO: 0.6
MONOCYTES # BLD: 0.71 K/UL (ref 0.28–0.8)
MONOCYTES NFR BLD: 3.8 %
MV E'TISSUE VEL-LAT: 0.08 M/S
MV E'TISSUE VEL-MED: 0.07 M/S
MV MEAN GRADIENT: 4 MMHG
MV PEAK A VEL: 0.37 M/S
MV PEAK E VEL: 1.33 M/S
MV PEAK GRADIENT: 9 MMHG
MV STENOSIS PRESSURE HALF TIME: 59 MS
MV VALVE AREA BY CONTINUITY EQUATION: 2.01 CM2
MV VALVE AREA P 1/2 METHOD: 3.73 CM2
MV VTI: 30.9 CM
NEUTROPHILS # BLD: 16.94 K/UL (ref 1.7–7)
NEUTS SEG NFR BLD: 91 %
NRBC BLD-RTO: 0 %
PHOSPHATE SERPL-MCNC: 3.5 MG/DL (ref 2.4–4.7)
PLATELET # BLD AUTO: 252 K/UL (ref 150–369)
PMV BLD AUTO: 9.8 FL (ref 9.4–12.3)
POCT TEST: ABNORMAL
POSTERIOR WALL: 1.28 CM
POTASSIUM SERPL-SCNC: 4.4 MEQ/L (ref 3.5–5.1)
PROTHROMBIN TIME: 29.5 SEC (ref 12.2–14.5)
RAP: 8 MMHG
RBC # BLD AUTO: 4.67 M/UL (ref 3.93–5.22)
SODIUM SERPL-SCNC: 128 MEQ/L (ref 136–145)
TAPSE: 1.47 CM
TR MAX PG: 55.06 MMHG
TRICUSPID VALVE PEAK REGURGITATION VELOCITY: 3.71 M/S
WBC # BLD AUTO: 18.61 K/UL (ref 3.8–10.5)
Z-SCORE OF LEFT VENTRICULAR DIMENSION IN END DIASTOLE: -1.96
Z-SCORE OF LEFT VENTRICULAR DIMENSION IN END SYSTOLE: -1.36
Z-SCORE OF LEFT VENTRICULAR POSTERIOR WALL IN END DIASTOLE: 3.32

## 2025-01-06 PROCEDURE — 94640 AIRWAY INHALATION TREATMENT: CPT

## 2025-01-06 PROCEDURE — 83735 ASSAY OF MAGNESIUM: CPT | Performed by: INTERNAL MEDICINE

## 2025-01-06 PROCEDURE — 25000000 HC PHARMACY GENERAL: Performed by: INTERNAL MEDICINE

## 2025-01-06 PROCEDURE — 94660 CPAP INITIATION&MGMT: CPT

## 2025-01-06 PROCEDURE — 93005 ELECTROCARDIOGRAM TRACING: CPT | Performed by: NURSE PRACTITIONER

## 2025-01-06 PROCEDURE — 93306 TTE W/DOPPLER COMPLETE: CPT | Mod: 26 | Performed by: INTERNAL MEDICINE

## 2025-01-06 PROCEDURE — 25800000 HC PHARMACY IV SOLUTIONS: Performed by: INTERNAL MEDICINE

## 2025-01-06 PROCEDURE — 36415 COLL VENOUS BLD VENIPUNCTURE: CPT | Performed by: INTERNAL MEDICINE

## 2025-01-06 PROCEDURE — 63600000 HC DRUGS/DETAIL CODE: Mod: JZ | Performed by: PHYSICIAN ASSISTANT

## 2025-01-06 PROCEDURE — 85610 PROTHROMBIN TIME: CPT | Performed by: NURSE PRACTITIONER

## 2025-01-06 PROCEDURE — 85730 THROMBOPLASTIN TIME PARTIAL: CPT | Performed by: INTERNAL MEDICINE

## 2025-01-06 PROCEDURE — 84100 ASSAY OF PHOSPHORUS: CPT | Performed by: INTERNAL MEDICINE

## 2025-01-06 PROCEDURE — 63600000 HC DRUGS/DETAIL CODE: Mod: JZ | Performed by: INTERNAL MEDICINE

## 2025-01-06 PROCEDURE — 20000000 HC ROOM AND CARE ICU

## 2025-01-06 PROCEDURE — 85025 COMPLETE CBC W/AUTO DIFF WBC: CPT | Performed by: INTERNAL MEDICINE

## 2025-01-06 PROCEDURE — 63700000 HC SELF-ADMINISTRABLE DRUG: Performed by: INTERNAL MEDICINE

## 2025-01-06 PROCEDURE — 63600000 HC DRUGS/DETAIL CODE: Mod: JW | Performed by: NURSE PRACTITIONER

## 2025-01-06 PROCEDURE — 63700000 HC SELF-ADMINISTRABLE DRUG: Performed by: NURSE PRACTITIONER

## 2025-01-06 PROCEDURE — 99232 SBSQ HOSP IP/OBS MODERATE 35: CPT | Mod: FS | Performed by: INTERNAL MEDICINE

## 2025-01-06 PROCEDURE — 80048 BASIC METABOLIC PNL TOTAL CA: CPT | Performed by: INTERNAL MEDICINE

## 2025-01-06 PROCEDURE — 93306 TTE W/DOPPLER COMPLETE: CPT

## 2025-01-06 RX ORDER — ENOXAPARIN SODIUM 100 MG/ML
1 INJECTION SUBCUTANEOUS
Status: DISCONTINUED | OUTPATIENT
Start: 2025-01-06 | End: 2025-01-08

## 2025-01-06 RX ORDER — INSULIN LISPRO 100 [IU]/ML
4 INJECTION, SOLUTION INTRAVENOUS; SUBCUTANEOUS
Status: DISCONTINUED | OUTPATIENT
Start: 2025-01-06 | End: 2025-01-07

## 2025-01-06 RX ORDER — HYDRALAZINE HYDROCHLORIDE 20 MG/ML
10 INJECTION INTRAMUSCULAR; INTRAVENOUS EVERY 6 HOURS PRN
Status: DISCONTINUED | OUTPATIENT
Start: 2025-01-06 | End: 2025-01-14 | Stop reason: HOSPADM

## 2025-01-06 RX ORDER — IPRATROPIUM BROMIDE AND ALBUTEROL SULFATE 2.5; .5 MG/3ML; MG/3ML
3 SOLUTION RESPIRATORY (INHALATION)
Status: DISCONTINUED | OUTPATIENT
Start: 2025-01-06 | End: 2025-01-09

## 2025-01-06 RX ORDER — VALSARTAN 40 MG/1
40 TABLET ORAL DAILY
Status: DISCONTINUED | OUTPATIENT
Start: 2025-01-06 | End: 2025-01-08

## 2025-01-06 RX ORDER — AMLODIPINE BESYLATE 5 MG/1
5 TABLET ORAL DAILY
Status: DISCONTINUED | OUTPATIENT
Start: 2025-01-06 | End: 2025-01-06

## 2025-01-06 RX ORDER — METOPROLOL SUCCINATE 50 MG/1
50 TABLET, EXTENDED RELEASE ORAL DAILY
Status: DISCONTINUED | OUTPATIENT
Start: 2025-01-06 | End: 2025-01-14 | Stop reason: HOSPADM

## 2025-01-06 RX ADMIN — ASPIRIN 81 MG: 81 TABLET, COATED ORAL at 08:20

## 2025-01-06 RX ADMIN — INSULIN LISPRO 4 UNITS: 100 INJECTION, SOLUTION INTRAVENOUS; SUBCUTANEOUS at 12:24

## 2025-01-06 RX ADMIN — IPRATROPIUM BROMIDE AND ALBUTEROL SULFATE 3 ML: .5; 3 SOLUTION RESPIRATORY (INHALATION) at 02:53

## 2025-01-06 RX ADMIN — METHYLPREDNISOLONE SODIUM SUCCINATE 40 MG: 40 INJECTION, POWDER, FOR SOLUTION INTRAMUSCULAR; INTRAVENOUS at 05:32

## 2025-01-06 RX ADMIN — IPRATROPIUM BROMIDE AND ALBUTEROL SULFATE 3 ML: .5; 3 SOLUTION RESPIRATORY (INHALATION) at 07:36

## 2025-01-06 RX ADMIN — METOPROLOL SUCCINATE ER TABLETS 50 MG: 50 TABLET, FILM COATED, EXTENDED RELEASE ORAL at 10:58

## 2025-01-06 RX ADMIN — GUAIFENESIN 1200 MG: 600 TABLET, EXTENDED RELEASE ORAL at 08:20

## 2025-01-06 RX ADMIN — HEPARIN SODIUM 650 UNITS/HR: 10000 INJECTION, SOLUTION INTRAVENOUS at 05:31

## 2025-01-06 RX ADMIN — INSULIN LISPRO 4 UNITS: 100 INJECTION, SOLUTION INTRAVENOUS; SUBCUTANEOUS at 17:35

## 2025-01-06 RX ADMIN — ATORVASTATIN CALCIUM 80 MG: 80 TABLET, FILM COATED ORAL at 17:34

## 2025-01-06 RX ADMIN — VALSARTAN 40 MG: 40 TABLET ORAL at 12:09

## 2025-01-06 RX ADMIN — INSULIN LISPRO 3 UNITS: 100 INJECTION, SOLUTION INTRAVENOUS; SUBCUTANEOUS at 08:22

## 2025-01-06 RX ADMIN — AZITHROMYCIN MONOHYDRATE 500 MG: 500 INJECTION, POWDER, LYOPHILIZED, FOR SOLUTION INTRAVENOUS at 12:30

## 2025-01-06 RX ADMIN — ENOXAPARIN SODIUM 70 MG: 80 INJECTION SUBCUTANEOUS at 17:34

## 2025-01-06 RX ADMIN — FAMOTIDINE 20 MG: 20 TABLET, FILM COATED ORAL at 21:00

## 2025-01-06 RX ADMIN — MONTELUKAST 10 MG: 10 TABLET, FILM COATED ORAL at 21:00

## 2025-01-06 RX ADMIN — IPRATROPIUM BROMIDE AND ALBUTEROL SULFATE 3 ML: .5; 3 SOLUTION RESPIRATORY (INHALATION) at 20:01

## 2025-01-06 RX ADMIN — METHYLPREDNISOLONE SODIUM SUCCINATE 40 MG: 40 INJECTION, POWDER, FOR SOLUTION INTRAMUSCULAR; INTRAVENOUS at 12:09

## 2025-01-06 RX ADMIN — BUDESONIDE 0.5 MG: 0.5 INHALANT ORAL at 07:36

## 2025-01-06 RX ADMIN — METHYLPREDNISOLONE SODIUM SUCCINATE 40 MG: 40 INJECTION, POWDER, FOR SOLUTION INTRAMUSCULAR; INTRAVENOUS at 20:36

## 2025-01-06 RX ADMIN — INSULIN LISPRO 5 UNITS: 100 INJECTION, SOLUTION INTRAVENOUS; SUBCUTANEOUS at 12:24

## 2025-01-06 RX ADMIN — IPRATROPIUM BROMIDE AND ALBUTEROL SULFATE 3 ML: .5; 3 SOLUTION RESPIRATORY (INHALATION) at 14:47

## 2025-01-06 RX ADMIN — GUAIFENESIN 1200 MG: 600 TABLET, EXTENDED RELEASE ORAL at 20:36

## 2025-01-06 ASSESSMENT — COGNITIVE AND FUNCTIONAL STATUS - GENERAL
WALKING IN HOSPITAL ROOM: 2 - A LOT
MOVING TO AND FROM BED TO CHAIR: 2 - A LOT
STANDING UP FROM CHAIR USING ARMS: 2 - A LOT
STANDING UP FROM CHAIR USING ARMS: 2 - A LOT
WALKING IN HOSPITAL ROOM: 2 - A LOT
MOVING TO AND FROM BED TO CHAIR: 2 - A LOT
CLIMB 3 TO 5 STEPS WITH RAILING: 2 - A LOT
CLIMB 3 TO 5 STEPS WITH RAILING: 2 - A LOT

## 2025-01-06 ASSESSMENT — ENCOUNTER SYMPTOMS
COUGH: 0
WHEEZING: 0
SHORTNESS OF BREATH: 1
ABDOMINAL PAIN: 0

## 2025-01-06 NOTE — ASSESSMENT & PLAN NOTE
- Negative Lexiscan in 2021  - HST peak of 1800 with ST/T wave abnormalities on admission EKG (new compared to prior 2018 tracings in epic). Denies any obvious s/s of ACS (SOB/wheezing/cough with RSV)     - Continue ICU level of care for close monitoring of respiratory status per critical care team   - No need to further trend troponin   - Can discontinue heparin gtt per ACS protocol as it has been 48 hours total of medication   - Echo is complete. Prelim suggests preserved LV function. Will review and make further recommendations if indicated  - Continue aspirin and high intensity statin  - Eventually will pursue ischemic evaluation with cardiac cath, timing to be determined upon improvement in respiratory status. Hopeful for later this week.

## 2025-01-06 NOTE — ASSESSMENT & PLAN NOTE
- Known to Dr. Mcdaniel as outpatient    - Supplemental 02/bi-pap as respiratory status requires   - Nebs/ steroids / inhalers / antibiotics  - Pulmonary is following

## 2025-01-06 NOTE — PLAN OF CARE
Plan of Care Review  Progress: improving  Outcome Evaluation: alrt oriented. afib. Expiratory wheezes then rhonchi latr in the afternoon . On 4 L prongs. abd soft. Purewick abena ua, qs. Skin dry but intact. On heparin gt as per protocol

## 2025-01-06 NOTE — PROGRESS NOTES
Inpatient Cardiology   Daily Progress Note       Overview:     - 80 y/o, PMH of afib on Warfarin, asthma/COPD, HTN, DM2 who presented on 1/4 with increasing shortness of breath despite outpatient steroids and Z-krys on 1/2. Admitted for RSV and asthma exacerbation. Cardiology consulted for elevated troponin.      Assessment/Plan   Assessment & Plan  NSTEMI (non-ST elevated myocardial infarction) (CMS/[facility])  - Negative Lexiscan in 2021  - HST peak of 1800 with ST/T wave abnormalities on admission EKG (new compared to prior 2018 tracings in epic). Denies any obvious s/s of ACS (SOB/wheezing/cough with RSV)     - Continue ICU level of care for close monitoring of respiratory status per critical care team   - No need to further trend troponin   - Can discontinue heparin gtt per ACS protocol as it has been 48 hours total of medication   - Echo is complete. Prelim suggests preserved LV function. Will review and make further recommendations if indicated  - Continue aspirin and high intensity statin  - Eventually will pursue ischemic evaluation with cardiac cath, timing to be determined upon improvement in respiratory status. Hopeful for later this week.   A-fib (CMS/[facility])  - Chads- Vasc at least 5. On Warfarin. Known to Daron Avendaño (last office visit noted in 2021)     - Resume Toprol XL 50mg daily - ordered on 1/6   - Can hold off on Digoxin for now given interaction with Azithromycin. Plan to resume once antibiotic course is complete   - Heparin gtt d/c on 1/6 per ACS protocol. Start therapeutic Lovenox. Plan to transition to Warfarin post cardiac cath for goal INR 2.0- 3.0.   Essential hypertension, benign  - Toprol XL resumed 1/6   - Add Valsartan 40mg daily   - Can keep IV Hydralazine PRN   Obstructive chronic bronchitis with exacerbation (CMS/[facility])  - Known to Dr. Mcdaniel as outpatient    - Supplemental 02/bi-pap as respiratory status requires   - Nebs/ steroids / inhalers / antibiotics  - Pulmonary is  "following  Dyslipidemia  - Continue statin   DM (diabetes mellitus) (CMS/[facility])  - Glucose management per medicine   - Statin/ARB as outlined  RSV (respiratory syncytial virus infection)  - Management per pulm         Subjective   Feeling \"30-40%\" better since admission. She is now able to talk in complete sentences. Coughing frequency has improved. No CP. D/W nursing and Dr. Mcdaniel       Current Medications:    Scheduled:   aspirin  81 mg oral Daily    atorvastatin  80 mg oral Daily (6p)    enoxaparin  1 mg/kg subcutaneous q12h INT    famotidine  20 mg oral Nightly    guaiFENesin  1,200 mg oral BID    insulin lispro U-100  3-5 Units subcutaneous Before meals & nightly    insulin lispro U-100  4 Units subcutaneous TID with meals    ipratropium-albuteroL  3 mL nebulization q6h while awake    methylPREDNISolone sodium succinate  40 mg intravenous q8h INT    metoprolol succinate XL  50 mg oral Daily    montelukast  10 mg oral Nightly    valsartan  40 mg oral Daily       Infusions:      PRN:    benzonatate    glucose **OR** dextrose **OR** glucagon **OR** dextrose 50 % in water (D50)    hydrALAZINE    ondansetron     Objective   Vital signs in last 24 hours:  Temp:  [36.8 °C (98.2 °F)-37.2 °C (99 °F)] 36.8 °C (98.2 °F)  Heart Rate:  [] 81  Resp:  [15-43] 20  BP: (120-208)/() 148/71  FiO2 (%) (Set):  [36 %-40 %] 36 %    Weights (last 5 days)       Date/Time Weight    01/06/25 0844 65.8 kg (145 lb)    01/06/25 0539 66.1 kg (145 lb 11.6 oz)    01/05/25 1038 65.6 kg (144 lb 10 oz)    01/05/25 0537 65.8 kg (145 lb 1 oz)    01/04/25 1056 65.8 kg (145 lb 1.6 oz)            Intake/Output Summary (Last 24 hours) at 1/6/2025 0659  Last data filed at 1/6/2025 0653  24 Hour Net Input/Output from 7AM Yesterday   Intake 559.39 ml   Output 550 ml   Net 9.39 ml     Net IO Since Admission: 1,339.09 mL [01/06/25 7758]    Physical Exam  Vitals and nursing note reviewed.   HENT:      Head: Normocephalic.   Eyes:      General: No " scleral icterus.  Cardiovascular:      Rate and Rhythm: Normal rate. Rhythm irregular.   Pulmonary:      Breath sounds: Wheezing present.      Comments: Sob after full sentence.   + dry cough  4L  Musculoskeletal:      Right lower leg: No edema.      Left lower leg: No edema.   Skin:     General: Skin is warm and dry.   Neurological:      Mental Status: She is alert and oriented to person, place, and time.   Psychiatric:         Mood and Affect: Mood normal.              Labs and Data:      Hematology    Results from last 7 days   Lab Units 01/06/25  0825 01/06/25  0527 01/05/25  0340 01/04/25  1105   WBC K/uL  --  18.61* 12.81* 11.96*   HEMOGLOBIN g/dL  --  13.8 13.7 14.6   HEMATOCRIT %  --  43.3 41.9 45.1*   PLATELETS K/uL  --  252 274 331   INR  2.9  --   --  2.6       Chemistries    Results from last 7 days   Lab Units 01/06/25  0527 01/05/25  0340 01/04/25  1133 01/04/25  1105   SODIUM mEQ/L 128* 132*  --  128*   POTASSIUM mEQ/L 4.4 4.2  --  4.4   CHLORIDE mEQ/L 95* 96*  --  91*   CREATININE mg/dL 0.9 0.9  --  1.0   BUN mg/dL 51* 36*  --  29*   CO2 mEQ/L 27 27  --  21   GLUCOSE mg/dL 242* 193*  --  310*   CALCIUM mg/dL 8.5* 8.5*  --  9.0   MAGNESIUM mg/dL 2.4 1.8 1.7*  --    ALT IU/L  --   --   --  11   AST IU/L  --   --   --  46*       Biomarkers    Results from last 7 days   Lab Units 01/04/25  1719 01/04/25  1330 01/04/25  1105   HIGH SENSITIVE TROPONIN I pg/mL 1,603.8* 1,848.3* 1,645.3*       Cholesterol    Lab Results   Component Value Date    CHOL 100 01/04/2025    TRIG 90 01/04/2025    HDL 36 (L) 01/04/2025    LDLCALC 46 01/04/2025    NONHDLCALC 64 01/04/2025       Endocrine    Lab Results   Component Value Date    HGBA1C 7.5 (H) 01/04/2025      Radiology:  I have independently reviewed the pertinent imaging from the last 24 hrs.    X-RAY CHEST 1 VIEW    Result Date: 1/4/2025  IMPRESSION: Cardiomegaly       Cardiology:    Telemetry  occasional premature ventricular beats, rate controlled atrial  fibrillation       Cardiac Imaging    TRANSTHORACIC ECHO (TTE) COMPLETE 01/06/2025    Interpretation Summary    Left Ventricle: Normal ventricle size. Mild concentric left ventricular hypertrophy. Low normal systolic function. Estimated EF 55%.  A small portion of the the anterolateral wall appears to be markedly hypokinetic.  The interventricular septum appears to move normally.  The remaining LV wall segments appear to contract normally. Diastolic function: patient in a-fib.    Aortic Valve: Tricuspid valve.  Sclerotic leaflets. Mild regurgitation. Mild to moderate stenosis. Peak velocity = 2.57 m/s. Mean gradient = 14.00 mmHg. Calculated area by cont eq = 1.44 cm2. Calculated dimensionless index = 0.46.    Tricuspid Valve: Structure is grossly normal. Mild regurgitation. Estimated RVSP = 63 mmHg.    Pulmonic Valve: Grossly normal structure. Mild regurgitation.    Right Atrium: Mildly perhaps moderately dilated atrium.    Left Atrium: Moderately dilated atrium.    Pericardium: No evidence of pericardial effusion.    Mitral Valve: Sclerotic mitral valve. Normal leaflet motion. Trace regurgitation.    Right Ventricle: Normal ventricle size. Normal systolic function.    Aorta: Aortic root sclerotic. Sinuses of Valsalva sclerotic. Ascending aorta sclerotic.    IVC/SVC: Inferior vena cava is <2.1cm. Inferior vena cava collapses <50% during inspiration.                  ZAIN Goodwin  1/6/2025

## 2025-01-06 NOTE — ASSESSMENT & PLAN NOTE
- Chads- Vasc at least 5. On Warfarin. Known to Daron Avendaño (last office visit noted in 2021)     - Resume Toprol XL 50mg daily - ordered on 1/6   - Can hold off on Digoxin for now given interaction with Azithromycin. Plan to resume once antibiotic course is complete   - Heparin gtt d/c on 1/6 per ACS protocol. Start therapeutic Lovenox. Plan to transition to Warfarin post cardiac cath for goal INR 2.0- 3.0.

## 2025-01-06 NOTE — PROGRESS NOTES
Critical Care Daily Progress Note    LOS: 2  ICU LOS: 2    Subjective: Patient seen and examined.  Notes some improvement since presentation, feeling 30-50% better.  Currently on 4 liters of low flow O2.  Getting a neb and a 2D echo at the time of my evaluation.  No events noted overnight.    Allergies:   Reglan [metoclopramide hcl], Amoxicillin, Bactrim [sulfamethoxazole-trimethoprim], Formaldehyde, and Latex    Medications:  Current Facility-Administered Medications   Medication Dose Route Frequency Provider Last Rate Last Admin    aspirin enteric coated tablet 81 mg  81 mg oral Daily Flaim, Kamaljit A, DO   81 mg at 01/06/25 0820    atorvastatin (LIPITOR) tablet 80 mg  80 mg oral Daily (6p) Flaim, Kamaljit A, DO   80 mg at 01/05/25 1746    azithromycin (ZITHROMAX) IVPB 500 mg in 250 mL NSS vial in bag  500 mg intravenous q24h INT Flaim, Kamaljit A, DO   Stopped at 01/05/25 1555    benzonatate (TESSALON) capsule 100 mg  100 mg oral 3x daily PRN Flaim, Kamaljit A, DO   100 mg at 01/05/25 1156    budesonide (PULMICORT) 0.5 mg/2 mL nebulizer solution 0.5 mg  0.5 mg nebulization BID (8a, 8p) Flaim, Kamaljit A, DO   0.5 mg at 01/06/25 0736    glucose chewable tablet 16-32 g of dextrose  16-32 g of dextrose oral PRN Flaim, Kamaljit A, DO        Or    dextrose 40 % oral gel 15-30 g of dextrose  15-30 g of dextrose oral PRN Flaim, Kamaljit A, DO        Or    glucagon (GLUCAGEN) injection 1 mg  1 mg intramuscular PRN Flaim, Kamaljit A, DO        Or    dextrose 50 % in water (D50) injection 12.5 g  25 mL intravenous PRN Flaim, Kamaljit A, DO        famotidine (PEPCID) tablet 20 mg  20 mg oral Nightly Flaim, Kamaljit A, DO   20 mg at 01/05/25 2234    guaiFENesin (MUCINEX) 12 hr ER tablet 1,200 mg  1,200 mg oral BID Flaim, Kamaljit A, DO   1,200 mg at 01/06/25 0820    heparin (porcine) bolus from bag 2,200-4,350 Units  40-80 Units/kg (Adjusted) intravenous q6h PRN Flor Estevez PA C        heparin infusion in D5W 100 units/mL   "100-4,000 Units/hr intravenous Titrated Flor Estevez PA C   Held at 01/06/25 0653    insulin lispro U-100 (HumaLOG) pen 3-5 Units  3-5 Units subcutaneous Before meals & nightly Flaim, Kamaljit A, DO   3 Units at 01/06/25 0822    ipratropium-albuteroL (DUO-NEB) 0.5-2.5 mg/3 mL nebulizer solution 3 mL  3 mL nebulization q6h MAGAN FlaimTeresitaKamaljit A, DO   3 mL at 01/06/25 0736    methylPREDNISolone sod suc(PF) (SOLU-Medrol) injection 40 mg  40 mg intravenous q8h INT Flaim Kamaljit A, DO   40 mg at 01/06/25 0532    montelukast (SINGULAIR) tablet 10 mg  10 mg oral Nightly Flaim, Kamaljit A, DO   10 mg at 01/05/25 2234    ondansetron (ZOFRAN) injection 4 mg  4 mg intravenous q6h PRN FlaimTeresitaKamaljit A, DO   4 mg at 01/04/25 1554       Vasoactive Agents:  -NONE    Review of Systems:  Review of Systems   Respiratory:  Positive for shortness of breath. Negative for cough and wheezing.    Cardiovascular:  Negative for chest pain.   Gastrointestinal:  Negative for abdominal pain.   All other systems reviewed and are negative.      Objective     Input/Ouput in Last 24 hours:    Intake/Output Summary (Last 24 hours) at 1/6/2025 0845  Last data filed at 1/5/2025 2000  Gross per 24 hour   Intake 488.8 ml   Output 550 ml   Net -61.2 ml       Vital Signs for the last 24 hours:  Visit Vitals  BP (!) 186/78   Pulse 98   Temp 36.8 °C (98.2 °F) (Temporal)   Resp (!) 21   Ht 1.473 m (4' 10\")   Wt 65.8 kg (145 lb)   SpO2 95%   BMI 30.31 kg/m²       Oxygen:  Oxygen Therapy: Supplemental oxygen  O2 Delivery Method: Nasal cannula  O2 Flow Rate (L/min): 4 L/min       Lines:  -Peripheral IV    Physical Exam:  Physical Exam  Vitals and nursing note reviewed.   Constitutional:       General: She is not in acute distress.     Appearance: She is well-developed. She is not ill-appearing.   HENT:      Head: Normocephalic and atraumatic.   Eyes:      General: No scleral icterus.     Pupils: Pupils are equal, round, and reactive to light.   Neck:     "  Trachea: No tracheal deviation.   Cardiovascular:      Rate and Rhythm: Normal rate. Rhythm irregular.      Heart sounds: Normal heart sounds.   Pulmonary:      Effort: Pulmonary effort is normal.      Breath sounds: Wheezing present.   Abdominal:      General: Bowel sounds are normal.      Palpations: Abdomen is soft.      Tenderness: There is no abdominal tenderness.   Musculoskeletal:         General: No deformity.   Skin:     General: Skin is warm and dry.   Neurological:      Mental Status: She is alert and oriented to person, place, and time. Mental status is at baseline.         Labs:      Lab Results   Component Value Date    WBC 18.61 (H) 01/06/2025    HGB 13.8 01/06/2025    HCT 43.3 01/06/2025     01/06/2025    CHOL 100 01/04/2025    TRIG 90 01/04/2025    HDL 36 (L) 01/04/2025    ALT 11 01/04/2025    AST 46 (H) 01/04/2025     (L) 01/06/2025    K 4.4 01/06/2025    CL 95 (L) 01/06/2025    CREATININE 0.9 01/06/2025    BUN 51 (H) 01/06/2025    CO2 27 01/06/2025    INR 2.6 01/04/2025    HGBA1C 7.5 (H) 01/04/2025       Imaging/Radiology:  No new chest imaging available to review      EKG/Telemetry:  atrial fibrillation, with ventricular rate of 86 bpm      Medical ICU IMPRESSION AND PLAN :    Acute respiratory insufficiency w/ hypoxia  -Not oxygen dependent at baseline.  Hypoxic at presentation secondary to RSV infection resulting in an asthma exacerbation  --Chest x-ray from 1/4 w/ clear lung fields  -Sats adequate on 4 liters of low flow O2.  Continue efforts to wean supplemental oxygen as tolerated.  Titrate FiO2 to maintain SpO2 >=90%  --Option of BiPAP 15/8 PRN respiratory distress/increased work of breathing  -Encourage incentive spirometer use while hospitalized  -OOB to chair.  Mobilize as tolerated     Asthma, acutely exacerbated  -Follows w/ Dr. Mcdaniel.  Last seen 11/22/24.  Spirometry at that time w/ FVC 1.07L (57%), FEV1 0.79L (66%), FEV1/FVC 74% c/w small airways disease and  co-existing restriction  --Maintained on high-dose Breo Ellipta, Singulair, and Albuterol PRN  --Started on a Z-krys and a tapering course of Prednisone for an acute flare on 1/2 after telephone consultation w/ Dr. Mcdaniel  -Now w/ severe bronchospasm in the setting of RSV induced asthma exacerbation  -Azithromycin 500mg IV x3 doses  -Budesonide nebs BID -> Discontinue  -Mucinex 1,200mg PO BID  -Duonebs q6 hours while awake  -Singulair 10mg PO qHS  -Solumedrol 40mg IV q8 hours.  Taper as condition allows  -Out-patient follow up w/ Dr. Mcdaniel upon discharge  --Next scheduled appt is 5/9/25 at 1:15pm.  This can be rescheduled as necessary      RSV infection  -PCR positive on 1/4  -Contact and Droplet precautions per guidelines  -Supportive care  -Leukocytosis present on labs, suspected to be secondary to steroid induced demargination    Diabetes mellitus type 2  -Maintained on Januvia out-patient.  Hyperglycemic at presentation, likely due to steroid use  -Monitor Accu-Checks qACHS. POCT ranging 217-253  --Begin Humalog 4 units qAC  ---SSI for additional coverage  -HgB A1C of 7.5% as of 1/4/25     Elevated troponin/NSTEMI  -P/w elevated troponin and ischemic changes on EKG concerning for subendocardial ischemia.  Denies chest pain.  Presentation not c/w ACS  --HST 1,645.3 -> 1,848.3 -> 1,603.8  -Cardiology input appreciated  --Heparin gtt x48 hours  ---Out-patient Coumadin on hold  --Check 2D echocardiogram -> Completed, pending official read  --Eventual cardiac catheterization, timing to be determined  -C/w ASA & Statin therapies  -FLP from 1/4 w/ Chol 100, Tg 90, HDL 36, LDL 46     Hyperlipidemia  -Maintained on Atorvastatin out-patient  --Dosing increased.  Continue while hospitalized  -FLP as outlined above     Atrial fibrillation  -Maintained on Coumadin out-patient.  Rates currently controlled  -On Heparin gtt as outlined above  -Cardiology following  -Monitor telemetry    Hyponatremia  -Na levels of 128 at  presentation, again 128 today  -Free water restrict  -Daily BMP    Hypertension  -Does not appear to have a formal h/o hypertension.  BB has been held since presentation.  BP noted to be elevated per nursing  --BP ranging 147-200/65-81mmHg in the past 24 hours  -Begin Hydralazine 10mg IVP q6 hours PRN SBP >160mmHg  -Cardiology following  --Will discuss an appropriate standing therapy in light of NSTEMI  -Monitor BP     Obesity  -Body mass index is 30.32 kg/m².  -Complicates medical care.  Diet and exercise w/ a goal of weight loss to a more ideal body weight encouraged    Diet: Regular diet, 1800 carb, cardiac  DVT prophylaxis: heparin (therapeutic gtt)  GI prophylaxis: Pepcid    Case d/w: Patient, Nursing, and Anjali (sister at [phone])    Full code.  Maintain ICU level of care for today given tenuous respiratory status    Total critical care time spent on this patient excluding any procedure time = 37 minutes.  Critical Condition, Guarded Prognosis    The case was reviewed this AM at multidisciplinary rounds with the patient's nurse, dietician, pharmacist, respiratory therapist, , , and critical care nurse coordinator. The patient's clinical status with regards to diagnosis and treatment plans including disposition of any IV, arterial lines, mccall, and tubes were discussed as well as dietary, respiratory therapy, and mobilization needs. This process required approximately 10 minutes of coordination of care.

## 2025-01-07 PROBLEM — I35.0 NONRHEUMATIC AORTIC VALVE STENOSIS: Status: ACTIVE | Noted: 2025-01-07

## 2025-01-07 LAB
ANION GAP SERPL CALC-SCNC: 3 MEQ/L (ref 3–15)
BASOPHILS # BLD: 0 K/UL (ref 0.01–0.1)
BASOPHILS NFR BLD: 0 %
BUN SERPL-MCNC: 57 MG/DL (ref 7–25)
BURR CELLS BLD QL SMEAR: ABNORMAL
CALCIUM SERPL-MCNC: 8.2 MG/DL (ref 8.6–10.3)
CHLORIDE SERPL-SCNC: 98 MEQ/L (ref 98–107)
CO2 SERPL-SCNC: 28 MEQ/L (ref 21–31)
CREAT SERPL-MCNC: 0.9 MG/DL (ref 0.6–1.2)
DIFFERENTIAL METHOD BLD: ABNORMAL
EGFRCR SERPLBLD CKD-EPI 2021: >60 ML/MIN/1.73M*2
EOSINOPHIL # BLD: 0 K/UL (ref 0.04–0.36)
EOSINOPHIL NFR BLD: 0 %
ERYTHROCYTE [DISTWIDTH] IN BLOOD BY AUTOMATED COUNT: 13.1 % (ref 11.7–14.4)
GLUCOSE BLD-MCNC: 190 MG/DL (ref 70–99)
GLUCOSE BLD-MCNC: 231 MG/DL (ref 70–99)
GLUCOSE BLD-MCNC: 255 MG/DL (ref 70–99)
GLUCOSE BLD-MCNC: 280 MG/DL (ref 70–99)
GLUCOSE BLD-MCNC: 299 MG/DL (ref 70–99)
GLUCOSE SERPL-MCNC: 224 MG/DL (ref 70–99)
GRAM STN SPEC: NORMAL
HCT VFR BLD AUTO: 37.8 % (ref 35–45)
HGB BLD-MCNC: 12.7 G/DL (ref 11.8–15.7)
LYMPHOCYTES # BLD: 0.95 K/UL (ref 1.2–3.5)
LYMPHOCYTES NFR BLD: 6 %
MAGNESIUM SERPL-MCNC: 2.3 MG/DL (ref 1.8–2.5)
MCH RBC QN AUTO: 31 PG (ref 28–33.2)
MCHC RBC AUTO-ENTMCNC: 33.6 G/DL (ref 32.2–35.5)
MCV RBC AUTO: 92.2 FL (ref 83–98)
MONOCYTES # BLD: 0.16 K/UL (ref 0.28–0.8)
MONOCYTES NFR BLD: 1 %
NEUTS BAND # BLD: 0.32 K/UL (ref 0–0.53)
NEUTS BAND # BLD: 14.39 K/UL (ref 1.7–7)
NEUTS BAND NFR BLD: 2 %
NEUTS SEG NFR BLD: 91 %
PLAT MORPH BLD: NORMAL
PLATELET # BLD AUTO: 232 K/UL (ref 150–369)
PLATELET # BLD EST: ABNORMAL 10*3/UL
PMV BLD AUTO: 10.2 FL (ref 9.4–12.3)
POCT TEST: ABNORMAL
POTASSIUM SERPL-SCNC: 5.2 MEQ/L (ref 3.5–5.1)
QRS DURATION: 84
QT INTERVAL: 312
QTC CALCULATION(BAZETT): 383
R AXIS: 8
RBC # BLD AUTO: 4.1 M/UL (ref 3.93–5.22)
SODIUM SERPL-SCNC: 129 MEQ/L (ref 136–145)
T WAVE AXIS: 196
VENTRICULAR RATE: 91
WBC # BLD AUTO: 15.81 K/UL (ref 3.8–10.5)

## 2025-01-07 PROCEDURE — 94640 AIRWAY INHALATION TREATMENT: CPT

## 2025-01-07 PROCEDURE — 21400000 HC ROOM AND CARE CCU/INTERMEDIATE

## 2025-01-07 PROCEDURE — 63700000 HC SELF-ADMINISTRABLE DRUG: Performed by: INTERNAL MEDICINE

## 2025-01-07 PROCEDURE — 63600000 HC DRUGS/DETAIL CODE: Mod: JZ,TB | Performed by: INTERNAL MEDICINE

## 2025-01-07 PROCEDURE — 99232 SBSQ HOSP IP/OBS MODERATE 35: CPT | Mod: FS | Performed by: INTERNAL MEDICINE

## 2025-01-07 PROCEDURE — 63600000 HC DRUGS/DETAIL CODE: Mod: JZ | Performed by: INTERNAL MEDICINE

## 2025-01-07 PROCEDURE — 99233 SBSQ HOSP IP/OBS HIGH 50: CPT | Performed by: STUDENT IN AN ORGANIZED HEALTH CARE EDUCATION/TRAINING PROGRAM

## 2025-01-07 PROCEDURE — 63700000 HC SELF-ADMINISTRABLE DRUG: Performed by: NURSE PRACTITIONER

## 2025-01-07 PROCEDURE — 93010 ELECTROCARDIOGRAM REPORT: CPT | Performed by: INTERNAL MEDICINE

## 2025-01-07 PROCEDURE — 94660 CPAP INITIATION&MGMT: CPT

## 2025-01-07 PROCEDURE — 63600000 HC DRUGS/DETAIL CODE: Performed by: NURSE PRACTITIONER

## 2025-01-07 PROCEDURE — 63600000 HC DRUGS/DETAIL CODE: Mod: JW | Performed by: INTERNAL MEDICINE

## 2025-01-07 PROCEDURE — 80048 BASIC METABOLIC PNL TOTAL CA: CPT | Performed by: INTERNAL MEDICINE

## 2025-01-07 PROCEDURE — 87070 CULTURE OTHR SPECIMN AEROBIC: CPT | Performed by: INTERNAL MEDICINE

## 2025-01-07 PROCEDURE — 36415 COLL VENOUS BLD VENIPUNCTURE: CPT | Performed by: INTERNAL MEDICINE

## 2025-01-07 PROCEDURE — 85025 COMPLETE CBC W/AUTO DIFF WBC: CPT | Performed by: INTERNAL MEDICINE

## 2025-01-07 PROCEDURE — 83735 ASSAY OF MAGNESIUM: CPT | Performed by: INTERNAL MEDICINE

## 2025-01-07 PROCEDURE — 87205 SMEAR GRAM STAIN: CPT | Performed by: INTERNAL MEDICINE

## 2025-01-07 PROCEDURE — 25000000 HC PHARMACY GENERAL: Performed by: INTERNAL MEDICINE

## 2025-01-07 RX ORDER — AMOXICILLIN 250 MG
1 CAPSULE ORAL NIGHTLY
Status: DISCONTINUED | OUTPATIENT
Start: 2025-01-07 | End: 2025-01-14 | Stop reason: HOSPADM

## 2025-01-07 RX ORDER — POLYETHYLENE GLYCOL 3350 17 G/17G
17 POWDER, FOR SOLUTION ORAL DAILY
Status: DISCONTINUED | OUTPATIENT
Start: 2025-01-07 | End: 2025-01-14 | Stop reason: HOSPADM

## 2025-01-07 RX ORDER — ACETAMINOPHEN 325 MG/1
650 TABLET ORAL EVERY 4 HOURS PRN
Status: DISCONTINUED | OUTPATIENT
Start: 2025-01-07 | End: 2025-01-14 | Stop reason: HOSPADM

## 2025-01-07 RX ORDER — INSULIN LISPRO 100 [IU]/ML
5 INJECTION, SOLUTION INTRAVENOUS; SUBCUTANEOUS
Status: DISCONTINUED | OUTPATIENT
Start: 2025-01-07 | End: 2025-01-14 | Stop reason: HOSPADM

## 2025-01-07 RX ADMIN — SENNOSIDES AND DOCUSATE SODIUM 1 TABLET: 8.6; 5 TABLET ORAL at 21:18

## 2025-01-07 RX ADMIN — FAMOTIDINE 20 MG: 20 TABLET, FILM COATED ORAL at 21:18

## 2025-01-07 RX ADMIN — IPRATROPIUM BROMIDE AND ALBUTEROL SULFATE 3 ML: .5; 3 SOLUTION RESPIRATORY (INHALATION) at 13:56

## 2025-01-07 RX ADMIN — ENOXAPARIN SODIUM 70 MG: 80 INJECTION SUBCUTANEOUS at 17:17

## 2025-01-07 RX ADMIN — INSULIN LISPRO 4 UNITS: 100 INJECTION, SOLUTION INTRAVENOUS; SUBCUTANEOUS at 08:51

## 2025-01-07 RX ADMIN — POLYETHYLENE GLYCOL 3350 17 G: 17 POWDER, FOR SOLUTION ORAL at 12:13

## 2025-01-07 RX ADMIN — GUAIFENESIN 1200 MG: 600 TABLET, EXTENDED RELEASE ORAL at 08:33

## 2025-01-07 RX ADMIN — METHYLPREDNISOLONE SODIUM SUCCINATE 40 MG: 40 INJECTION, POWDER, FOR SOLUTION INTRAMUSCULAR; INTRAVENOUS at 20:15

## 2025-01-07 RX ADMIN — MONTELUKAST 10 MG: 10 TABLET, FILM COATED ORAL at 21:18

## 2025-01-07 RX ADMIN — METHYLPREDNISOLONE SODIUM SUCCINATE 40 MG: 40 INJECTION, POWDER, FOR SOLUTION INTRAMUSCULAR; INTRAVENOUS at 12:13

## 2025-01-07 RX ADMIN — INSULIN LISPRO 5 UNITS: 100 INJECTION, SOLUTION INTRAVENOUS; SUBCUTANEOUS at 17:17

## 2025-01-07 RX ADMIN — INSULIN LISPRO 3 UNITS: 100 INJECTION, SOLUTION INTRAVENOUS; SUBCUTANEOUS at 17:19

## 2025-01-07 RX ADMIN — ATORVASTATIN CALCIUM 80 MG: 80 TABLET, FILM COATED ORAL at 17:16

## 2025-01-07 RX ADMIN — BENZONATATE 100 MG: 100 CAPSULE ORAL at 06:15

## 2025-01-07 RX ADMIN — ASPIRIN 81 MG: 81 TABLET, COATED ORAL at 08:34

## 2025-01-07 RX ADMIN — METOPROLOL SUCCINATE ER TABLETS 50 MG: 50 TABLET, FILM COATED, EXTENDED RELEASE ORAL at 08:34

## 2025-01-07 RX ADMIN — METHYLPREDNISOLONE SODIUM SUCCINATE 40 MG: 40 INJECTION, POWDER, FOR SOLUTION INTRAMUSCULAR; INTRAVENOUS at 05:00

## 2025-01-07 RX ADMIN — IPRATROPIUM BROMIDE AND ALBUTEROL SULFATE 3 ML: .5; 3 SOLUTION RESPIRATORY (INHALATION) at 07:34

## 2025-01-07 RX ADMIN — IPRATROPIUM BROMIDE AND ALBUTEROL SULFATE 3 ML: .5; 3 SOLUTION RESPIRATORY (INHALATION) at 20:15

## 2025-01-07 RX ADMIN — INSULIN LISPRO 3 UNITS: 100 INJECTION, SOLUTION INTRAVENOUS; SUBCUTANEOUS at 08:51

## 2025-01-07 RX ADMIN — HYDRALAZINE HYDROCHLORIDE 10 MG: 20 INJECTION INTRAMUSCULAR; INTRAVENOUS at 06:14

## 2025-01-07 RX ADMIN — ENOXAPARIN SODIUM 70 MG: 80 INJECTION SUBCUTANEOUS at 05:00

## 2025-01-07 RX ADMIN — VALSARTAN 40 MG: 40 TABLET ORAL at 08:34

## 2025-01-07 RX ADMIN — GUAIFENESIN 1200 MG: 600 TABLET, EXTENDED RELEASE ORAL at 20:15

## 2025-01-07 RX ADMIN — INSULIN LISPRO 5 UNITS: 100 INJECTION, SOLUTION INTRAVENOUS; SUBCUTANEOUS at 12:15

## 2025-01-07 RX ADMIN — INSULIN LISPRO 3 UNITS: 100 INJECTION, SOLUTION INTRAVENOUS; SUBCUTANEOUS at 21:18

## 2025-01-07 RX ADMIN — INSULIN LISPRO 5 UNITS: 100 INJECTION, SOLUTION INTRAVENOUS; SUBCUTANEOUS at 12:14

## 2025-01-07 ASSESSMENT — ENCOUNTER SYMPTOMS
COUGH: 1
SHORTNESS OF BREATH: 1
WHEEZING: 1
ABDOMINAL PAIN: 0

## 2025-01-07 NOTE — ASSESSMENT & PLAN NOTE
- BP improved with adjustment in meds     - Continue Toprol XL   - Continue Valsartan 40mg daily   - Can keep IV Hydralazine PRN

## 2025-01-07 NOTE — PROGRESS NOTES
[facility] Medicine     Daily Progress Note       SUBJECTIVE   Interval History: Patient seen and examined.  She states that she is feeling better.  Denies shortness of breath, chest pain.  All other ROS negative.    Downgrade from ICU.  Discussed with Dr. Mcdaniel.     OBJECTIVE      Vital signs in last 24 hours:  Temp:  [36.2 °C (97.1 °F)-37.2 °C (99 °F)] 37.2 °C (99 °F)  Heart Rate:  [62-92] 79  Resp:  [11-35] 21  BP: (115-197)/(58-81) 161/70  FiO2 (%) (Set):  [28 %-40 %] 28 %    Intake/Output Summary (Last 24 hours) at 1/7/2025 1730  Last data filed at 1/7/2025 1328  Gross per 24 hour   Intake 970 ml   Output 1300 ml   Net -330 ml       PHYSICAL EXAMINATION      Physical Exam    General: Awake and alert  Head: Normocephalic, atraumatic  Eyes: EOMI B/L  Lungs: Decreased  Heart: irregular, +LEONIDAS  Extremities: No cyanosis or edema  Neurologic: Grossly nonfocal  Behavior: Appropriate, cooperative        LINES, CATHETERS, DRAINS, AIRWAYS, AND WOUNDS   Lines, Drains, and Airways:  Wounds (agree with documentation and present on admission):  Peripheral IV (Adult) 01/04/25 Left Antecubital (Active)   Number of days: 3       Peripheral IV (Adult) 01/04/25 Anterior;Proximal;Right Forearm (Active)   Number of days: 3       External Urinary Catheter Female (one size) (Active)   Number of days: 3         Comments:    LABS / IMAGING / TELE      Labs  CBC Results         01/07/25 01/06/25 01/05/25     0506 0527 0340    WBC 15.81 18.61 12.81    RBC 4.10 4.67 4.52    HGB 12.7 13.8 13.7    HCT 37.8 43.3 41.9    MCV 92.2 92.7 92.7    MCH 31.0 29.6 30.3    MCHC 33.6 31.9 32.7     252 274           BMP Results         01/07/25 01/06/25 01/05/25     0506 0527 0340     128 132    K 5.2 4.4 4.2    Cl 98 95 96    CO2 28 27 27    Glucose 224 242 193    BUN 57 51 36    Creatinine 0.9 0.9 0.9    Calcium 8.2 8.5 8.5    Anion Gap 3 6 9    EGFR >60.0 >60.0 >60.0           Comment for EGFR at 0506 on 01/07/25: Calculation based on  the Chronic Kidney Disease Epidemiology Collaboration (CKD-EPI) equation refit without adjustment for race.    Comment for EGFR at 0527 on 01/06/25: Calculation based on the Chronic Kidney Disease Epidemiology Collaboration (CKD-EPI) equation refit without adjustment for race.    Comment for EGFR at 0340 on 01/05/25: Calculation based on the Chronic Kidney Disease Epidemiology Collaboration (CKD-EPI) equation refit without adjustment for race.           CMP Results         01/07/25 01/06/25 01/05/25     0506 0527 0340     128 132    K 5.2 4.4 4.2    Cl 98 95 96    CO2 28 27 27    Glucose 224 242 193    BUN 57 51 36    Creatinine 0.9 0.9 0.9    Calcium 8.2 8.5 8.5    Anion Gap 3 6 9    EGFR >60.0 >60.0 >60.0           Comment for EGFR at 0506 on 01/07/25: Calculation based on the Chronic Kidney Disease Epidemiology Collaboration (CKD-EPI) equation refit without adjustment for race.    Comment for EGFR at 0527 on 01/06/25: Calculation based on the Chronic Kidney Disease Epidemiology Collaboration (CKD-EPI) equation refit without adjustment for race.    Comment for EGFR at 0340 on 01/05/25: Calculation based on the Chronic Kidney Disease Epidemiology Collaboration (CKD-EPI) equation refit without adjustment for race.             Imaging  Imaging from last 24 hours reviewed    ECG/Telemetry  Telemetry reviewed.    ASSESSMENT AND PLAN      Acute respiratory insufficiency w/ hypoxia, improving  -Not oxygen dependent at baseline.    2/2 RSV with acute asthma exacerbation  -Currently on 2L  --Option of BiPAP 15/8 PRN respiratory distress/increased work of breathing  -Encourage incentive spirometer use while hospitalized  -OOB to chair  - Pulm following     Asthma, acutely exacerbated   --Maintained on high-dose Breo Ellipta, Singulair, and Albuterol PRN at home  -S/p Azithromycin 500mg IV x3 doses, course completed on 1/6  -Mucinex 1,200mg PO BID  -Duonebs q6 hours while awake  -Singulair 10mg PO qHS  -Solumedrol 40mg  IV q8 hours.  Will transition to q12 hours tomorrow  -Out-patient follow up w/ Dr. Mcdaniel upon discharge      RSV infection  -PCR positive on 1/4  -Supportive care  -Leukocytosis present on labs, suspected to be secondary to steroids     Diabetes mellitus type 2  -Maintained on Januvia out-patient.    Hyperglycemic at presentation, likely due to steroid use PTA  --Humalog 4 units qAC -> Increase to 5 units  ---SSI for additional coverage  -HgB A1C of 7.5% as of 1/4/25     Elevated troponin/NSTEMI  -P/w elevated troponin and ischemic changes on EKG concerning for subendocardial ischemia.  Denies chest pain.  Presentation not c/w ACS  --HST 1,645.3 -> 1,848.3 -> 1,603.8  -Cardiology input appreciated  --S/p Heparin gtt x48 hours.  Now on therapeutic Lovenox for atrial fibrillation  ---Out-patient Coumadin on hold  --Eventual cardiac catheterization, tentatively targeting 1/8  -2D echo from 1/6 w/ mild cLVH and EF 55%.  A small portion of the the anterolateral wall appears to be markedly hypokinetic.  Mild TR.  RVSP = 63 mmHg. Mildly dilated RA, moderately dilated LA  -C/w ASA & Statin therapies     Hyperlipidemia  -Maintained on Atorvastatin out-patient  -FLP from 1/4 w/ Chol 100, Tg 90, HDL 36, LDL 46  --Dosing increased.  Continue while hospitalized     Atrial fibrillation  -Maintained on Coumadin out-patient.  Rates currently controlled  -S/p Heparin gtt as outlined above.  Currently on therapeutic Lovenox  --Coumadin on hold  -Cardiology following     Hyponatremia  -Na levels of 128 at presentation, stable at 129 today  -Free water restriction ordered  -Daily BMP     Hypertension  -Does not appear to have a formal h/o hypertension.  BB had been held at presentation.  BP noted to be elevated on 1/6 per nursing, ranging 147-200/65-81mmHg  -Hydralazine 10mg IVP q6 hours PRN SBP >160mmHg  -Cardiology following  --Toprol XL 50mg pO qDay  --Valsartan 40mg PO qDay  -Monitor BP     Obesity  -Body mass index is 30.32  kg/m².  -Complicates medical care.  Diet and exercise w/ a goal of weight loss to a more ideal body weight encouraged       VTE Assessment: Padua    VTE Prophylaxis:  Current anticoagulants:  enoxaparin (LOVENOX) syringe 70 mg, subcutaneous, q12h INT      Code Status: Full Code      Estimated Discharge Date: 1/10/2025   Disposition Planning: Pending [facility] and clinical improvement     Svitlana Fierro,   1/7/2025

## 2025-01-07 NOTE — PROGRESS NOTES
Critical Care Daily Progress Note    LOS: 3  ICU LOS: 3    Subjective: Patient seen and examined.  No acute complaints offered today.  Still endorses cough which is breaking up some.  Weaned to 2 liters of low flow O2.  Feels less tight.  No events noted overnight per nursing.    Allergies:   Reglan [metoclopramide hcl], Amoxicillin, Bactrim [sulfamethoxazole-trimethoprim], Formaldehyde, and Latex    Medications:  Current Facility-Administered Medications   Medication Dose Route Frequency Provider Last Rate Last Admin    aspirin enteric coated tablet 81 mg  81 mg oral Daily Flaim, Kamaljit A, DO   81 mg at 01/07/25 0834    atorvastatin (LIPITOR) tablet 80 mg  80 mg oral Daily (6p) Flaim, Kamaljit A, DO   80 mg at 01/06/25 1734    benzonatate (TESSALON) capsule 100 mg  100 mg oral 3x daily PRN Flaim, Kamaljit A, DO   100 mg at 01/07/25 0615    glucose chewable tablet 16-32 g of dextrose  16-32 g of dextrose oral PRN Flaim, Kamaljit A, DO        Or    dextrose 40 % oral gel 15-30 g of dextrose  15-30 g of dextrose oral PRN Flaim, Kamaljit A, DO        Or    glucagon (GLUCAGEN) injection 1 mg  1 mg intramuscular PRN Flaim, Kamaljit A, DO        Or    dextrose 50 % in water (D50) injection 12.5 g  25 mL intravenous PRN Flaim, Kamaljit A, DO        enoxaparin (LOVENOX) syringe 70 mg  1 mg/kg subcutaneous q12h INT Bailey Drew CRNP   70 mg at 01/07/25 0500    famotidine (PEPCID) tablet 20 mg  20 mg oral Nightly Flaim, Kamaljit A, DO   20 mg at 01/06/25 2100    guaiFENesin (MUCINEX) 12 hr ER tablet 1,200 mg  1,200 mg oral BID Flaim, Kamaljit A, DO   1,200 mg at 01/07/25 0833    hydrALAZINE (APRESOLINE) injection 10 mg  10 mg intravenous q6h PRN Juwan Mcdaniel, DO   10 mg at 01/07/25 0614    insulin lispro U-100 (HumaLOG) pen 3-5 Units  3-5 Units subcutaneous Before meals & nightly Flaim, Kamaljit A, DO   5 Units at 01/06/25 1224    insulin lispro U-100 (HumaLOG) pen 4 Units  4 Units subcutaneous TID with meals Juwan Mcdaniel,  "DO   4 Units at 01/06/25 1735    ipratropium-albuteroL (DUO-NEB) 0.5-2.5 mg/3 mL nebulizer solution 3 mL  3 mL nebulization q6h while awake Juwan Mcdaniel, DO   3 mL at 01/07/25 0734    methylPREDNISolone sod suc(PF) (SOLU-Medrol) injection 40 mg  40 mg intravenous q8h INT Kamaljit Mccarty, DO   40 mg at 01/07/25 0500    metoprolol succinate XL (TOPROL-XL) 24 hr ER tablet 50 mg  50 mg oral Daily Cilia, Bailey A, CRNP   50 mg at 01/07/25 0834    montelukast (SINGULAIR) tablet 10 mg  10 mg oral Nightly FlTeresita velardeony A, DO   10 mg at 01/06/25 2100    ondansetron (ZOFRAN) injection 4 mg  4 mg intravenous q6h PRN Teresita Mccartyony A, DO   4 mg at 01/04/25 1554    valsartan (DIOVAN) tablet 40 mg  40 mg oral Daily Rajendra, Bailey A, CRNP   40 mg at 01/07/25 0834       Vasoactive Agents:  -NONE    Review of Systems:  Review of Systems   Respiratory:  Positive for cough, shortness of breath and wheezing.    Cardiovascular:  Negative for chest pain.   Gastrointestinal:  Negative for abdominal pain.   All other systems reviewed and are negative.      Objective     Input/Ouput in Last 24 hours:    Intake/Output Summary (Last 24 hours) at 1/7/2025 0848  Last data filed at 1/7/2025 0800  Gross per 24 hour   Intake 867.83 ml   Output 1300 ml   Net -432.17 ml       Vital Signs for the last 24 hours:  Visit Vitals  /64   Pulse 89   Temp 36.8 °C (98.2 °F) (Temporal)   Resp 17   Ht 1.473 m (4' 10\")   Wt 68.4 kg (150 lb 12.7 oz)   SpO2 96%   BMI 31.52 kg/m²       Oxygen:  Oxygen Therapy: Supplemental oxygen  O2 Delivery Method: Nasal cannula humidified  O2 Flow Rate (L/min): 2 L/min     Lines:  -Peripheral IV    Physical Exam:  Physical Exam  Vitals and nursing note reviewed.   Constitutional:       General: She is not in acute distress.     Appearance: She is well-developed.   HENT:      Head: Normocephalic and atraumatic.   Eyes:      General: No scleral icterus.     Pupils: Pupils are equal, round, and reactive to light.   Neck:     "  Trachea: No tracheal deviation.   Cardiovascular:      Rate and Rhythm: Normal rate. Rhythm irregular.      Heart sounds: Normal heart sounds.   Pulmonary:      Effort: Pulmonary effort is normal.      Breath sounds: Wheezing present.   Abdominal:      General: Bowel sounds are normal.      Palpations: Abdomen is soft.      Tenderness: There is no abdominal tenderness.   Musculoskeletal:         General: No deformity.   Skin:     General: Skin is warm and dry.      Findings: No bruising.   Neurological:      Mental Status: She is alert and oriented to person, place, and time. Mental status is at baseline.         Labs:      Lab Results   Component Value Date    WBC 15.81 (H) 01/07/2025    HGB 12.7 01/07/2025    HCT 37.8 01/07/2025     01/07/2025    CHOL 100 01/04/2025    TRIG 90 01/04/2025    HDL 36 (L) 01/04/2025    ALT 11 01/04/2025    AST 46 (H) 01/04/2025     (L) 01/07/2025    K 5.2 (H) 01/07/2025    CL 98 01/07/2025    CREATININE 0.9 01/07/2025    BUN 57 (H) 01/07/2025    CO2 28 01/07/2025    INR 2.9 01/06/2025    HGBA1C 7.5 (H) 01/04/2025       Imaging/Radiology:    2D echo (1/6):  Left Ventricle: Normal ventricle size. Mild concentric left ventricular hypertrophy. Low normal systolic function. Estimated EF 55%.  A small portion of the the anterolateral wall appears to be markedly hypokinetic.  The interventricular septum appears to move normally.  The remaining LV wall segments appear to contract normally. Diastolic function: patient in a-fib. Aortic Valve: Tricuspid valve.  Sclerotic leaflets. Mild regurgitation. Mild to moderate stenosis. Peak velocity = 2.57 m/s. Mean gradient = 14.00 mmHg. Calculated area by cont eq = 1.44 cm2. Calculated dimensionless index = 0.46.  Tricuspid Valve: Structure is grossly normal. Mild regurgitation. Estimated RVSP = 63 mmHg. Pulmonic Valve: Grossly normal structure. Mild regurgitation. Right Atrium: Mildly perhaps moderately dilated atrium. Left Atrium:  Moderately dilated atrium. Pericardium: No evidence of pericardial effusion. Mitral Valve: Sclerotic mitral valve. Normal leaflet motion. Trace regurgitation.  Right Ventricle: Normal ventricle size. Normal systolic function. Aorta: Aortic root sclerotic. Sinuses of Valsalva sclerotic. Ascending aorta sclerotic. IVC/SVC: Inferior vena cava is <2.1cm. Inferior vena cava collapses <50% during inspiration.        EKG/Telemetry:  atrial fibrillation, with ventricular rate of 89 bpm      Medical ICU IMPRESSION AND PLAN :    Acute respiratory insufficiency w/ hypoxia, improving  -Not oxygen dependent at baseline.  Hypoxic at presentation secondary to RSV infection resulting in an asthma exacerbation  --Chest x-ray from 1/4 w/ clear lung fields  -Weaned to 2 liters of low flow O2.  Continue efforts to wean supplemental oxygen as tolerated.  Titrate FiO2 to maintain SpO2 >=90%  --Option of BiPAP 15/8 PRN respiratory distress/increased work of breathing  -Encourage incentive spirometer use while hospitalized  -OOB to chair.  Mobilize as tolerated     Asthma, acutely exacerbated  -Follows w/ Dr. Mcdaniel.  Last seen 11/22/24.  Spirometry at that time w/ FVC 1.07L (57%), FEV1 0.79L (66%), FEV1/FVC 74% c/w small airways disease and co-existing restriction  --Maintained on high-dose Breo Ellipta, Singulair, and Albuterol PRN  --Started on a Z-krys and a tapering course of Prednisone for an acute flare on 1/2 after telephone consultation w/ Dr. Mcdaniel  -Now w/ severe bronchospasm in the setting of RSV induced asthma exacerbation  -S/p Azithromycin 500mg IV x3 doses, course completing on 1/6  -Mucinex 1,200mg PO BID  -Duonebs q6 hours while awake -> Continue today  -Singulair 10mg PO qHS  -Solumedrol 40mg IV q8 hours.  Will transition to q12 hours tomorrow  -Out-patient follow up w/ Dr. Mcdnaiel upon discharge  --Next scheduled appt is 5/9/25 at 1:15pm.  This can be rescheduled as necessary      RSV infection  -PCR positive on  1/4  -Contact and Droplet precautions per guidelines  -Supportive care  -Leukocytosis present on labs, suspected to be secondary to steroid induced demargination  --WBC: 12.8 -> 18.6 -> 15.8K     Diabetes mellitus type 2  -Maintained on Januvia out-patient.  Hyperglycemic at presentation, likely due to steroid use  -Monitor Accu-Checks qACHS. POCT ranging 150-231  --Humalog 4 units qAC -> Increase to 5 units  ---SSI for additional coverage  -HgB A1C of 7.5% as of 1/4/25     Elevated troponin/NSTEMI  -P/w elevated troponin and ischemic changes on EKG concerning for subendocardial ischemia.  Denies chest pain.  Presentation not c/w ACS  --HST 1,645.3 -> 1,848.3 -> 1,603.8  -Cardiology input appreciated  --S/p Heparin gtt x48 hours.  Now on therapeutic Lovenox for atrial fibrillation  ---Out-patient Coumadin on hold  --Eventual cardiac catheterization, tentatively targeting 1/8  -2D echo from 1/6 w/ mild cLVH and EF 55%.  A small portion of the the anterolateral wall appears to be markedly hypokinetic.  Mild TR.  RVSP = 63 mmHg. Mildly dilated RA, moderately dilated LA  -C/w ASA & Statin therapies     Hyperlipidemia  -Maintained on Atorvastatin out-patient  -FLP from 1/4 w/ Chol 100, Tg 90, HDL 36, LDL 46  --Dosing increased.  Continue while hospitalized     Atrial fibrillation  -Maintained on Coumadin out-patient.  Rates currently controlled  -S/p Heparin gtt as outlined above.  Currently on therapeutic Lovenox  --Coumadin on hold  -Cardiology following  -Monitor telemetry     Hyponatremia  -Na levels of 128 at presentation, stable at 129 today  -Free water restriction ordered  -Daily BMP     Hypertension  -Does not appear to have a formal h/o hypertension.  BB had been held at presentation.  BP noted to be elevated on 1/6 per nursing, ranging 147-200/65-81mmHg  -Hydralazine 10mg IVP q6 hours PRN SBP >160mmHg  -Cardiology following  --Toprol XL 50mg pO qDay  --Valsartan 40mg PO qDay  -Monitor BP     Obesity  -Body  mass index is 30.32 kg/m².  -Complicates medical care.  Diet and exercise w/ a goal of weight loss to a more ideal body weight encouraged     Diet: Regular diet, 1800 carb, cardiac, 1,500mL fluid  DVT prophylaxis: Lovenox (therapeutic dosing)  GI prophylaxis: Pepcid     Case d/w: Patient, Nursing, Bailey Drew, and Anjali (sister at [phone])     Full code.  Stable for downgrade from ICU level of care.  Recommend telemetry.  Will transfer to the service of [facility]     The case was reviewed this AM at multidisciplinary rounds with the patient's nurse, dietician, pharmacist, respiratory therapist, , , and critical care nurse coordinator. The patient's clinical status with regards to diagnosis and treatment plans including disposition of any IV, arterial lines, mccall, and tubes were discussed as well as dietary, respiratory therapy, and mobilization needs. This process required approximately 10 minutes of coordination of care.

## 2025-01-07 NOTE — ASSESSMENT & PLAN NOTE
- Negative Lexiscan in 2021  - HST peak of 1800 with ST/T wave abnormalities on admission EKG (new compared to prior 2018 tracings in epic). Denies any obvious s/s of ACS (SOB/wheezing/cough with RSV)   - Echo 1/6/25: Estimated EF 55%. A small portion of the the anterolateral wall appears to be markedly hypokinetic. (EF 60-65% in March 2023 with no noted RWMA)     - Continue aspirin and high intensity statin  - Continue Toprol XL   - Added Valsartan on 1/6 for BP management. Continue at this time  - For possible LHC in the next 24 hours if her pulmonary status continues to improve. Now on 2L nasal canula with improvement in symptoms.

## 2025-01-07 NOTE — PROGRESS NOTES
Critical Care Addendum     Transfer note (ICU -> Floor)     Mrs. Monzon presented to [place] on 1/4 w/ findings of a severe asthma exacerbation in the setting of RSV infection.  Being treated w/ steroids and nebs and slowly improving.  Weaned to 2 liters of low flow O2.  Course complicated by NSTEMI.  Cardiology following.  Tentatively planned for [facility] on 1/8.  Remainder of chronic issues largely at baseline.  On a free water restriction for hyponatremia.  Tolerating a diet.  Stable for telemetry level of care.  Will continue to follow from a pulmonary perspective.     Please see today's critical care note for additional details.     Case d/w Dr. Joy who accepts patient to the [facility] service on behalf of Dr. Fierro.  Hand off provided.

## 2025-01-07 NOTE — PLAN OF CARE
Plan of Care Review  Outcome Evaluation: Pt  receivedAA$x4.  Afib. No Pills whole in applesauce.  2LNC humidified. Sputum sent. Bed  rest.  Blood sugars checked and  covered with  insulin. Bed alarm on.

## 2025-01-07 NOTE — ASSESSMENT & PLAN NOTE
- Echo 1/6/25: Aortic Valve: Tricuspid valve. Sclerotic leaflets. Mild regurgitation. Mild to moderate stenosis. Peak velocity = 2.57 m/s. Mean gradient = 14.00 mmHg. Calculated area by cont eq = 1.44 cm2. Calculated dimensionless index = 0.46. (Mild AS on Echo March 2023 care everywhere)     - Continue routine surveillance

## 2025-01-07 NOTE — PROGRESS NOTES
Inpatient Cardiology   Daily Progress Note       Overview:     - 80 y/o, PMH of afib on Warfarin, asthma/COPD, HTN, DM2 who presented on 1/4 with increasing shortness of breath despite outpatient steroids and Z-krys on 1/2. Admitted for RSV and asthma exacerbation. Cardiology consulted for elevated troponin.   - Transfer to telemetry on 1/7   - For LHC this admission        Assessment/Plan   Assessment & Plan  NSTEMI (non-ST elevated myocardial infarction) (CMS/[facility])  - Negative Lexiscan in 2021  - HST peak of 1800 with ST/T wave abnormalities on admission EKG (new compared to prior 2018 tracings in epic). Denies any obvious s/s of ACS (SOB/wheezing/cough with RSV)   - Echo 1/6/25: Estimated EF 55%. A small portion of the the anterolateral wall appears to be markedly hypokinetic. (EF 60-65% in March 2023 with no noted RWMA)     - Continue aspirin and high intensity statin  - Continue Toprol XL   - Added Valsartan on 1/6 for BP management. Continue at this time  - For possible LHC in the next 24 hours if her pulmonary status continues to improve. Now on 2L nasal canula with improvement in symptoms.     A-fib (CMS/[facility])  - Chads- Vasc at least 5. On Warfarin. Known to Daron Avendaño (last office visit noted in 2021)     - Continue Toprol XL 50mg daily - ordered on 1/6   - Can hold off on Digoxin for now given interaction with Azithromycin. Plan to resume once antibiotic course is complete   - Heparin gtt d/c on 1/6 per ACS protocol. Continue therapeutic Lovenox. Plan to transition to Warfarin post cardiac cath for goal INR 2.0- 3.0.   Nonrheumatic aortic valve stenosis  - Echo 1/6/25: Aortic Valve: Tricuspid valve. Sclerotic leaflets. Mild regurgitation. Mild to moderate stenosis. Peak velocity = 2.57 m/s. Mean gradient = 14.00 mmHg. Calculated area by cont eq = 1.44 cm2. Calculated dimensionless index = 0.46. (Mild AS on Echo March 2023 care everywhere)     - Continue routine surveillance   Essential  hypertension, benign  - BP improved with adjustment in meds     - Continue Toprol XL   - Continue Valsartan 40mg daily   - Can keep IV Hydralazine PRN   Obstructive chronic bronchitis with exacerbation (CMS/[facility])  - Known to Dr. Mcdaniel as outpatient    - Supplemental 02/bi-pap as respiratory status requires   - Nebs/ steroids / inhalers / antibiotics  - Pulmonary is following  Dyslipidemia  - Continue statin   DM (diabetes mellitus) (CMS/[facility])  - Glucose management per medicine   - Statin/ARB as outlined  RSV (respiratory syncytial virus infection)  - Management per pulm         Subjective   Eating breakfast. Feeling better today. Coughing has improved and she is able to speak for longer period of time. Remains agreeable to cath when able. D/W nursing and critical care team - for downgrade today       Current Medications:    Scheduled:   aspirin  81 mg oral Daily    atorvastatin  80 mg oral Daily (6p)    enoxaparin  1 mg/kg subcutaneous q12h INT    famotidine  20 mg oral Nightly    guaiFENesin  1,200 mg oral BID    insulin lispro U-100  3-5 Units subcutaneous Before meals & nightly    insulin lispro U-100  5 Units subcutaneous TID with meals    ipratropium-albuteroL  3 mL nebulization q6h while awake    methylPREDNISolone sodium succinate  40 mg intravenous q8h INT    metoprolol succinate XL  50 mg oral Daily    montelukast  10 mg oral Nightly    valsartan  40 mg oral Daily       Infusions:      PRN:    benzonatate    glucose **OR** dextrose **OR** glucagon **OR** dextrose 50 % in water (D50)    hydrALAZINE    ondansetron     Objective   Vital signs in last 24 hours:  Temp:  [36.2 °C (97.1 °F)-37.1 °C (98.7 °F)] 36.9 °C (98.4 °F)  Heart Rate:  [62-99] 92  Resp:  [11-35] 11  BP: (115-197)/() 134/65  FiO2 (%) (Set):  [28 %-40 %] 28 %    Weights (last 5 days)       Date/Time Weight    01/07/25 0501 68.4 kg (150 lb 12.7 oz)    01/06/25 0844 65.8 kg (145 lb)    01/06/25 0539 66.1 kg (145 lb 11.6 oz)    01/05/25 1038  65.6 kg (144 lb 10 oz)    01/05/25 0537 65.8 kg (145 lb 1 oz)    01/04/25 1056 65.8 kg (145 lb 1.6 oz)            Intake/Output Summary (Last 24 hours) at 1/7/2025 0659  Last data filed at 1/7/2025 0515  24 Hour Net Input/Output from 7AM Yesterday   Intake 857.83 ml   Output 1300 ml   Net -442.17 ml     Net IO Since Admission: 169.09 mL [01/07/25 1014]    Physical Exam  Vitals and nursing note reviewed.   HENT:      Head: Normocephalic.      Mouth/Throat:      Mouth: Mucous membranes are moist.   Eyes:      General: No scleral icterus.  Cardiovascular:      Rate and Rhythm: Normal rate. Rhythm irregular.      Heart sounds: Murmur (II/VI LEONIDAS at the LSB) heard.   Pulmonary:      Effort: Pulmonary effort is normal.      Breath sounds: Wheezing present.      Comments:   + dry cough  2L  Musculoskeletal:      Right lower leg: No edema.      Left lower leg: No edema.   Skin:     General: Skin is warm and dry.   Neurological:      Mental Status: She is alert and oriented to person, place, and time.   Psychiatric:         Mood and Affect: Mood normal.            Labs and Data:      Hematology    Results from last 7 days   Lab Units 01/07/25  0506 01/06/25  0825 01/06/25  0527 01/05/25  0340 01/04/25  1105   WBC K/uL 15.81*  --  18.61* 12.81* 11.96*   HEMOGLOBIN g/dL 12.7  --  13.8 13.7 14.6   HEMATOCRIT % 37.8  --  43.3 41.9 45.1*   PLATELETS K/uL 232  --  252 274 331   INR   --  2.9  --   --  2.6       Chemistries    Results from last 7 days   Lab Units 01/07/25  0506 01/06/25  0527 01/05/25  0340 01/04/25  1133 01/04/25  1105   SODIUM mEQ/L 129* 128* 132*  --  128*   POTASSIUM mEQ/L 5.2* 4.4 4.2  --  4.4   CHLORIDE mEQ/L 98 95* 96*  --  91*   CREATININE mg/dL 0.9 0.9 0.9  --  1.0   BUN mg/dL 57* 51* 36*  --  29*   CO2 mEQ/L 28 27 27  --  21   GLUCOSE mg/dL 224* 242* 193*  --  310*   CALCIUM mg/dL 8.2* 8.5* 8.5*  --  9.0   MAGNESIUM mg/dL 2.3 2.4 1.8   < >  --    ALT IU/L  --   --   --   --  11   AST IU/L  --   --   --    --  46*    < > = values in this interval not displayed.       Biomarkers    Results from last 7 days   Lab Units 01/04/25  1719 01/04/25  1330 01/04/25  1105   HIGH SENSITIVE TROPONIN I pg/mL 1,603.8* 1,848.3* 1,645.3*       Cholesterol    Lab Results   Component Value Date    CHOL 100 01/04/2025    TRIG 90 01/04/2025    HDL 36 (L) 01/04/2025    LDLCALC 46 01/04/2025    NONHDLCALC 64 01/04/2025       Endocrine    Lab Results   Component Value Date    HGBA1C 7.5 (H) 01/04/2025      Radiology:  I have independently reviewed the pertinent imaging from the last 24 hrs.    X-RAY CHEST 1 VIEW    Result Date: 1/4/2025  IMPRESSION: Cardiomegaly       Cardiology:    Telemetry  rate controlled atrial fibrillation, PVC, 3-4 beat NSVT         Cardiac Imaging    TRANSTHORACIC ECHO (TTE) COMPLETE 01/06/2025    Interpretation Summary    Left Ventricle: Normal ventricle size. Mild concentric left ventricular hypertrophy. Low normal systolic function. Estimated EF 55%.  A small portion of the the anterolateral wall appears to be markedly hypokinetic.  The interventricular septum appears to move normally.  The remaining LV wall segments appear to contract normally. Diastolic function: patient in a-fib.    Aortic Valve: Tricuspid valve.  Sclerotic leaflets. Mild regurgitation. Mild to moderate stenosis. Peak velocity = 2.57 m/s. Mean gradient = 14.00 mmHg. Calculated area by cont eq = 1.44 cm2. Calculated dimensionless index = 0.46.    Tricuspid Valve: Structure is grossly normal. Mild regurgitation. Estimated RVSP = 63 mmHg.    Pulmonic Valve: Grossly normal structure. Mild regurgitation.    Right Atrium: Mildly perhaps moderately dilated atrium.    Left Atrium: Moderately dilated atrium.    Pericardium: No evidence of pericardial effusion.    Mitral Valve: Sclerotic mitral valve. Normal leaflet motion. Trace regurgitation.    Right Ventricle: Normal ventricle size. Normal systolic function.    Aorta: Aortic root sclerotic. Sinuses  of Valsalva sclerotic. Ascending aorta sclerotic.    IVC/SVC: Inferior vena cava is <2.1cm. Inferior vena cava collapses <50% during inspiration.                  ZAIN Goodwin  1/7/2025

## 2025-01-07 NOTE — ASSESSMENT & PLAN NOTE
- Chads- Vasc at least 5. On Warfarin. Known to Daron Avendaño (last office visit noted in 2021)     - Continue Toprol XL 50mg daily - ordered on 1/6   - Can hold off on Digoxin for now given interaction with Azithromycin. Plan to resume once antibiotic course is complete   - Heparin gtt d/c on 1/6 per ACS protocol. Continue therapeutic Lovenox. Plan to transition to Warfarin post cardiac cath for goal INR 2.0- 3.0.

## 2025-01-08 LAB
ANION GAP SERPL CALC-SCNC: 4 MEQ/L (ref 3–15)
BASOPHILS # BLD: 0 K/UL (ref 0.01–0.1)
BASOPHILS NFR BLD: 0 %
BUN SERPL-MCNC: 54 MG/DL (ref 7–25)
BURR CELLS BLD QL SMEAR: ABNORMAL
CALCIUM SERPL-MCNC: 8 MG/DL (ref 8.6–10.3)
CHLORIDE SERPL-SCNC: 103 MEQ/L (ref 98–107)
CO2 SERPL-SCNC: 28 MEQ/L (ref 21–31)
CREAT SERPL-MCNC: 0.9 MG/DL (ref 0.6–1.2)
DIFFERENTIAL METHOD BLD: ABNORMAL
EGFRCR SERPLBLD CKD-EPI 2021: >60 ML/MIN/1.73M*2
EOSINOPHIL # BLD: 0 K/UL (ref 0.04–0.36)
EOSINOPHIL NFR BLD: 0 %
ERYTHROCYTE [DISTWIDTH] IN BLOOD BY AUTOMATED COUNT: 13.2 % (ref 11.7–14.4)
GLUCOSE BLD-MCNC: 129 MG/DL (ref 70–99)
GLUCOSE BLD-MCNC: 185 MG/DL (ref 70–99)
GLUCOSE BLD-MCNC: 224 MG/DL (ref 70–99)
GLUCOSE BLD-MCNC: 264 MG/DL (ref 70–99)
GLUCOSE SERPL-MCNC: 187 MG/DL (ref 70–99)
HCT VFR BLD AUTO: 41.1 % (ref 35–45)
HGB BLD-MCNC: 13.4 G/DL (ref 11.8–15.7)
INR PPP: 1.9
LYMPHOCYTES # BLD: 1.26 K/UL (ref 1.2–3.5)
LYMPHOCYTES NFR BLD: 7 %
MAGNESIUM SERPL-MCNC: 2.2 MG/DL (ref 1.8–2.5)
MCH RBC QN AUTO: 30.5 PG (ref 28–33.2)
MCHC RBC AUTO-ENTMCNC: 32.6 G/DL (ref 32.2–35.5)
MCV RBC AUTO: 93.4 FL (ref 83–98)
MONOCYTES # BLD: 0.54 K/UL (ref 0.28–0.8)
MONOCYTES NFR BLD: 3 %
NEUTS BAND # BLD: 0.36 K/UL (ref 0–0.53)
NEUTS BAND # BLD: 15.82 K/UL (ref 1.7–7)
NEUTS BAND NFR BLD: 2 %
NEUTS SEG NFR BLD: 88 %
OVALOCYTES BLD QL SMEAR: ABNORMAL
PLAT MORPH BLD: NORMAL
PLATELET # BLD AUTO: 276 K/UL (ref 150–369)
PLATELET # BLD EST: ABNORMAL 10*3/UL
PMV BLD AUTO: 10.1 FL (ref 9.4–12.3)
POCT TEST: ABNORMAL
POTASSIUM SERPL-SCNC: 5.2 MEQ/L (ref 3.5–5.1)
PROTHROMBIN TIME: 21.7 SEC (ref 12.2–14.5)
RBC # BLD AUTO: 4.4 M/UL (ref 3.93–5.22)
SODIUM SERPL-SCNC: 135 MEQ/L (ref 136–145)
WBC # BLD AUTO: 17.98 K/UL (ref 3.8–10.5)

## 2025-01-08 PROCEDURE — 63600000 HC DRUGS/DETAIL CODE: Mod: JZ,TB | Performed by: INTERNAL MEDICINE

## 2025-01-08 PROCEDURE — 63700000 HC SELF-ADMINISTRABLE DRUG: Performed by: INTERNAL MEDICINE

## 2025-01-08 PROCEDURE — 63600105 HC IODINE BASED CONTRAST: Performed by: INTERNAL MEDICINE

## 2025-01-08 PROCEDURE — 4A023N7 MEASUREMENT OF CARDIAC SAMPLING AND PRESSURE, LEFT HEART, PERCUTANEOUS APPROACH: ICD-10-PCS | Performed by: INTERNAL MEDICINE

## 2025-01-08 PROCEDURE — 63600000 HC DRUGS/DETAIL CODE: Mod: JZ | Performed by: INTERNAL MEDICINE

## 2025-01-08 PROCEDURE — 99233 SBSQ HOSP IP/OBS HIGH 50: CPT | Mod: FS,25 | Performed by: INTERNAL MEDICINE

## 2025-01-08 PROCEDURE — 94640 AIRWAY INHALATION TREATMENT: CPT

## 2025-01-08 PROCEDURE — 63600000 HC DRUGS/DETAIL CODE: Performed by: INTERNAL MEDICINE

## 2025-01-08 PROCEDURE — 93458 L HRT ARTERY/VENTRICLE ANGIO: CPT | Performed by: INTERNAL MEDICINE

## 2025-01-08 PROCEDURE — B2111ZZ FLUOROSCOPY OF MULTIPLE CORONARY ARTERIES USING LOW OSMOLAR CONTRAST: ICD-10-PCS | Performed by: INTERNAL MEDICINE

## 2025-01-08 PROCEDURE — 80048 BASIC METABOLIC PNL TOTAL CA: CPT | Performed by: INTERNAL MEDICINE

## 2025-01-08 PROCEDURE — C1887 CATHETER, GUIDING: HCPCS | Performed by: INTERNAL MEDICINE

## 2025-01-08 PROCEDURE — 25000000 HC PHARMACY GENERAL: Performed by: INTERNAL MEDICINE

## 2025-01-08 PROCEDURE — 99233 SBSQ HOSP IP/OBS HIGH 50: CPT | Performed by: STUDENT IN AN ORGANIZED HEALTH CARE EDUCATION/TRAINING PROGRAM

## 2025-01-08 PROCEDURE — 36415 COLL VENOUS BLD VENIPUNCTURE: CPT | Performed by: STUDENT IN AN ORGANIZED HEALTH CARE EDUCATION/TRAINING PROGRAM

## 2025-01-08 PROCEDURE — C1894 INTRO/SHEATH, NON-LASER: HCPCS | Performed by: INTERNAL MEDICINE

## 2025-01-08 PROCEDURE — 63700000 HC SELF-ADMINISTRABLE DRUG: Performed by: NURSE PRACTITIONER

## 2025-01-08 PROCEDURE — 83735 ASSAY OF MAGNESIUM: CPT | Performed by: INTERNAL MEDICINE

## 2025-01-08 PROCEDURE — 85025 COMPLETE CBC W/AUTO DIFF WBC: CPT | Performed by: INTERNAL MEDICINE

## 2025-01-08 PROCEDURE — 93458 L HRT ARTERY/VENTRICLE ANGIO: CPT | Mod: 26 | Performed by: INTERNAL MEDICINE

## 2025-01-08 PROCEDURE — 27200000 HC STERILE SUPPLY: Performed by: INTERNAL MEDICINE

## 2025-01-08 PROCEDURE — C1769 GUIDE WIRE: HCPCS | Performed by: INTERNAL MEDICINE

## 2025-01-08 PROCEDURE — 21400000 HC ROOM AND CARE CCU/INTERMEDIATE

## 2025-01-08 PROCEDURE — 85610 PROTHROMBIN TIME: CPT | Performed by: STUDENT IN AN ORGANIZED HEALTH CARE EDUCATION/TRAINING PROGRAM

## 2025-01-08 RX ORDER — NAPROXEN SODIUM 220 MG/1
TABLET, FILM COATED ORAL
Status: DISCONTINUED | OUTPATIENT
Start: 2025-01-08 | End: 2025-01-08 | Stop reason: HOSPADM

## 2025-01-08 RX ORDER — IOPAMIDOL 755 MG/ML
INJECTION, SOLUTION INTRAVASCULAR
Status: DISCONTINUED | OUTPATIENT
Start: 2025-01-08 | End: 2025-01-08 | Stop reason: HOSPADM

## 2025-01-08 RX ORDER — LIDOCAINE HYDROCHLORIDE 10 MG/ML
INJECTION, SOLUTION INFILTRATION; PERINEURAL
Status: DISCONTINUED | OUTPATIENT
Start: 2025-01-08 | End: 2025-01-08 | Stop reason: HOSPADM

## 2025-01-08 RX ORDER — NICARDIPINE HCL-0.9% SOD CHLOR 1 MG/10 ML
SYRINGE (ML) INTRAVENOUS
Status: DISCONTINUED | OUTPATIENT
Start: 2025-01-08 | End: 2025-01-08 | Stop reason: HOSPADM

## 2025-01-08 RX ORDER — HEPARIN SODIUM 1000 [USP'U]/ML
INJECTION, SOLUTION INTRAVENOUS; SUBCUTANEOUS
Status: DISCONTINUED | OUTPATIENT
Start: 2025-01-08 | End: 2025-01-08 | Stop reason: HOSPADM

## 2025-01-08 RX ORDER — VALSARTAN 40 MG/1
40 TABLET ORAL EVERY 12 HOURS
Status: DISCONTINUED | OUTPATIENT
Start: 2025-01-08 | End: 2025-01-10

## 2025-01-08 RX ADMIN — INSULIN LISPRO 5 UNITS: 100 INJECTION, SOLUTION INTRAVENOUS; SUBCUTANEOUS at 16:34

## 2025-01-08 RX ADMIN — GUAIFENESIN 1200 MG: 600 TABLET, EXTENDED RELEASE ORAL at 09:14

## 2025-01-08 RX ADMIN — INSULIN LISPRO 5 UNITS: 100 INJECTION, SOLUTION INTRAVENOUS; SUBCUTANEOUS at 11:20

## 2025-01-08 RX ADMIN — MONTELUKAST 10 MG: 10 TABLET, FILM COATED ORAL at 21:01

## 2025-01-08 RX ADMIN — METOPROLOL SUCCINATE ER TABLETS 50 MG: 50 TABLET, FILM COATED, EXTENDED RELEASE ORAL at 09:14

## 2025-01-08 RX ADMIN — HYDRALAZINE HYDROCHLORIDE 10 MG: 20 INJECTION INTRAMUSCULAR; INTRAVENOUS at 04:07

## 2025-01-08 RX ADMIN — ENOXAPARIN SODIUM 70 MG: 80 INJECTION SUBCUTANEOUS at 06:06

## 2025-01-08 RX ADMIN — METHYLPREDNISOLONE SODIUM SUCCINATE 40 MG: 40 INJECTION, POWDER, FOR SOLUTION INTRAMUSCULAR; INTRAVENOUS at 03:09

## 2025-01-08 RX ADMIN — IPRATROPIUM BROMIDE AND ALBUTEROL SULFATE 3 ML: .5; 3 SOLUTION RESPIRATORY (INHALATION) at 19:56

## 2025-01-08 RX ADMIN — WARFARIN SODIUM 3.5 MG: 5 TABLET ORAL at 17:22

## 2025-01-08 RX ADMIN — IPRATROPIUM BROMIDE AND ALBUTEROL SULFATE 3 ML: .5; 3 SOLUTION RESPIRATORY (INHALATION) at 13:50

## 2025-01-08 RX ADMIN — IPRATROPIUM BROMIDE AND ALBUTEROL SULFATE 3 ML: .5; 3 SOLUTION RESPIRATORY (INHALATION) at 07:33

## 2025-01-08 RX ADMIN — ATORVASTATIN CALCIUM 80 MG: 80 TABLET, FILM COATED ORAL at 17:22

## 2025-01-08 RX ADMIN — METHYLPREDNISOLONE SODIUM SUCCINATE 40 MG: 40 INJECTION, POWDER, FOR SOLUTION INTRAMUSCULAR; INTRAVENOUS at 16:32

## 2025-01-08 RX ADMIN — INSULIN LISPRO 3 UNITS: 100 INJECTION, SOLUTION INTRAVENOUS; SUBCUTANEOUS at 11:21

## 2025-01-08 RX ADMIN — ASPIRIN 81 MG: 81 TABLET, COATED ORAL at 09:14

## 2025-01-08 RX ADMIN — SENNOSIDES AND DOCUSATE SODIUM 1 TABLET: 8.6; 5 TABLET ORAL at 21:01

## 2025-01-08 RX ADMIN — GUAIFENESIN 1200 MG: 600 TABLET, EXTENDED RELEASE ORAL at 21:01

## 2025-01-08 RX ADMIN — INSULIN LISPRO 5 UNITS: 100 INJECTION, SOLUTION INTRAVENOUS; SUBCUTANEOUS at 09:19

## 2025-01-08 RX ADMIN — VALSARTAN 40 MG: 40 TABLET ORAL at 21:01

## 2025-01-08 RX ADMIN — INSULIN LISPRO 3 UNITS: 100 INJECTION, SOLUTION INTRAVENOUS; SUBCUTANEOUS at 16:34

## 2025-01-08 RX ADMIN — VALSARTAN 40 MG: 40 TABLET ORAL at 09:15

## 2025-01-08 RX ADMIN — FAMOTIDINE 20 MG: 20 TABLET, FILM COATED ORAL at 21:02

## 2025-01-08 RX ADMIN — INSULIN LISPRO 3 UNITS: 100 INJECTION, SOLUTION INTRAVENOUS; SUBCUTANEOUS at 09:19

## 2025-01-08 ASSESSMENT — ENCOUNTER SYMPTOMS
ABDOMINAL PAIN: 0
SHORTNESS OF BREATH: 1
WHEEZING: 0
COUGH: 1

## 2025-01-08 ASSESSMENT — COGNITIVE AND FUNCTIONAL STATUS - GENERAL
STANDING UP FROM CHAIR USING ARMS: 2 - A LOT
STANDING UP FROM CHAIR USING ARMS: 2 - A LOT
MOVING TO AND FROM BED TO CHAIR: 2 - A LOT
CLIMB 3 TO 5 STEPS WITH RAILING: 2 - A LOT
WALKING IN HOSPITAL ROOM: 2 - A LOT
MOVING TO AND FROM BED TO CHAIR: 2 - A LOT
WALKING IN HOSPITAL ROOM: 2 - A LOT
CLIMB 3 TO 5 STEPS WITH RAILING: 2 - A LOT

## 2025-01-08 NOTE — ASSESSMENT & PLAN NOTE
- Negative Lexiscan in 2021  - HST peak of 1800 with ST/T wave abnormalities on admission EKG (new compared to prior 2018 tracings in epic). Denies any obvious s/s of ACS (SOB/wheezing/cough with RSV)   - Echo 1/6/25: Estimated EF 55%. A small portion of the the anterolateral wall appears to be markedly hypokinetic. (EF 60-65% in March 2023 with no noted RWMA)   - C on 1/8 with patent coronaries  - Elevated trop likely acute myocardial injury in setting of hypoxic respiratory failure     - Continue aspirin and high intensity statin  - Continue Toprol XL and Valsartan  - No further cardiovascular evaluation is indicated at this time.

## 2025-01-08 NOTE — ASSESSMENT & PLAN NOTE
- Chads- Vasc at least 5. On Warfarin. Known to Daron Avendaño (last office visit noted in 2021)     - Continue Toprol XL 50mg daily  - Resume Digoxin once Azithromycin course is complete.   - Can d/c therapeutic Lovenox. Resume Warfarin for goal INR 2.0 - 3.0

## 2025-01-08 NOTE — PROGRESS NOTES
Patient: Ev Monzon  Location: [facility] Intensive Care Unit 2601  MRN:  [id number]  Today's date:  1/8/2025    Attempted to see patient for therapy. Unable due to medical hold (Per RN pt just came back from cath lab. Pt cannot use RUE for 4 hours, OT will hold and follow up when RUE restriction is lifted).

## 2025-01-08 NOTE — PROGRESS NOTES
Patient: Ev Monzon  Location: [facility] Intensive Care Unit 2601  MRN:  [id number]  Today's date:  1/8/2025    Attempted to see patient for therapy. Unable due to medical hold (Pt bleeding from catheterization site with RN reporting that pt on blood thinners and so OT should hold. OT will follow up as medically appropriate.).

## 2025-01-08 NOTE — PRE-PROCEDURE NOTE
Cardiac Cath Lab Pre-procedure Note    - Patient was seen and examined at bedside.  - The patient's chart and all data was reviewed.  - The procedure, treatment alternatives, risks and benefits were explained with specific risks discussed.  - Patient was consented for cardiac cath procedure and possible PCI.  - Patient's case was found appropriate for dual antiplatelet therapy.    Indication for procedure: Pre-Op Diagnosis Codes:      * NSTEMI (non-ST elevated myocardial infarction) (CMS/[facility]) [I21.4]    Patient's clinical presentation to the cardiac cath lab: NSTEMI.    Patient is at risk for stroke due to extensive atherosclerotic vascular disease.   Patient appears to be moderately frail.         Total anti-anginal medications: 1.     Patient is presenting today with no CHF.    Pre-sedation assessment  ASA 3  Mallampati class: III - soft palate, base of uvula visible.    Explained to the patient that there this procedure will be performed without cardiac surgery backup on site but full backup is available at [facility] should the need for emergency cardiac surgery arises.

## 2025-01-08 NOTE — PROGRESS NOTES
Hospital Medicine     Daily Progress Note       SUBJECTIVE   Patient seen and evaluated at bedside in the ICU after cath this morning.  Conscious, oriented in time place and person.  States that she is feeling better today.  Cardiac cath did not show any ischemia.  No acute events reported overnight     OBJECTIVE   Vital signs in last 24 hours:  Temp:  [36.4 °C (97.6 °F)-36.9 °C (98.5 °F)] 36.4 °C (97.6 °F)  Heart Rate:  [72-87] 80  Resp:  [13-35] 24  BP: (124-182)/(62-80) 127/62  FiO2 (%) (Set):  [28 %-30 %] 30 %    Intake/Output Summary (Last 24 hours) at 1/8/2025 1357  Last data filed at 1/8/2025 1006  Gross per 24 hour   Intake 10 ml   Output 60 ml   Net -50 ml       Physical Exam  General: No apparent distress, appears stated age,talkative    HNT: normocephalic, atraumatic, moist mucous membranes    Eyes: Sclera anicteric, EOMI, PERRL    Respiratory : rhonchi bilaterally    CVS: No JVD, S1 S2 heard, no murmurs, pulse irregular rate and rhythm    Abdomen: Soft, non-tender, non-distended, no evidence of free fluid appreciated, bowel sounds noted    Genitourinary: No CVA tenderness, External catheter    Extremities: Pulses 2+ bilaterally, No edema/cyanosis    Neurologic: Alert/awake/oriented X 3, following verbal commands, grossly no focal neurological deficits appreciated     LINES, DRAINS, AIRWAYS, WOUNDS   Peripheral IV (Adult) 01/04/25 Left Antecubital (Active)   Number of days: 4       Peripheral IV (Adult) 01/04/25 Anterior;Proximal;Right Forearm (Active)   Number of days: 4       External Urinary Catheter Female (one size) (Active)   Number of days: 4       Catheterization Site - Arterial Right Radial 6 Fr. (Active)   Number of days: 0        LABS, IMAGING, TELEMETRY   CHEMISTRIES   Results from last 7 days   Lab Units 01/08/25  0314 01/07/25  0506 01/06/25  0527 01/05/25  0340 01/04/25  1133 01/04/25  1105   SODIUM mEQ/L 135* 129* 128* 132*  --  128*   CHLORIDE mEQ/L 103 98 95* 96*  --  91*    CO2 mEQ/L 28 28 27 27  --  21   BUN mg/dL 54* 57* 51* 36*  --  29*   CREATININE mg/dL 0.9 0.9 0.9 0.9  --  1.0   GLUCOSE mg/dL 187* 224* 242* 193*  --  310*   CALCIUM mg/dL 8.0* 8.2* 8.5* 8.5*  --  9.0   EGFR mL/min/1.73m*2 >60.0 >60.0 >60.0 >60.0  --  56.7*   ANION GAP mEQ/L 4 3 6 9  --  16*   MAGNESIUM mg/dL 2.2 2.3 2.4 1.8   < >  --     < > = values in this interval not displayed.     CBC RESULTS   Results from last 7 days   Lab Units 01/08/25  0314 01/07/25  0506 01/06/25  0527 01/05/25  0340 01/04/25  1105   WBC K/uL 17.98* 15.81* 18.61* 12.81* 11.96*   HEMOGLOBIN g/dL 13.4 12.7 13.8 13.7 14.6   HEMATOCRIT % 41.1 37.8 43.3 41.9 45.1*   PLATELETS K/uL 276 232 252 274 331       Radiology Reports in last 24hNo results found.     ASSESSMENT AND PLAN     # Acute respiratory insufficiency w/ hypoxia, improving  -Not oxygen dependent at baseline.  2/2 RSV with acute asthma exacerbation  -Currently on 1L  --Option of BiPAP 15/8 PRN respiratory distress/increased work of breathing  -Encourage incentive spirometer use while hospitalized  -OOB to chair  - Pulm following     # Asthma, acutely exacerbated  --Maintained on high-dose Breo Ellipta, Singulair, and Albuterol PRN at home  -S/p Azithromycin 500mg IV x3 doses, course completed on 1/6  -Mucinex 1,200mg PO BID  -Singulair 10mg PO qHS  -Solumedrol 40mg IV q12 hours  -Out-patient follow up w/ Dr. Mcdaniel upon discharge  - currently on: albuterol-ipratropium 2.5 mg-0.5 mg nebulizer TID     # RSV infection  -PCR positive on 1/4  -Supportive care  -Leukocytosis present on labs, suspected to be secondary to steroids     # Diabetes mellitus type 2  -Maintained on Januvia out-patient.  Hyperglycemic at presentation, likely due to steroid use PTA  --Humalog 5 units qAC   ---SSI for additional coverage  -HgB A1C of 7.5% as of 1/4/25     # Elevated troponin  -P/w elevated troponin and ischemic changes on EKG concerning for subendocardial ischemia. Denies chest pain.  Presentation not c/w ACS  --HST 1,645.3 -> 1,848.3 -> 1,603.8  -Cardiology input appreciated  --S/p Heparin gtt x48 hours.  -Out-patient Coumadin on hold-resumed 1/8  - cardiac catheterization 1/8 with no ischemia  -2D echo from 1/6 w/ mild cLVH and EF 55%. A small portion of the the anterolateral wall appears to be markedly hypokinetic. Mild TR. RVSP = 63 mmHg. Mildly dilated RA, moderately dilated LA  -C/w ASA & Statin therapies     # Hyperlipidemia  -Maintained on Atorvastatin out-patient  -FLP from 1/4 w/ Chol 100, Tg 90, HDL 36, LDL 46  -Dosing increased. Continue while hospitalized     # Atrial fibrillation  -Maintained on Coumadin out-patient. Rates currently controlled  -S/p Heparin gtt as outlined above. Currently on warfrain  -Cardiology following  - continue home: warfarin 3.5 mg po once     # Hyponatremia  -Na levels of 128 at presentation, 135 today  -Free water restriction   -Daily BMP     # Hypertension  -Does not appear to have a formal h/o hypertension. BB had been held at presentation. BP noted to be elevated on 1/6 per nursing, ranging 147-200/65-81mmHg  -Hydralazine 10mg IVP q6 hours PRN SBP >160mmHg  -Cardiology following  --Toprol XL 50mg pO qDay  --Valsartan 40mg PO qDay  -Monitor BP     # Obesity  -Body mass index is 30.32 kg/m².  -Complicates medical care. Diet and exercise w/ a goal of weight loss to a more ideal body weight encouraged       VTE Prophylaxis:  Current anticoagulants:  warfarin (COUMADIN) tablet 3.5 mg, oral, Once  warfarin (COUMADIN) therapy by pharmacy protocol, Other, evaluate daily      Code Status: Full Code        Estimated Discharge Date: 1/10/2025   Disposition Planning: continue current level of care       Sapphire Howard MD  1/8/2025

## 2025-01-08 NOTE — PROGRESS NOTES
Warfarin Dosing by Pharmacy Consult Initiated    Ev Monzon is a 79 y.o. female who has been consulted for warfarin dosing for Atrial fibrillation  prescribed by ZAIN Das.    Reviewed relevant clinical data including weight, previous warfarin doses, CBC and PT/INR levels:  PT   Date/Time Value Ref Range Status   01/08/2025 0314 21.7 (H) 12.2 - 14.5 sec Final   01/06/2025 0825 29.5 (H) 12.2 - 14.5 sec Final   01/04/2025 1105 27.7 (H) 12.2 - 14.5 sec Final     INR   Date/Time Value Ref Range Status   01/08/2025 0314 1.9   Final     Comment:     Moderate Intensity Anticoagulation = 2.0 to 3.0, High Intensity = 2.5 to 3.5   01/06/2025 0825 2.9   Final     Comment:     Moderate Intensity Anticoagulation = 2.0 to 3.0, High Intensity = 2.5 to 3.5   01/04/2025 1105 2.6   Final     Comment:     Moderate Intensity Anticoagulation = 2.0 to 3.0, High Intensity = 2.5 to 3.5       CBC Results         01/08/25 01/07/25 01/06/25     0314 0506 0527    WBC 17.98 15.81 18.61    RBC 4.40 4.10 4.67    HGB 13.4 12.7 13.8    HCT 41.1 37.8 43.3    MCV 93.4 92.2 92.7    MCH 30.5 31.0 29.6    MCHC 32.6 33.6 31.9     232 252               Warfarin Administrations (last 96 hours)       None            Vitamin K Administrations (last 96 hours)       None            Assessment/Plan  The patient is ordered warfarin dosing by pharmacy.      Patient does satisfy criteria for increased warfarin sensitivity.    Patient is not receiving concurrent parenteral anticoagulation for bridging purposes.    Will initiate warfarin dose of 3.5 mg PO x1.    Target INR goal of 2 - 3 per warfarin dosing per pharmacy order.  Pharmacy will continue to follow the patient's warfarin dosing daily.    Please call INR levels to the pharmacy.  Amelia Guevara, DeuceD

## 2025-01-08 NOTE — PLAN OF CARE
Care Coordination Discharge Plan Note     Discharge Needs Assessment  Concerns to be Addressed: denies needs/concerns at this time  Current Discharge Risk: chronically ill    Anticipated Discharge Plan  Anticipated Discharge Disposition: home with assistance, home with home health, home without assistance or services  Type of Home Care Services: home OT, home PT, nursing      Patient Choice  Offered/Gave Vendor List: no  Patient's Choice of Community Agency(s):           ---------------------------------------------------------------------------------------------------------------------    Interdisciplinary Discharge Plan Review:  Participants:     Concerns Comments: Discussed during Care Progression Rounds.  pt to [facility] today for Left Heart Cath.  ROSAURA and Dispo pending, awaiting PT/OT eval for dispo assist.  CC will continue to follow and assist with DCP.    Discharge Plan:   Disposition/Destination:   /    Discharge Facility:    Community Resources:      Discharge Transportation:  Is Out of Hospital DNR needed at Discharge: no  Does patient need discharge transport? No

## 2025-01-08 NOTE — PROGRESS NOTES
" Pulmonary Daily Progress Note    SUBJECTIVE: Patient seen and examined.  No acute complaints offered today.  Continues to have cough and mostly upper airways congestion.  Sating well on 2 liters of low flow O2.  No events noted overnight.  NPO for tentative cath this AM.  Able to lay flat without issue.    Allergies:   Reglan [metoclopramide hcl], Amoxicillin, Bactrim [sulfamethoxazole-trimethoprim], Formaldehyde, and Latex    Review of Systems:  Review of Systems   Respiratory:  Positive for cough and shortness of breath. Negative for wheezing.    Cardiovascular:  Negative for chest pain.   Gastrointestinal:  Negative for abdominal pain.   All other systems reviewed and are negative.      Input/Ouput in Last 24 hours:    Intake/Output Summary (Last 24 hours) at 1/8/2025 0914  Last data filed at 1/8/2025 0738  Gross per 24 hour   Intake 850 ml   Output 50 ml   Net 800 ml       Objective     Vital Signs for the last 24 hours:  Visit Vitals  /63   Pulse 86   Temp 36.4 °C (97.6 °F) (Temporal)   Resp (!) 26   Ht 1.473 m (4' 10\")   Wt 64.8 kg (142 lb 13.7 oz)   SpO2 95%   BMI 29.86 kg/m²     Oxygen Therapy: Supplemental oxygen (2 liters nasal cannula)    Physical Exam:  Physical Exam  Vitals and nursing note reviewed.   Constitutional:       General: She is not in acute distress.     Appearance: She is well-developed. She is ill-appearing.   HENT:      Head: Normocephalic and atraumatic.   Eyes:      General: No scleral icterus.     Pupils: Pupils are equal, round, and reactive to light.   Neck:      Trachea: No tracheal deviation.   Cardiovascular:      Rate and Rhythm: Normal rate. Rhythm irregular.      Heart sounds: Normal heart sounds.   Pulmonary:      Effort: Pulmonary effort is normal.      Breath sounds: Wheezing present.      Comments: Coarse air entry  Abdominal:      General: Bowel sounds are normal.      Palpations: Abdomen is soft.   Musculoskeletal:         General: No deformity.   Skin:     " General: Skin is warm and dry.   Neurological:      Mental Status: She is alert and oriented to person, place, and time. Mental status is at baseline.          LABS:  Results from last 7 days   Lab Units 01/08/25  0314 01/07/25  0506 01/06/25  0527 01/05/25  0340 01/04/25  1105   SODIUM mEQ/L 135* 129* 128* 132* 128*   CO2 mEQ/L 28 28 27 27 21   BUN mg/dL 54* 57* 51* 36* 29*   CALCIUM mg/dL 8.0* 8.2* 8.5* 8.5* 9.0   ALBUMIN g/dL  --   --   --   --  4.3   PHOSPHORUS mg/dL  --   --  3.5 4.6  --      Results from last 7 days   Lab Units 01/08/25  0314 01/07/25  0506 01/06/25  0527   WBC K/uL 17.98* 15.81* 18.61*   PLATELETS K/uL 276 232 252     Lab Results   Component Value Date    WBC 17.98 (H) 01/08/2025    HGB 13.4 01/08/2025    HCT 41.1 01/08/2025     01/08/2025    CHOL 100 01/04/2025    TRIG 90 01/04/2025    HDL 36 (L) 01/04/2025    ALT 11 01/04/2025    AST 46 (H) 01/04/2025     (L) 01/08/2025    K 5.2 (H) 01/08/2025     01/08/2025    CREATININE 0.9 01/08/2025    BUN 54 (H) 01/08/2025    CO2 28 01/08/2025    INR 1.9 01/08/2025    HGBA1C 7.5 (H) 01/04/2025         Current Facility-Administered Medications:     acetaminophen (TYLENOL) tablet 650 mg, 650 mg, oral, q4h PRN, Juwan Mcdaniel DO    aspirin enteric coated tablet 81 mg, 81 mg, oral, Daily, Juwan Mcdaniel DO, 81 mg at 01/08/25 0914    atorvastatin (LIPITOR) tablet 80 mg, 80 mg, oral, Daily (6p), Juwan Mcdaniel DO, 80 mg at 01/07/25 1716    benzonatate (TESSALON) capsule 100 mg, 100 mg, oral, 3x daily PRN, Juwan Mcdaniel DO, 100 mg at 01/07/25 0615    glucose chewable tablet 16-32 g of dextrose, 16-32 g of dextrose, oral, PRN **OR** dextrose 40 % oral gel 15-30 g of dextrose, 15-30 g of dextrose, oral, PRN **OR** glucagon (GLUCAGEN) injection 1 mg, 1 mg, intramuscular, PRN **OR** dextrose 50 % in water (D50) injection 12.5 g, 25 mL, intravenous, PRN, Juwan Mcdaniel DO    enoxaparin (LOVENOX) syringe 70 mg, 1 mg/kg,  subcutaneous, q12h INT, Juwan Mcdaniel, DO, 70 mg at 01/08/25 0606    famotidine (PEPCID) tablet 20 mg, 20 mg, oral, Nightly, Juwan Mcdaniel, DO, 20 mg at 01/07/25 2118    guaiFENesin (MUCINEX) 12 hr ER tablet 1,200 mg, 1,200 mg, oral, BID, Juwan Mcdaniel, DO, 1,200 mg at 01/08/25 0914    hydrALAZINE (APRESOLINE) injection 10 mg, 10 mg, intravenous, q6h PRN, Juwan Mcdaniel, DO, 10 mg at 01/08/25 0407    insulin lispro U-100 (HumaLOG) pen 3-5 Units, 3-5 Units, subcutaneous, Before meals & nightly, Juwan Mcdaniel, DO, 3 Units at 01/08/25 0919    insulin lispro U-100 (HumaLOG) pen 5 Units, 5 Units, subcutaneous, TID with meals, Juwan Mcdaniel DO, 5 Units at 01/08/25 0919    ipratropium-albuteroL (DUO-NEB) 0.5-2.5 mg/3 mL nebulizer solution 3 mL, 3 mL, nebulization, q6h while awake, Juwan Mcdaniel DO, 3 mL at 01/08/25 0733    methylPREDNISolone sod suc(PF) (SOLU-Medrol) injection 40 mg, 40 mg, intravenous, q8h INT, Juwan Mcdaniel, DO, 40 mg at 01/08/25 0309    metoprolol succinate XL (TOPROL-XL) 24 hr ER tablet 50 mg, 50 mg, oral, Daily, Juwan Mcdaniel, DO, 50 mg at 01/08/25 0914    montelukast (SINGULAIR) tablet 10 mg, 10 mg, oral, Nightly, Juwan Mcdaniel, DO, 10 mg at 01/07/25 2118    ondansetron (ZOFRAN) injection 4 mg, 4 mg, intravenous, q6h PRN, Juwan Mcdaniel, DO, 4 mg at 01/04/25 1554    polyethylene glycol (MIRALAX) 17 gram packet 17 g, 17 g, oral, Daily, Juwan Mcdaniel E, DO, 17 g at 01/07/25 1213    sennosides-docusate sodium (SENOKOT-S) 8.6-50 mg per tablet 1 tablet, 1 tablet, oral, Nightly, Juwan Mcdaniel, DO, 1 tablet at 01/07/25 2118    valsartan (DIOVAN) tablet 40 mg, 40 mg, oral, Daily, Juwan Mcdaniel, DO, 40 mg at 01/08/25 0915      Imaging/Radiology:  No new chest imaging available to review      IMPRESSION AND PLAN :    Acute respiratory insufficiency w/ hypoxia, improving  -Not oxygen dependent at baseline.  Hypoxic at presentation secondary to RSV infection resulting in  an asthma exacerbation  --Chest x-ray from 1/4 w/ clear lung fields  -Continues to sat well on 2 liters of low flow O2.  Continue efforts to wean supplemental oxygen as tolerated.  Titrate FiO2 to maintain SpO2 >=90%  --Option of BiPAP 15/8 PRN respiratory distress/increased work of breathing  -Encourage incentive spirometer use while hospitalized  -OOB to chair.  Mobilize as tolerated  --PT/OT following     Asthma, acutely exacerbated  -Follows w/ Dr. Mcdaniel.  Last seen 11/22/24.  Spirometry at that time w/ FVC 1.07L (57%), FEV1 0.79L (66%), FEV1/FVC 74% c/w small airways disease and co-existing restriction  --Maintained on high-dose Breo Ellipta, Singulair, and Albuterol PRN  --Started on a Z-krys and a tapering course of Prednisone for an acute flare on 1/2 after telephone consultation w/ Dr. Mcdaniel  -Now w/ severe bronchospasm in the setting of RSV induced asthma exacerbation  -S/p Azithromycin 500mg IV x3 doses, course completing on 1/6  -Mucinex 1,200mg PO BID  -Duonebs q6 hours while awake -> Continue for today.  Possible transition to PRN tomorrow depending on her exam  -Singulair 10mg PO qHS  -Solumedrol 40mg IV q8 hours.  Decrease to q12 hours today.  Continue to taper as condition allows  -Out-patient follow up w/ Dr. Mcdaniel upon discharge  --Next scheduled appt is 5/9/25 at 1:15pm.  This can be rescheduled as necessary      RSV infection  -PCR positive on 1/4  -Contact and Droplet precautions per guidelines  -Supportive care  -Leukocytosis present on labs, suspected to be secondary to steroid induced demargination  --WBC: 12.8 -> 18.6 -> 15.8 -> 17.9K      Elevated troponin/NSTEMI  -P/w elevated troponin and ischemic changes on EKG concerning for subendocardial ischemia.  Denies chest pain.  Presentation not c/w ACS  --HST 1,645.3 -> 1,848.3 -> 1,603.8  -Cardiology input appreciated  --S/p Heparin gtt x48 hours.  Now on therapeutic Lovenox for atrial fibrillation  ---Out-patient Coumadin on  hold  --Planned for cardiac catheterization today. No pulmonary contraindications  -2D echo from 1/6 w/ mild cLVH and EF 55%.  A small portion of the the anterolateral wall appears to be markedly hypokinetic.  Mild TR.  RVSP = 63 mmHg. Mildly dilated RA, moderately dilated LA  -C/w ASA & Statin therapies      Atrial fibrillation  -Maintained on Coumadin out-patient.  Rates currently controlled  -S/p Heparin gtt as outlined above.  Currently on therapeutic Lovenox  --Coumadin on hold  -Cardiology following  -Monitor telemetry      Obesity  -Body mass index is 30.32 kg/m².  -Complicates medical care.  Diet and exercise w/ a goal of weight loss to a more ideal body weight encouraged    Diet: Regular diet, 1800 carb, 1500mL fluid, cardiac  DVT prophylaxis: low molecular weight heparin (therapeutic AC)  Therapies: PT/OT  Code status: Full Code    Case d/w: Patient, Nursing, and Bailey Rajendra    The case was reviewed this AM at multidisciplinary rounds with the patient's nurse, dietician, pharmacist, respiratory therapist, , , and critical care nurse coordinator. The patient's clinical status with regards to diagnosis and treatment plans including disposition of any IV, arterial lines, mccall, and tubes were discussed as well as dietary, respiratory therapy, and mobilization needs. This process required approximately 10 minutes of coordination of care.

## 2025-01-08 NOTE — PROGRESS NOTES
Inpatient Cardiology   Daily Progress Note       Overview:     - 78 y/o, PMH of afib on Warfarin, asthma/COPD, HTN, DM2 who presented on 1/4 with increasing shortness of breath despite outpatient steroids and Z-krys on 1/2. Admitted for RSV and asthma exacerbation. Cardiology consulted for elevated troponin.   - Transfer to telemetry on 1/7   - [facility] 1/8 with patent coronaries. Therapeutic Lovenox discontinued and Warfarin resumed.      Assessment/Plan   Assessment & Plan  Elevated troponin I level  - Negative Lexiscan in 2021  - HST peak of 1800 with ST/T wave abnormalities on admission EKG (new compared to prior 2018 tracings in epic). Denies any obvious s/s of ACS (SOB/wheezing/cough with RSV)   - Echo 1/6/25: Estimated EF 55%. A small portion of the the anterolateral wall appears to be markedly hypokinetic. (EF 60-65% in March 2023 with no noted RWMA)   - [facility] on 1/8 with patent coronaries  - Elevated trop likely acute myocardial injury in setting of hypoxic respiratory failure     - Continue aspirin and high intensity statin  - Continue Toprol XL and Valsartan  - No further cardiovascular evaluation is indicated at this time.     A-fib (CMS/[facility])  - Chads- Vasc at least 5. On Warfarin. Known to Daron Avendaño (last office visit noted in 2021)     - Continue Toprol XL 50mg daily  - Resume Digoxin once Azithromycin course is complete.   - Can d/c therapeutic Lovenox. Resume Warfarin for goal INR 2.0 - 3.0   Nonrheumatic aortic valve stenosis  - Echo 1/6/25: Aortic Valve: Tricuspid valve. Sclerotic leaflets. Mild regurgitation. Mild to moderate stenosis. Peak velocity = 2.57 m/s. Mean gradient = 14.00 mmHg. Calculated area by cont eq = 1.44 cm2. Calculated dimensionless index = 0.46. (Mild AS on Echo March 2023 care everywhere)     - Continue routine surveillance   Essential hypertension, benign  - BP improved with adjustment in meds     - Continue Toprol XL   - Continue Valsartan 40mg -- increase to BID. Can  transition to daily dosing once final dose is determined  - Can keep IV Hydralazine PRN   Obstructive chronic bronchitis with exacerbation (CMS/[facility])  - Known to Dr. Mcdaniel as outpatient    - Supplemental 02/bi-pap as respiratory status requires   - Nebs/ steroids / inhalers / antibiotics  - Pulmonary is following  Dyslipidemia  - Continue statin   DM (diabetes mellitus) (CMS/[facility])  - Glucose management per medicine   - Statin/ARB as outlined  RSV (respiratory syncytial virus infection)  - Management per pulm         Subjective   Seen and examined. Personally assisted patient's bed to laying flat. She states if this happened upon admission she would feel SOB and panicky. She feels just fine at this time. Occasional dry cough but overall she is noticing daily improvement. D/W nursing, pulmonary, medicine, interventional cardiology       Current Medications:    Scheduled:   [Transfer Hold] aspirin  81 mg oral Daily    [Transfer Hold] atorvastatin  80 mg oral Daily (6p)    [Transfer Hold] enoxaparin  1 mg/kg subcutaneous q12h INT    [Transfer Hold] famotidine  20 mg oral Nightly    [Transfer Hold] guaiFENesin  1,200 mg oral BID    [Transfer Hold] insulin lispro U-100  3-5 Units subcutaneous Before meals & nightly    [Transfer Hold] insulin lispro U-100  5 Units subcutaneous TID with meals    [Transfer Hold] ipratropium-albuteroL  3 mL nebulization q6h while awake    [Transfer Hold] methylPREDNISolone sodium succinate  40 mg intravenous q8h INT    [Transfer Hold] metoprolol succinate XL  50 mg oral Daily    [Transfer Hold] montelukast  10 mg oral Nightly    [Transfer Hold] polyethylene glycol  17 g oral Daily    [Transfer Hold] sennosides-docusate sodium  1 tablet oral Nightly    [Transfer Hold] valsartan  40 mg oral Daily       Infusions:      PRN:    [Transfer Hold] acetaminophen    [Transfer Hold] benzonatate    [Transfer Hold] glucose **OR** [Transfer Hold] dextrose **OR** [Transfer Hold] glucagon **OR** [Transfer  Hold] dextrose 50 % in water (D50)    [Transfer Hold] hydrALAZINE    [Transfer Hold] ondansetron     Objective   Vital signs in last 24 hours:  Temp:  [36.4 °C (97.6 °F)-37.2 °C (99 °F)] 36.4 °C (97.6 °F)  Heart Rate:  [72-89] 86  Resp:  [13-35] 26  BP: (124-182)/(62-80) 136/63  FiO2 (%) (Set):  [28 %-30 %] 30 %    Weights (last 5 days)       Date/Time Weight    01/08/25 0615 64.8 kg (142 lb 13.7 oz)    01/07/25 0501 68.4 kg (150 lb 12.7 oz)    01/06/25 0844 65.8 kg (145 lb)    01/06/25 0539 66.1 kg (145 lb 11.6 oz)    01/05/25 1038 65.6 kg (144 lb 10 oz)    01/05/25 0537 65.8 kg (145 lb 1 oz)    01/04/25 1056 65.8 kg (145 lb 1.6 oz)            Intake/Output Summary (Last 24 hours) at 1/8/2025 0659  Last data filed at 1/8/2025 0617  24 Hour Net Input/Output from 7AM Yesterday   Intake 850 ml   Output 50 ml   Net 800 ml     Net IO Since Admission: 959.09 mL [01/08/25 1017]    Physical Exam  Vitals and nursing note reviewed.   HENT:      Head: Normocephalic.      Mouth/Throat:      Mouth: Mucous membranes are moist.   Eyes:      General: No scleral icterus.  Cardiovascular:      Rate and Rhythm: Normal rate. Rhythm irregular.      Heart sounds: Murmur (II/VI LEONIDAS at the LSB) heard.   Pulmonary:      Effort: Pulmonary effort is normal.      Breath sounds: Wheezing present.      Comments:   + dry cough  2L  Musculoskeletal:      Right lower leg: No edema.      Left lower leg: No edema.   Skin:     General: Skin is warm and dry.   Neurological:      Mental Status: She is alert and oriented to person, place, and time.   Psychiatric:         Mood and Affect: Mood normal.            Labs and Data:      Hematology    Results from last 7 days   Lab Units 01/08/25  0314 01/07/25  0506 01/06/25  0825 01/06/25  0527 01/05/25  0340 01/04/25  1105   WBC K/uL 17.98* 15.81*  --  18.61*   < > 11.96*   HEMOGLOBIN g/dL 13.4 12.7  --  13.8   < > 14.6   HEMATOCRIT % 41.1 37.8  --  43.3   < > 45.1*   PLATELETS K/uL 276 232  --  252   < >  [FreeTextEntry1] :  MARIANA is being seen today for a follow up visit for continuation of psychiatric/pharmacologic management.    [facility] ISTOP: R Reference #: [id number] 04/03/2024	04/04/2024	alprazolam 1 mg tablet	60	30	Zenia Lynn	[id number]  MPRESSION: CHELA, Panic Disorder , Mood disorder, unspecified  DDX: Insomnia related to psychiatric condition, Adjustment Disorder, PTSD   Assessment: Mood and anxiety sxs: Improved. No SIIP/HIIP/SIB/AVH/Psychosis. No evidence of benzodiazepine abuse/misuse, lessened occurrence of panic. Less frequent use of Alprazolam. Propranolol helpful Sleep sxs: Nightmares improved, remains with difficulty with sleep latency Safety concerns: No identifiable risk, patient is not a harm risk to self or others. Functional status: No identifiable impairments Medications:  Standing: Lexapro 30mg orally once daily; mood PRN: Alprazolam 1 mg orally as needed; Anxiety; Propranolol 30mg orally (1-2tabs) once daily as needed for anxiety;  331   INR  1.9  --  2.9  --   --  2.6    < > = values in this interval not displayed.       Chemistries    Results from last 7 days   Lab Units 01/08/25  0314 01/07/25  0506 01/06/25  0527 01/04/25  1133 01/04/25  1105   SODIUM mEQ/L 135* 129* 128*   < > 128*   POTASSIUM mEQ/L 5.2* 5.2* 4.4   < > 4.4   CHLORIDE mEQ/L 103 98 95*   < > 91*   CREATININE mg/dL 0.9 0.9 0.9   < > 1.0   BUN mg/dL 54* 57* 51*   < > 29*   CO2 mEQ/L 28 28 27   < > 21   GLUCOSE mg/dL 187* 224* 242*   < > 310*   CALCIUM mg/dL 8.0* 8.2* 8.5*   < > 9.0   MAGNESIUM mg/dL 2.2 2.3 2.4   < >  --    ALT IU/L  --   --   --   --  11   AST IU/L  --   --   --   --  46*    < > = values in this interval not displayed.       Biomarkers    Results from last 7 days   Lab Units 01/04/25  1719 01/04/25  1330 01/04/25  1105   HIGH SENSITIVE TROPONIN I pg/mL 1,603.8* 1,848.3* 1,645.3*       Cholesterol    Lab Results   Component Value Date    CHOL 100 01/04/2025    TRIG 90 01/04/2025    HDL 36 (L) 01/04/2025    LDLCALC 46 01/04/2025    NONHDLCALC 64 01/04/2025       Endocrine    Lab Results   Component Value Date    HGBA1C 7.5 (H) 01/04/2025      Radiology:  I have independently reviewed the pertinent imaging from the last 24 hrs.    X-RAY CHEST 1 VIEW    Result Date: 1/4/2025  IMPRESSION: Cardiomegaly       Cardiology:    Telemetry  rate controlled atrial fibrillation         Cardiac Imaging    TRANSTHORACIC ECHO (TTE) COMPLETE 01/06/2025    Interpretation Summary    Left Ventricle: Normal ventricle size. Mild concentric left ventricular hypertrophy. Low normal systolic function. Estimated EF 55%.  A small portion of the the anterolateral wall appears to be markedly hypokinetic.  The interventricular septum appears to move normally.  The remaining LV wall segments appear to contract normally. Diastolic function: patient in a-fib.    Aortic Valve: Tricuspid valve.  Sclerotic leaflets. Mild regurgitation. Mild to moderate stenosis. Peak velocity = 2.57 m/s. Mean  gradient = 14.00 mmHg. Calculated area by cont eq = 1.44 cm2. Calculated dimensionless index = 0.46.    Tricuspid Valve: Structure is grossly normal. Mild regurgitation. Estimated RVSP = 63 mmHg.    Pulmonic Valve: Grossly normal structure. Mild regurgitation.    Right Atrium: Mildly perhaps moderately dilated atrium.    Left Atrium: Moderately dilated atrium.    Pericardium: No evidence of pericardial effusion.    Mitral Valve: Sclerotic mitral valve. Normal leaflet motion. Trace regurgitation.    Right Ventricle: Normal ventricle size. Normal systolic function.    Aorta: Aortic root sclerotic. Sinuses of Valsalva sclerotic. Ascending aorta sclerotic.    IVC/SVC: Inferior vena cava is <2.1cm. Inferior vena cava collapses <50% during inspiration.                  Bailey Drew, ZAIN  1/8/2025

## 2025-01-08 NOTE — ASSESSMENT & PLAN NOTE
- BP improved with adjustment in meds     - Continue Toprol XL   - Continue Valsartan 40mg -- increase to BID. Can transition to daily dosing once final dose is determined  - Can keep IV Hydralazine PRN

## 2025-01-08 NOTE — PROGRESS NOTES
Patient: Ev Monzon  Location: [facility] Unit 2601  MRN:  [id number]  Today's date:  1/8/2025    Attempted to see patient for therapy. Unable due to medical hold (Attempted to see pt for evaluation this AM - 4hr UE mobility restriction post cath placement. F/u following 4hr period and cath site continues to bleed - advised by RN to f/u due to presence of persistent bleeding while being anticoagulated. Will f/u).

## 2025-01-09 LAB
ANION GAP SERPL CALC-SCNC: 3 MEQ/L (ref 3–15)
ANION GAP SERPL CALC-SCNC: 6 MEQ/L (ref 3–15)
BASE EXCESS BLDA CALC-SCNC: 8.2 MEQ/L
BASOPHILS # BLD: 0.02 K/UL (ref 0.01–0.1)
BASOPHILS NFR BLD: 0.1 %
BUN SERPL-MCNC: 52 MG/DL (ref 7–25)
BUN SERPL-MCNC: 56 MG/DL (ref 7–25)
CA-I BLD-SCNC: 1.18 MMOL/L (ref 1.15–1.27)
CALCIUM SERPL-MCNC: 8.1 MG/DL (ref 8.6–10.3)
CALCIUM SERPL-MCNC: 8.3 MG/DL (ref 8.6–10.3)
CHLORIDE BLDA-SCNC: 101 MEQ/L (ref 98–109)
CHLORIDE SERPL-SCNC: 102 MEQ/L (ref 98–107)
CHLORIDE SERPL-SCNC: 102 MEQ/L (ref 98–107)
CO2 BLDA-SCNC: 36 MEQ/L (ref 22–32)
CO2 SERPL-SCNC: 26 MEQ/L (ref 21–31)
CO2 SERPL-SCNC: 31 MEQ/L (ref 21–31)
CREAT SERPL-MCNC: 0.9 MG/DL (ref 0.6–1.2)
CREAT SERPL-MCNC: 0.9 MG/DL (ref 0.6–1.2)
DIFFERENTIAL METHOD BLD: ABNORMAL
EGFRCR SERPLBLD CKD-EPI 2021: >60 ML/MIN/1.73M*2
EGFRCR SERPLBLD CKD-EPI 2021: >60 ML/MIN/1.73M*2
EOSINOPHIL # BLD: 0 K/UL (ref 0.04–0.36)
EOSINOPHIL NFR BLD: 0 %
ERYTHROCYTE [DISTWIDTH] IN BLOOD BY AUTOMATED COUNT: 13.6 % (ref 11.7–14.4)
GLUCOSE BLD-MCNC: 140 MG/DL (ref 70–99)
GLUCOSE BLD-MCNC: 140 MG/DL (ref 70–99)
GLUCOSE BLD-MCNC: 152 MG/DL (ref 70–99)
GLUCOSE BLD-MCNC: 177 MG/DL (ref 70–99)
GLUCOSE BLD-MCNC: 249 MG/DL (ref 70–99)
GLUCOSE BLDA-MCNC: 135 MG/DL (ref 70–99)
GLUCOSE SERPL-MCNC: 145 MG/DL (ref 70–99)
GLUCOSE SERPL-MCNC: 203 MG/DL (ref 70–99)
HCO3 BLDA-SCNC: 31 MEQ/L (ref 21–28)
HCT VFR BLD AUTO: 41.5 % (ref 35–45)
HGB BLD-MCNC: 13.7 G/DL (ref 11.8–15.7)
HGB BLDA-MCNC: 12 G/DL (ref 12–16)
IMM GRANULOCYTES # BLD AUTO: 0.11 K/UL (ref 0–0.08)
IMM GRANULOCYTES NFR BLD AUTO: 0.6 %
INR PPP: 1.6
LACTATE BLDA-SCNC: 0.8 MMOL/L (ref 0.4–1.6)
LYMPHOCYTES # BLD: 1.56 K/UL (ref 1.2–3.5)
LYMPHOCYTES NFR BLD: 8.1 %
MAGNESIUM SERPL-MCNC: 2.2 MG/DL (ref 1.8–2.5)
MCH RBC QN AUTO: 30.4 PG (ref 28–33.2)
MCHC RBC AUTO-ENTMCNC: 33 G/DL (ref 32.2–35.5)
MCV RBC AUTO: 92.2 FL (ref 83–98)
MICROORGANISM SPEC CULT: NORMAL
MONOCYTES # BLD: 1.24 K/UL (ref 0.28–0.8)
MONOCYTES NFR BLD: 6.5 %
NEUTROPHILS # BLD: 16.22 K/UL (ref 1.7–7)
NEUTS SEG NFR BLD: 84.7 %
NRBC BLD-RTO: 0 %
PCO2 BLDA: 51 MM HG (ref 35–48)
PH BLDA: 7.43 PH (ref 7.35–7.45)
PLATELET # BLD AUTO: 283 K/UL (ref 150–369)
PMV BLD AUTO: 10.4 FL (ref 9.4–12.3)
PO2 BLDA: 92 MM HG (ref 83–100)
POCT PATIENT TEMPERATURE: 98.6 °F (ref 97–99)
POCT TEST (BLD GAS): ABNORMAL
POCT TEST: ABNORMAL
POTASSIUM BLDA-SCNC: 4.9 MEQ/L (ref 3.4–4.5)
POTASSIUM SERPL-SCNC: 5.5 MEQ/L (ref 3.5–5.1)
POTASSIUM SERPL-SCNC: 5.9 MEQ/L (ref 3.5–5.1)
PROTHROMBIN TIME: 19.1 SEC (ref 12.2–14.5)
RBC # BLD AUTO: 4.5 M/UL (ref 3.93–5.22)
SAO2 % BLDA: 96 % (ref 93–98)
SODIUM BLDA-SCNC: 133 MEQ/L (ref 136–145)
SODIUM SERPL-SCNC: 134 MEQ/L (ref 136–145)
SODIUM SERPL-SCNC: 136 MEQ/L (ref 136–145)
WBC # BLD AUTO: 19.15 K/UL (ref 3.8–10.5)

## 2025-01-09 PROCEDURE — 99232 SBSQ HOSP IP/OBS MODERATE 35: CPT | Mod: FS | Performed by: INTERNAL MEDICINE

## 2025-01-09 PROCEDURE — 97166 OT EVAL MOD COMPLEX 45 MIN: CPT | Mod: GO

## 2025-01-09 PROCEDURE — 80048 BASIC METABOLIC PNL TOTAL CA: CPT | Performed by: HOSPITALIST

## 2025-01-09 PROCEDURE — 25000000 HC PHARMACY GENERAL: Performed by: INTERNAL MEDICINE

## 2025-01-09 PROCEDURE — 63600000 HC DRUGS/DETAIL CODE: Mod: JZ,TB | Performed by: INTERNAL MEDICINE

## 2025-01-09 PROCEDURE — 85610 PROTHROMBIN TIME: CPT | Performed by: NURSE PRACTITIONER

## 2025-01-09 PROCEDURE — 85025 COMPLETE CBC W/AUTO DIFF WBC: CPT | Performed by: INTERNAL MEDICINE

## 2025-01-09 PROCEDURE — 94660 CPAP INITIATION&MGMT: CPT

## 2025-01-09 PROCEDURE — 63700000 HC SELF-ADMINISTRABLE DRUG: Performed by: INTERNAL MEDICINE

## 2025-01-09 PROCEDURE — 99233 SBSQ HOSP IP/OBS HIGH 50: CPT | Performed by: STUDENT IN AN ORGANIZED HEALTH CARE EDUCATION/TRAINING PROGRAM

## 2025-01-09 PROCEDURE — 97530 THERAPEUTIC ACTIVITIES: CPT | Mod: GP

## 2025-01-09 PROCEDURE — 25000000 HC PHARMACY GENERAL: Performed by: HOSPITALIST

## 2025-01-09 PROCEDURE — 63600000 HC DRUGS/DETAIL CODE: Mod: JZ | Performed by: STUDENT IN AN ORGANIZED HEALTH CARE EDUCATION/TRAINING PROGRAM

## 2025-01-09 PROCEDURE — 97535 SELF CARE MNGMENT TRAINING: CPT | Mod: GO

## 2025-01-09 PROCEDURE — 25800000 HC PHARMACY IV SOLUTIONS: Performed by: HOSPITALIST

## 2025-01-09 PROCEDURE — 80048 BASIC METABOLIC PNL TOTAL CA: CPT | Performed by: INTERNAL MEDICINE

## 2025-01-09 PROCEDURE — 83735 ASSAY OF MAGNESIUM: CPT | Performed by: INTERNAL MEDICINE

## 2025-01-09 PROCEDURE — 36415 COLL VENOUS BLD VENIPUNCTURE: CPT | Performed by: INTERNAL MEDICINE

## 2025-01-09 PROCEDURE — 21400000 HC ROOM AND CARE CCU/INTERMEDIATE

## 2025-01-09 PROCEDURE — 63700000 HC SELF-ADMINISTRABLE DRUG: Performed by: NURSE PRACTITIONER

## 2025-01-09 PROCEDURE — 36600 WITHDRAWAL OF ARTERIAL BLOOD: CPT

## 2025-01-09 PROCEDURE — 97162 PT EVAL MOD COMPLEX 30 MIN: CPT | Mod: GP

## 2025-01-09 PROCEDURE — 63600000 HC DRUGS/DETAIL CODE: Mod: JZ | Performed by: HOSPITALIST

## 2025-01-09 RX ORDER — ATORVASTATIN CALCIUM 10 MG/1
10 TABLET, FILM COATED ORAL DAILY
Status: DISCONTINUED | OUTPATIENT
Start: 2025-01-09 | End: 2025-01-14 | Stop reason: HOSPADM

## 2025-01-09 RX ORDER — DIGOXIN 125 MCG
125 TABLET ORAL DAILY
Status: DISCONTINUED | OUTPATIENT
Start: 2025-01-09 | End: 2025-01-14 | Stop reason: HOSPADM

## 2025-01-09 RX ORDER — FUROSEMIDE 10 MG/ML
20 INJECTION INTRAMUSCULAR; INTRAVENOUS ONCE
Status: COMPLETED | OUTPATIENT
Start: 2025-01-09 | End: 2025-01-09

## 2025-01-09 RX ORDER — PREDNISONE 20 MG/1
40 TABLET ORAL DAILY
Status: DISCONTINUED | OUTPATIENT
Start: 2025-01-10 | End: 2025-01-13

## 2025-01-09 RX ORDER — CALCIUM GLUCONATE 98 MG/ML
1 INJECTION, SOLUTION INTRAVENOUS ONCE
Status: COMPLETED | OUTPATIENT
Start: 2025-01-09 | End: 2025-01-09

## 2025-01-09 RX ORDER — SODIUM BICARBONATE 1 MEQ/ML
50 SYRINGE (ML) INTRAVENOUS ONCE
Status: COMPLETED | OUTPATIENT
Start: 2025-01-09 | End: 2025-01-09

## 2025-01-09 RX ORDER — IPRATROPIUM BROMIDE AND ALBUTEROL SULFATE 2.5; .5 MG/3ML; MG/3ML
3 SOLUTION RESPIRATORY (INHALATION) EVERY 4 HOURS PRN
Status: DISCONTINUED | OUTPATIENT
Start: 2025-01-09 | End: 2025-01-14 | Stop reason: HOSPADM

## 2025-01-09 RX ADMIN — SODIUM BICARBONATE 50 MEQ: 84 INJECTION INTRAVENOUS at 05:28

## 2025-01-09 RX ADMIN — MONTELUKAST 10 MG: 10 TABLET, FILM COATED ORAL at 20:41

## 2025-01-09 RX ADMIN — IPRATROPIUM BROMIDE AND ALBUTEROL SULFATE 3 ML: .5; 3 SOLUTION RESPIRATORY (INHALATION) at 09:06

## 2025-01-09 RX ADMIN — METOPROLOL SUCCINATE ER TABLETS 50 MG: 50 TABLET, FILM COATED, EXTENDED RELEASE ORAL at 08:17

## 2025-01-09 RX ADMIN — ACETAMINOPHEN 650 MG: 325 TABLET ORAL at 08:18

## 2025-01-09 RX ADMIN — DIGOXIN 125 MCG: 125 TABLET ORAL at 11:14

## 2025-01-09 RX ADMIN — ATORVASTATIN CALCIUM 10 MG: 10 TABLET, FILM COATED ORAL at 11:14

## 2025-01-09 RX ADMIN — SODIUM ZIRCONIUM CYCLOSILICATE 10 G: 10 POWDER, FOR SUSPENSION ORAL at 05:27

## 2025-01-09 RX ADMIN — VALSARTAN 40 MG: 40 TABLET ORAL at 08:17

## 2025-01-09 RX ADMIN — METHYLPREDNISOLONE SODIUM SUCCINATE 40 MG: 40 INJECTION, POWDER, FOR SOLUTION INTRAMUSCULAR; INTRAVENOUS at 16:23

## 2025-01-09 RX ADMIN — FAMOTIDINE 20 MG: 20 TABLET, FILM COATED ORAL at 20:41

## 2025-01-09 RX ADMIN — SODIUM ZIRCONIUM CYCLOSILICATE 10 G: 10 POWDER, FOR SUSPENSION ORAL at 14:03

## 2025-01-09 RX ADMIN — INSULIN LISPRO 5 UNITS: 100 INJECTION, SOLUTION INTRAVENOUS; SUBCUTANEOUS at 12:12

## 2025-01-09 RX ADMIN — IPRATROPIUM BROMIDE AND ALBUTEROL SULFATE 3 ML: .5; 3 SOLUTION RESPIRATORY (INHALATION) at 21:19

## 2025-01-09 RX ADMIN — SODIUM ZIRCONIUM CYCLOSILICATE 10 G: 10 POWDER, FOR SUSPENSION ORAL at 20:41

## 2025-01-09 RX ADMIN — FUROSEMIDE 20 MG: 10 INJECTION, SOLUTION INTRAMUSCULAR; INTRAVENOUS at 21:17

## 2025-01-09 RX ADMIN — METHYLPREDNISOLONE SODIUM SUCCINATE 40 MG: 40 INJECTION, POWDER, FOR SOLUTION INTRAMUSCULAR; INTRAVENOUS at 04:06

## 2025-01-09 RX ADMIN — INSULIN LISPRO 5 UNITS: 100 INJECTION, SOLUTION INTRAVENOUS; SUBCUTANEOUS at 08:19

## 2025-01-09 RX ADMIN — INSULIN LISPRO 5 UNITS: 100 INJECTION, SOLUTION INTRAVENOUS; SUBCUTANEOUS at 16:23

## 2025-01-09 RX ADMIN — GUAIFENESIN 1200 MG: 600 TABLET, EXTENDED RELEASE ORAL at 08:17

## 2025-01-09 RX ADMIN — SODIUM CHLORIDE 500 ML: 9 INJECTION, SOLUTION INTRAVENOUS at 05:22

## 2025-01-09 RX ADMIN — ASPIRIN 81 MG: 81 TABLET, COATED ORAL at 08:17

## 2025-01-09 RX ADMIN — CALCIUM GLUCONATE 1000 MG: 98 INJECTION, SOLUTION INTRAVENOUS at 05:29

## 2025-01-09 RX ADMIN — INSULIN LISPRO 3 UNITS: 100 INJECTION, SOLUTION INTRAVENOUS; SUBCUTANEOUS at 16:23

## 2025-01-09 RX ADMIN — GUAIFENESIN 1200 MG: 600 TABLET, EXTENDED RELEASE ORAL at 20:41

## 2025-01-09 RX ADMIN — SENNOSIDES AND DOCUSATE SODIUM 1 TABLET: 8.6; 5 TABLET ORAL at 20:41

## 2025-01-09 ASSESSMENT — ENCOUNTER SYMPTOMS
WHEEZING: 0
COUGH: 1
ABDOMINAL DISTENTION: 0
SHORTNESS OF BREATH: 1

## 2025-01-09 ASSESSMENT — COGNITIVE AND FUNCTIONAL STATUS - GENERAL
TOILETING: 2 - A LOT
HELP NEEDED FOR BATHING: 2 - A LOT
WALKING IN HOSPITAL ROOM: 3 - A LITTLE
MOVING TO AND FROM BED TO CHAIR: 2 - A LOT
STANDING UP FROM CHAIR USING ARMS: 3 - A LITTLE
MOVING TO AND FROM BED TO CHAIR: 3 - A LITTLE
HELP NEEDED FOR PERSONAL GROOMING: 3 - A LITTLE
WALKING IN HOSPITAL ROOM: 2 - A LOT
STANDING UP FROM CHAIR USING ARMS: 2 - A LOT
AFFECT: CONFUSED;LOW AROUSAL/LETHARGIC
STANDING UP FROM CHAIR USING ARMS: 3 - A LITTLE
CLIMB 3 TO 5 STEPS WITH RAILING: 2 - A LOT
EATING MEALS: 3 - A LITTLE
CLIMB 3 TO 5 STEPS WITH RAILING: 2 - A LOT
CLIMB 3 TO 5 STEPS WITH RAILING: 2 - A LOT
DRESSING REGULAR LOWER BODY CLOTHING: 2 - A LOT
WALKING IN HOSPITAL ROOM: 2 - A LOT
MOVING TO AND FROM BED TO CHAIR: 3 - A LITTLE
AFFECT: CONFUSED;LOW AROUSAL/LETHARGIC
DRESSING REGULAR UPPER BODY CLOTHING: 3 - A LITTLE

## 2025-01-09 NOTE — NURSING NOTE
1930 Pt cath site noted bleeding and increased swelling and bruising above and below cath site. Pressure applied  cath lab called but after hours,  called to assess site. Per Dr Justin cage called. Instructions amberly Cage to replace radial band with 20cc for 4 hours due to pt having received evening dose of coumadin. Bruising outlined on arm and arm elevated

## 2025-01-09 NOTE — PLAN OF CARE
Problem: Adult Inpatient Plan of Care  Goal: Plan of Care Review  Outcome: Progressing  Flowsheets (Taken 1/9/2025 5891)  Progress: improving  Outcome Evaluation: PT IE complete  Plan of Care Reviewed With: patient

## 2025-01-09 NOTE — PLAN OF CARE
Care Coordination Discharge Plan Note     Discharge Needs Assessment  Concerns to be Addressed: care coordination/care conferences  Current Discharge Risk: chronically ill, cognitively impaired    Anticipated Discharge Plan  Anticipated Discharge Disposition: skilled nursing facility  Type of Skilled Nursing Care Services: OT, PT, nursing      Patient Choice  Offered/Gave Vendor List: no  Patient's Choice of Community Agency(s):           ---------------------------------------------------------------------------------------------------------------------    Interdisciplinary Discharge Plan Review:  Participants:     Concerns Comments: Spoke with pt's sister about P.T. recommendations for SNF. She is agreeable to SNF as [facility] Pt's 2 sisters live in the [place] & they would prefer a SNF in that area so they can visit. Their first choice for SNF is the [facility] ([facility] She understands that multiple referals will be made in that area & will discuss available options once responses received.    Discharge Plan:   Disposition/Destination:   /    Discharge Facility:    Community Resources:      Discharge Transportation:  Is Out of Hospital DNR needed at Discharge: no  Does patient need discharge transport? No

## 2025-01-09 NOTE — ASSESSMENT & PLAN NOTE
- Negative Lexiscan in 2021  - HST peak of 1800 with ST/T wave abnormalities on admission EKG (new compared to prior 2018 tracings in epic). Denies any obvious s/s of ACS (SOB/wheezing/cough with RSV)   - Echo 1/6/25: Estimated EF 55%. A small portion of the the anterolateral wall appears to be markedly hypokinetic. (EF 60-65% in March 2023 with no noted RWMA)   - C on 1/8 with patent coronaries  - Elevated trop likely acute myocardial injury in setting of hypoxic respiratory failure     - She does not need aspirin  - Decrease Atorvastatin back to 10mg daily as her lipid panel is acceptable   - Continue Toprol XL and Valsartan  - No further cardiovascular evaluation is indicated at this time.   - Holding Warfarin and warm compresses to RUE. Arnica cream over the counter can be used after discharge.

## 2025-01-09 NOTE — PLAN OF CARE
Problem: Adult Inpatient Plan of Care  Goal: Plan of Care Review  Outcome: Progressing  Flowsheets (Taken 1/9/2025 0312)  Progress: improving  Outcome Evaluation: Pt rec'ed at 1900 A&Ox4 forgetful at times. Afib on monitorRadial site bleeding, see nursing note. @2nc lungs coarse exp. wheezes all fields. Pt onfused at times, pulled radial band off, no bleeding noted. tegaderm and 4x4. Pure wick in place. Bowel regime begun. Isolation for RSV.  Fluid restriction in place.  Plan of Care Reviewed With: patient   Plan of Care Review  Plan of Care Reviewed With: patient  Progress: improving  Outcome Evaluation: Pt rec'ed at 1900 A&Ox4 forgetful at times. Afib on monitorRadial site bleeding, see nursing note. @2nc lungs coarse exp. wheezes all fields. Pt onfused at times, pulled radial band off, no bleeding noted. tegaderm and 4x4. Pure wick in place. Bowel regime begun. Isolation for RSV.  Fluid restriction in place.

## 2025-01-09 NOTE — ASSESSMENT & PLAN NOTE
- BP improved with adjustment in meds     - Continue Toprol XL   - Hold ARB. K is up to 5.9. If K permits, would resume Valsartan at 80mg daily.   - Follow BMP - discussed hyperkalemia with medicine  - Will adjust regimen if she cannot tolerate ARB

## 2025-01-09 NOTE — PROGRESS NOTES
Physical Therapy -  Initial Evaluation     Patient: Ev Monzon  Location: [facility] Intensive Care Unit 2601  MRN: [id number]  Today's date: 1/9/2025    HISTORY OF PRESENT ILLNESS     Ev is a 79 y.o. female admitted on 1/4/2025 with Obstructive chronic bronchitis with exacerbation (CMS/[facility]) [J44.1]  ACS (acute coronary syndrome) (CMS/HCC) [I24.9]  COPD exacerbation (CMS/[facility]) [J44.1]  RSV bronchitis [J20.5]. Principal problem is Obstructive chronic bronchitis with exacerbation (CMS/HCC).    Past Medical History  Ev has a past medical history of Asthma, Atrial fibrillation (CMS/[facility]), COPD (chronic obstructive pulmonary disease) (CMS/[facility]), Diverticulitis, and Type 2 diabetes mellitus (CMS/[facility]).    History of Present Illness   Pt presented to the ED on 1/4 with SOB and wheezing. Pt + for RSV.    1/4 Chest x-Ray: lungs clear, no pleural effusion. + cardiomegaly  1/8: pt underwent L heart cath with coronary angiography  PRIOR LEVEL OF FUNCTION AND LIVING ENVIRONMENT     Prior Level of Function      Flowsheet Row Most Recent Value   Dominant Hand left   Prior Level of Function Comment Pt unable to provide PLOF information 2/2 confusion. Per EMR, pt uses a SPC for ambulation 2/2 knee pain, OT assumes pt is I in ADLs given that pt lives alone, and pt is +    Assistive Device Currently Used at Home cane, straight             Prior Living Environment      Flowsheet Row Most Recent Value   People in Home alone   Current Living Arrangements home   Home Accessibility not wheelchair accessible, stairs to enter home (Group), stairs within home (Group)   Living Environment Comment Pt unable to provide living envorinment details 2/2 confusion. Per EMR, p lives alone in a 2story townhouse with 1STE and ful flight to the 2nd level          VITALS AND PAIN     PT Vitals      Date/Time Pulse Resp SpO2 Pt Activity O2 Therapy O2 Del Method O2 Flow Rate BP MAP BP Location BP Method Pt Position Who   01/09/25  1129 80 -- 93 % At rest Supplemental oxygen Nasal cannula 2 L/min 119/65 -- Left upper arm Automatic Lying CMB   01/09/25 1200 92 12 94 % -- -- -- -- 107/59 82 mmHg Left upper arm Automatic Lying TAR          PT Pain      Date/Time Pain Type Side/Orientation Location Rating: Rest Rating: Activity Interventions [facility]   01/09/25 1129 Pain Assessment right arm 0 - no pain 2 - mild pain care clustered CMB   01/09/25 1200 Pain Assessment -- -- 0 - no pain -- -- TAR             Objective   OBJECTIVE     Start time:  1126  End time:  1204  Session Length: 38 min  Mode of Treatment: co-treatment  Reason for co-treatment: expedite d/c planning and for safe ICU mobility progressions    General Observations  Patient received reclined, in bed. She was agreeable to therapy, no issues or concerns identified by nurse prior to session. Pt asleep upon arrival, initially requiring persistent verbal cues to stay and remain awake.    Precautions: fall, contact, droplet, oxygen therapy device and L/min, modified diet       Limitations/Impairments: safety/cognitive   Services  Do You Speak a Language Other Than English at Home?: no      PT Eval and Treat - 01/09/25 1126          Cognition    Orientation Status oriented to;person     Affect/Mental Status confused;low arousal/lethargic     Follows Commands follows one-step commands;over 90% accuracy;initiation impaired;verbal cues/prompting required;repetition of directions required     Cognitive Function attention deficit;executive function deficit;memory deficit;safety deficit     Attention Deficit focused/sustained attention;requires cues/redirection to task;arousal/alertness     Executive Function Deficit insight/awareness of deficits;information processing;initiation;organization/sequencing;self-monitoring/self-correction;planning/decision-making;problem-solving/reasoning     Memory Deficit recall, biographical information     Safety Deficit insight into  deficits/self-awareness;awareness of need for assistance;problem-solving;safety precautions awareness     Comment, Cognition pt with significant confusion during eval. RN notified        Vision Assessment/Intervention    Vision Assessment unable/difficult to assess   pt stated she wears glasses but unable to state under which circumstances       Hearing Assessment    Hearing Status WFL        Sensory Assessment    Sensory Assessment sensation intact, lower extremities        Lower Extremity Assessment    LE Assessment ROM and Strength WFL        Bed Mobility    Bed Mobility Activities supine to sit     [place] minimum assist (75% or more patient effort);1 person assist     Safety/Cues increased time to complete;verbal cues;initiation;technique     Assistive Device bed rails;head of bed elevated        Mobility Belt    Mobility Belt Used During Session yes        Sit/Stand Transfer    Surface edge of bed;chair with arm rests     [place] minimum assist (75% or more patient effort)     Safety/Cues increased time to complete;verbal cues;hand placement;technique     Assistive Device walker, front-wheeled     Transfer Comments Verbal cues for maintaining RUE on RW in order to minimize weightbearing through arm; STS x 2 from EOB and bedside chair        Gait Training    [place], Gait minimum assist (75% or more patient effort);1 person assist     Safety/Cues increased time to complete;verbal cues;proper use of assistive device     Assistive Device walker, front-wheeled     Distance in Feet 20 feet   4ft x 1 ; 8ft x 1 ; 8ft x 1    Pattern step-through     Deviations/Abnormal Patterns jacob decreased;gait speed decreased;step length decreased;stride length decreased     Comment Bed -> chair ; Chair <> commode ; Pt required occasional cueing and manual RW management when turning in order to sit on chair/commode        Balance    Static Sitting Balance WFL;sitting in chair;sitting, edge of bed     Dynamic  Sitting Balance mild impairment;sitting in chair;sitting, edge of bed     Sit to Stand Dynamic Balance mild impairment;supported     Static Standing Balance mild impairment;supported     Dynamic Standing Balance mild impairment;supported        Impairments/Safety Issues    Impairments Affecting Function balance;endurance/activity tolerance;cognition;range of motion (ROM);strength;pain     Cognitive Impairments, Safety/Performance safety precaution awareness;safety precaution follow-through     Safety Issues Affecting Function safety precaution awareness;safety precautions follow-through/compliance                                              Education Documentation  Treatment Plan, taught by Shaka Belle PT at 1/9/2025  2:43 PM.  Learner: Patient  Readiness: Acceptance  Method: Explanation  Response: No Evidence of Learning  Comment: PT POC, role, DC planning        Session Outcome  Patient leg(s) elevated, in chair at end of session, chair alarm on, all needs met, call light in reach, personal items in reach. Nursing notified about patient's performance, patient's position, and patient's response to therapy/activity.    AM-PAC™ - Mobility (Current Function)     Turning form your back to your side while in flat bed without using bedrails 3 - A Little   Moving from lying on your back to sitting on the side of a flat bed without using bedrails 3 - A Little   Moving to and from a bed to a chair 3 - A Little   Standing up from a chair using your arms 3 - A Little   To walk in a hospital room 3 - A Little   Climbing 3-5 steps with a railing 2 - A Lot   AM-PAC™ Mobility Score 17      ASSESSMENT AND PLAN     Progress Summary  Pt is a 78 yo female admitted to [facility] due to obstructive chronic bronchitis. PLOF/living environment largely limited by pt's confusion throughout session. Per EMR: PTA, pt was using a SPC for ambulation and was IND w/ I/ADL completion. Following PT initial examination, pt was able to complete  supine -> sit, STS w/ RW, and ambulation w/ RW - all at a Min A x 1 level. Due to the pt's current level of function, it is recommended that the pt be discharged to a SNF as he would be unsafe to return home in her current condition. PT will continue to follow.    Patient/Family Therapy Goals Statement: Pt unable to clearly state goal during this time    PT Plan      Flowsheet Row Most Recent Value   Rehab Potential good, to achieve stated therapy goals at 01/09/2025 1126   Therapy Frequency 4 times/wk at 01/09/2025 1126   Planned Therapy Interventions balance training, transfer training, bed mobility training, gait training, stair training, strengthening, patient/family education at 01/09/2025 1126            PT Discharge Recommendations      Flowsheet Row Most Recent Value   PT Recommended Discharge Disposition skilled nursing facility, other (see comments)  [pending improvement in cognition and therapy progress] at 01/09/2025 1126   Anticipated Equipment Needs if Discharged Home (PT) walker, front-wheeled at 01/09/2025 1126                 PT Goals      Flowsheet Row Most Recent Value   Bed Mobility Goal 1    Activity/Assistive Device bed mobility activities, all at 01/09/2025 1126   [place] independent at 01/09/2025 1126   Time Frame by discharge at 01/09/2025 1126   Progress/Outcome goal ongoing at 01/09/2025 1126   Transfer Goal 1    Activity/Assistive Device sit-to-stand/stand-to-sit, car transfer, walker, front-wheeled at 01/09/2025 1126   [place] modified independence at 01/09/2025 1126   Time Frame by discharge at 01/09/2025 1126   Progress/Outcome goal ongoing at 01/09/2025 1126   Gait Training Goal 1    Activity/Assistive Device gait (walking locomotion), walker, front-wheeled at 01/09/2025 1126   [place] supervision required at 01/09/2025 1126   Distance 50ft at 01/09/2025 1126   Time Frame by discharge at 01/09/2025 1126   Progress/Outcome goal ongoing at 01/09/2025 1126   Stairs Goal 1     Activity/Assistive Device stairs, all skills, using handrail, right at 01/09/2025 1126   [place] minimum assist (75% or more patient effort) at 01/09/2025 1126   Number of Stairs 4 at 01/09/2025 1126   Time Frame by discharge at 01/09/2025 1126   Progress/Outcome goal ongoing at 01/09/2025 1126

## 2025-01-09 NOTE — PROGRESS NOTES
" Pulmonary Daily Progress Note    SUBJECTIVE: Patient seen and examined.  No acute complaints offered today.  S/p LHC yesterday w/ patent coronaries.  Significant ecchymosis surrounding cath site noted overnight.  No additional events noted.  Sating well on 2 liters of low flow O2.  SpO2 of 97% therefore turned down to 1L.    Allergies:   Reglan [metoclopramide hcl], Amoxicillin, Bactrim [sulfamethoxazole-trimethoprim], Formaldehyde, and Latex    Review of Systems:  Review of Systems   Respiratory:  Positive for cough and shortness of breath. Negative for wheezing.    Cardiovascular:  Negative for chest pain.   Gastrointestinal:  Negative for abdominal distention.   Skin:         RUE bruising/swelling   All other systems reviewed and are negative.      Input/Ouput in Last 24 hours:    Intake/Output Summary (Last 24 hours) at 1/9/2025 0905  Last data filed at 1/9/2025 0446  Gross per 24 hour   Intake 651 ml   Output 260 ml   Net 391 ml       Objective     Vital Signs for the last 24 hours:  Visit Vitals  BP (!) 116/58 (BP Location: Left upper arm, Patient Position: Lying)   Pulse 85   Temp 36.5 °C (97.7 °F) (Temporal)   Resp (!) 22   Ht 1.473 m (4' 10\")   Wt 72.6 kg (160 lb 0.9 oz)   SpO2 97%   BMI 33.45 kg/m²     Oxygen Therapy: Supplemental oxygen (2 liters nasal cannula)    Physical Exam:  Physical Exam  Vitals and nursing note reviewed.   Constitutional:       General: She is not in acute distress.     Appearance: She is well-developed. She is ill-appearing.   HENT:      Head: Normocephalic and atraumatic.   Eyes:      General: No scleral icterus.     Pupils: Pupils are equal, round, and reactive to light.   Neck:      Trachea: No tracheal deviation.   Cardiovascular:      Rate and Rhythm: Normal rate. Rhythm irregular.      Heart sounds: Normal heart sounds.   Pulmonary:      Effort: Pulmonary effort is normal.      Breath sounds: Wheezing (Improving) present.   Abdominal:      General: Bowel sounds are normal. "      Palpations: Abdomen is soft.      Tenderness: There is no abdominal tenderness.   Musculoskeletal:         General: No deformity.   Skin:     General: Skin is warm and dry.      Findings: Bruising (RUE ecchymosis from palm to elbow) present.   Neurological:      Mental Status: She is alert.      Comments: Awake and alert          LABS:  Results from last 7 days   Lab Units 01/09/25  0404 01/08/25  0314 01/07/25  0506 01/06/25  0527 01/05/25  0340 01/04/25  1105   SODIUM mEQ/L 136 135* 129* 128* 132* 128*   CO2 mEQ/L 31 28 28 27 27 21   BUN mg/dL 52* 54* 57* 51* 36* 29*   CALCIUM mg/dL 8.3* 8.0* 8.2* 8.5* 8.5* 9.0   ALBUMIN g/dL  --   --   --   --   --  4.3   PHOSPHORUS mg/dL  --   --   --  3.5 4.6  --      Results from last 7 days   Lab Units 01/09/25  0404 01/08/25  0314 01/07/25  0506   WBC K/uL 19.15* 17.98* 15.81*   PLATELETS K/uL 283 276 232     Lab Results   Component Value Date    WBC 19.15 (H) 01/09/2025    HGB 13.7 01/09/2025    HCT 41.5 01/09/2025     01/09/2025    CHOL 100 01/04/2025    TRIG 90 01/04/2025    HDL 36 (L) 01/04/2025    ALT 11 01/04/2025    AST 46 (H) 01/04/2025     01/09/2025    K 5.9 (H) 01/09/2025     01/09/2025    CREATININE 0.9 01/09/2025    BUN 52 (H) 01/09/2025    CO2 31 01/09/2025    INR 1.6 01/09/2025    HGBA1C 7.5 (H) 01/04/2025         Current Facility-Administered Medications:     acetaminophen (TYLENOL) tablet 650 mg, 650 mg, oral, q4h PRN, Juwan Mcdaniel, DO, 650 mg at 01/09/25 0818    aspirin enteric coated tablet 81 mg, 81 mg, oral, Daily, Juwan Mcdaniel DO, 81 mg at 01/09/25 0817    atorvastatin (LIPITOR) tablet 80 mg, 80 mg, oral, Daily (6p), Juwan Mcdaniel, DO, 80 mg at 01/08/25 1722    benzonatate (TESSALON) capsule 100 mg, 100 mg, oral, 3x daily PRN, Juwan Mcdaniel, DO, 100 mg at 01/07/25 0615    glucose chewable tablet 16-32 g of dextrose, 16-32 g of dextrose, oral, PRN **OR** dextrose 40 % oral gel 15-30 g of dextrose, 15-30 g of  dextrose, oral, PRN **OR** glucagon (GLUCAGEN) injection 1 mg, 1 mg, intramuscular, PRN **OR** dextrose 50 % in water (D50) injection 12.5 g, 25 mL, intravenous, PRN, Juwan Mcdaniel E, DO    famotidine (PEPCID) tablet 20 mg, 20 mg, oral, Nightly, Sergey Mcdanielua E, DO, 20 mg at 01/08/25 2102    guaiFENesin (MUCINEX) 12 hr ER tablet 1,200 mg, 1,200 mg, oral, BID, Juwan Mcdaniel E, DO, 1,200 mg at 01/09/25 0817    hydrALAZINE (APRESOLINE) injection 10 mg, 10 mg, intravenous, q6h PRN, Juwan Mcdaniel, DO, 10 mg at 01/08/25 0407    insulin lispro U-100 (HumaLOG) pen 3-5 Units, 3-5 Units, subcutaneous, Before meals & nightly, Juwan Mcdaniel, DO, 3 Units at 01/08/25 1634    insulin lispro U-100 (HumaLOG) pen 5 Units, 5 Units, subcutaneous, TID with meals, Juwan Mcdaniel, DO, 5 Units at 01/09/25 0819    ipratropium-albuteroL (DUO-NEB) 0.5-2.5 mg/3 mL nebulizer solution 3 mL, 3 mL, nebulization, q6h while awake, Juwan Mcdaniel, DO, 3 mL at 01/08/25 1956    methylPREDNISolone sod suc(PF) (SOLU-Medrol) injection 40 mg, 40 mg, intravenous, q12h INT, Juwan Mcdaniel E, DO, 40 mg at 01/09/25 0406    metoprolol succinate XL (TOPROL-XL) 24 hr ER tablet 50 mg, 50 mg, oral, Daily, Juwan Mcdaniel, DO, 50 mg at 01/09/25 0817    montelukast (SINGULAIR) tablet 10 mg, 10 mg, oral, Nightly, Sergey Mcdanielua E, DO, 10 mg at 01/08/25 2101    ondansetron (ZOFRAN) injection 4 mg, 4 mg, intravenous, q6h PRN, Juwan Mcdaniel DO, 4 mg at 01/04/25 1554    polyethylene glycol (MIRALAX) 17 gram packet 17 g, 17 g, oral, Daily, Juwan Mcdaniel DO, 17 g at 01/07/25 1213    sennosides-docusate sodium (SENOKOT-S) 8.6-50 mg per tablet 1 tablet, 1 tablet, oral, Nightly, Juwan Mcdaniel DO, 1 tablet at 01/08/25 2101    sodium zirconium cyclosilicate (LOKELMA) oral powder packet 10 g, 10 g, oral, q8h INT, 10 g at 01/09/25 0521 **FOLLOWED BY** [START ON 1/10/2025] sodium zirconium cyclosilicate (LOKELMA) oral powder packet 10 g, 10 g, oral, Daily,  Jaswant Anderson,     valsartan (DIOVAN) tablet 40 mg, 40 mg, oral, q12h MAGAN, Bailey Drew CRNP, 40 mg at 01/09/25 0817    warfarin (COUMADIN) therapy by pharmacy protocol, , Other, evaluate daily, Bailey Drew CRNP      Imaging/Radiology:  No new chest imaging available to review      IMPRESSION AND PLAN :    Acute respiratory insufficiency w/ hypoxia, improving  -Not oxygen dependent at baseline.  Hypoxic at presentation secondary to RSV infection resulting in an asthma exacerbation  --Chest x-ray from 1/4 w/ clear lung fields  -Sats have been adequate on 2 liters of low flow O2.  Flow reduced to 1L this AM.  Continue efforts to wean supplemental oxygen as tolerated.  Titrate FiO2 to maintain SpO2 >=90%  --Option of BiPAP 15/8 PRN respiratory distress/increased work of breathing  -Encourage incentive spirometer use while hospitalized  -OOB to chair.  Mobilize as tolerated  --PT/OT following     Asthma, acutely exacerbated  -Follows w/ Dr. Mcdaniel.  Last seen 11/22/24.  Spirometry at that time w/ FVC 1.07L (57%), FEV1 0.79L (66%), FEV1/FVC 74% c/w small airways disease and co-existing restriction  --Maintained on high-dose Breo Ellipta, Singulair, and Albuterol PRN  --Started on a Z-krys and a tapering course of Prednisone for an acute flare on 1/2 after telephone consultation w/ Dr. Mcdaniel  -Now w/ severe bronchospasm in the setting of RSV induced asthma exacerbation  -S/p Azithromycin 500mg IV x3 doses, course completing on 1/6  -Mucinex 1,200mg PO BID  -Duonebs q6 hours while awake -> Change to PRN  -Singulair 10mg PO qHS  -Solumedrol 40mg IV q12 hours.  Transition to daily oral dosing tomorrow.  Would plan to taper as follows;  --Prednisone 40mg x3, 30mg x3, 20mg x3, 10mg x3, then STOP  -Out-patient follow up w/ Dr. Mcdaniel upon discharge  --Next scheduled appt is 5/9/25 at 1:15pm.  This can be rescheduled as necessary      RSV infection  -PCR positive on 1/4  -Contact and Droplet precautions per  guidelines  -Supportive care  -Leukocytosis present on labs, suspected to be secondary to steroid induced demargination  --WBC: 15.8 -> 17.9 -> 19.1K      Elevated troponin/NSTEMI  -P/w elevated troponin and ischemic changes on EKG concerning for subendocardial ischemia.  Denies chest pain.  Presentation not c/w ACS.  Troponin bump likely secondary to respiratory distress  --HST 1,645.3 -> 1,848.3 -> 1,603.8  -Cardiology input appreciated  --S/p Heparin gtt x48 hours  --S/p LHC on 1/8.  Findings of angiographically patent epicardial coronary arteries   -2D echo from 1/6 w/ mild cLVH and EF 55%.  A small portion of the the anterolateral wall appears to be markedly hypokinetic.  Mild TR.  RVSP = 63 mmHg. Mildly dilated RA, moderately dilated LA  -C/w ASA & Statin therapies      Atrial fibrillation  -Maintained on Coumadin out-patient.  Rates currently controlled  -S/p Heparin gtt as outlined above.  Was on therapeutic Lovenox for atrial fibrillation as a bridge.  Coumadin administered last evening but again on hold in light of severe RUE ecchymosis  --INR 1.6 today  -Cardiology following  -Monitor telemetry      Obesity  -Body mass index is 30.32 kg/m².  -Complicates medical care.  Diet and exercise w/ a goal of weight loss to a more ideal body weight encouraged     Diet: Regular diet, 1800 carb, 1500mL fluid, cardiac  DVT prophylaxis: Coumadin with INR goal 2-3.  INR 1.6  Therapies: PT/OT  Code status: Full Code     Case d/w: Patient and Nursing     The case was reviewed this AM at multidisciplinary rounds with the patient's nurse, dietician, pharmacist, respiratory therapist, , , and critical care nurse coordinator. The patient's clinical status with regards to diagnosis and treatment plans including disposition of any IV, arterial lines, mccall, and tubes were discussed as well as dietary, respiratory therapy, and mobilization needs. This process required approximately 10 minutes of  coordination of care.

## 2025-01-09 NOTE — ASSESSMENT & PLAN NOTE
- Chads- Vasc at least 5. On Warfarin. Known to Daron Avendaño (last office visit noted in 2021)     - Continue Toprol XL 50mg daily  - Resume Digoxin   - Hold Warfarin until 1/12 for now given right radial cath site as described. No gross bleeding at this time but she has swelling and ecchymosis. Will then resume Warfarin for goal INR 2.0 - 3.0. If her outpatient cardiologist manages this, we will transition management to [facility] on discharge.

## 2025-01-09 NOTE — PROGRESS NOTES
Occupational Therapy -  Initial Evaluation     Patient: Ev Monzon  Location: [facility] Intensive Care Unit 2601  MRN: [id number]  Today's date: 1/9/2025    HISTORY OF PRESENT ILLNESS     Ev is a 79 y.o. female admitted on 1/4/2025 with Obstructive chronic bronchitis with exacerbation (CMS/[facility]) [J44.1]  ACS (acute coronary syndrome) (CMS/HCC) [I24.9]  COPD exacerbation (CMS/HCC) [J44.1]  RSV bronchitis [J20.5]. Principal problem is Obstructive chronic bronchitis with exacerbation (CMS/HCC).    Past Medical History  Ev has a past medical history of Asthma, Atrial fibrillation (CMS/[facility]), COPD (chronic obstructive pulmonary disease) (CMS/[facility]), Diverticulitis, and Type 2 diabetes mellitus (CMS/[facility]).    History of Present Illness   Pt presented to the ED on 1/4 with SOB and wheezing. Pt + for RSV.    1/4 Chest x-Ray: lungs clear, no pleural effusion. + cardiomegaly  1/8: pt underwent L heart cath with coronary angiography  PRIOR LEVEL OF FUNCTION AND LIVING ENVIRONMENT     Prior Level of Function      Flowsheet Row Most Recent Value   Dominant Hand left   Prior Level of Function Comment Pt unable to provide PLOF information 2/2 confusion. Per EMR, pt uses a SPC for ambulation 2/2 knee pain, OT assumes pt is I in ADLs given that pt lives alone, and pt is +    Assistive Device Currently Used at Home cane, straight             Prior Living Environment      Flowsheet Row Most Recent Value   People in Home alone   Current Living Arrangements home   Home Accessibility not wheelchair accessible, stairs to enter home (Group), stairs within home (Group)   Living Environment Comment Pt unable to provide living envorinment details 2/2 confusion. Per EMR, p lives alone in a 2story townhouse with 1STE and ful flight to the 2nd level          Occupational Profile      Flowsheet Row Most Recent Value   Environmental Supports and Barriers unsure, as pt was unable to state. per EMR, pt's sister lives approx  45 min away          VITALS AND PAIN     OT Vitals      Date/Time Pulse Resp SpO2 Pt Activity O2 Therapy O2 Del Method O2 Flow Rate BP MAP BP Location BP Method Pt Position [facility]   01/09/25 1129 80 -- 93 % At rest Supplemental oxygen Nasal cannula 2 L/min 119/65 -- Left upper arm Automatic Lying CMB   01/09/25 1200 92 12 94 % -- -- -- -- 107/59 82 mmHg Left upper arm Automatic Lying TAR   01/09/25 1205 93 -- 98 % At rest Supplemental oxygen Nasal cannula 2 L/min 107/59 -- Left upper arm Automatic Lying CMB          OT Pain      Date/Time Pain Type Side/Orientation Location Rating: Rest Rating: Activity Interventions [facility]   01/09/25 1129 Pain Assessment arm right 0 - no pain 2 - mild pain care clustered CMB             Objective   OBJECTIVE     Start time:  1127  End time:  1207  Session Length: 40 min  Mode of Treatment: co-treatment  Reason for co-treatment: facilitate dc plan    General Observations  Patient received reclined, in bed. She was agreeable to therapy, no issues or concerns identified by nurse prior to session. pt asleep upon therapy arrival. rousable and agreeable to session. pt received on 2L    Precautions: fall, contact, droplet, oxygen therapy device and L/min, modified diet (1500ml flud restriction, RSV)       Limitations/Impairments: safety/cognitive   Services  Do You Speak a Language Other Than English at Home?: no      OT Eval and Treat - 01/09/25 1127          Cognition    Orientation Status oriented to;person     Affect/Mental Status confused;low arousal/lethargic     Follows Commands follows one-step commands;over 90% accuracy;initiation impaired;verbal cues/prompting required;repetition of directions required     Cognitive Function attention deficit;executive function deficit;memory deficit;safety deficit     Attention Deficit focused/sustained attention;requires cues/redirection to task;arousal/alertness     Executive Function Deficit insight/awareness of deficits;information  processing;initiation;organization/sequencing;self-monitoring/self-correction;planning/decision-making;problem-solving/reasoning     Memory Deficit recall, biographical information     Safety Deficit insight into deficits/self-awareness;awareness of need for assistance;problem-solving;safety precautions awareness     Comment, Cognition pt with significant confusion during eval. RN notified        Vision Assessment/Intervention    Vision Assessment unable/difficult to assess   pt stated she wears glasses but unable to state under which circumstances       Hearing Assessment    Hearing Status WFL        Sensory Assessment    Sensory Assessment sensation intact, upper extremities        Upper Extremity Assessment    UE Assessment ROM and Strength WFL except     General Observations RUE  decreased 2/2 edema        Bed Mobility    Bed Mobility Activities supine to sit     [place] minimum assist (75% or more patient effort);1 person assist     Safety/Cues increased time to complete;maximal;verbal cues;tactile cues;initiation;technique;sequencing;hand placement     Assistive Device bed rails;head of bed elevated     Comment Min A needed for trunk elevation        Mobility Belt    Mobility Belt Used During Session yes        Sit/Stand Transfer    Surface edge of bed     [place] minimum assist (75% or more patient effort);1 person assist     Safety/Cues increased time to complete;verbal cues;hand placement;preparatory posture;technique;proper use of assistive device     Assistive Device walker, front-wheeled        Surface-to-Surface Transfers    Transfer Location bed to chair     Transfer Technique stand step     [place] minimum assist (75% or more patient effort);1 person assist     Safety/Cues increased time to complete;verbal cues;technique;proper use of assistive device     Assistive Device walker, front-wheeled        Toilet Transfer    Transfer Technique sit/stand     [place] minimum assist  (75% or more patient effort)     Safety/Cues tactile cues;hand placement;technique;proper use of assistive device     Assistive Device commode, 3-in-1;walker, front-wheeled        Upper Body Dressing    Tasks doff;don;hospital gown     [place] minimum assist (75% or more patient effort)     Safety/Cues maximal;verbal cues;initiation;sequencing     Position supported sitting     Adaptive Equipment none        Lower Body Dressing    Tasks don;socks     [place] dependent (less than 25% patient effort)     Position edge of bed sitting     Adaptive Equipment none     Comment unsafe to have pt attempt given height of bed        Toileting    Tasks adjust/manage clothing;perform bladder hygiene     [place] maximum assist (25-49% patient effort);1 person assist     Safety/Cues attention;initiation;problem-solving     Position supported sitting;supported standing     Adaptive Equipment commode, 3-in-1     Comment pt with decreased initiation and organization of task requiring max cueing and increased assist        Balance    Static Sitting Balance WFL;sitting, edge of bed     Dynamic Sitting Balance mild impairment;supported     Sit to Stand Dynamic Balance mild impairment;supported     Static Standing Balance mild impairment;supported     Dynamic Standing Balance mild impairment;supported     Balance Interventions occupation based/functional task        Impairments/Safety Issues    Impairments Affecting Function balance;endurance/activity tolerance;cognition;range of motion (ROM);strength                                              Education Documentation  Treatment Plan, taught by Cris Cruz OT at 1/9/2025  1:52 PM.  Learner: Patient  Readiness: Acceptance  Method: Explanation  Response: No Evidence of Learning  Comment: OT role and POC, safe transfer technique, fall prevention        Session Outcome  Patient upright, in chair, leg(s) elevated at end of session, chair alarm on, all needs met, call  light in reach, personal items in reach. Nursing notified about patient's performance, patient's position, and patient's response to therapy/activity.    AM-PAC™ - ADL (Current Function)     Putting on/taking off regular lower body clothing 2 - A Lot   Bathing 2 - A Lot   Toileting 2 - A Lot   Putting on/taking off regular upper body clothing 3 - A Little   Help for taking care of personal grooming 3 - A Little   Eating meals 3 - A Little   AM-PAC™ ADL Score 15      ASSESSMENT AND PLAN     Progress Summary  Pt seen in the ICU for OT evaluation. Pt with significant confusion and decreased cognition which impacted pt's safety and functional performance. Pt required continual reorientation, max cues for task initiation, and cueing for task organization/sequencing. Pt with decraesed self awareness. Pt noted to have RUE edema which is impacting pts R hand  strength. Pt also noted to have decreased standing balance and decreased activity tolerance. Pt required Min A for sup>sit transfer, Min A for STS, Min A for commode transfer with pt using RW, Max A for toileting tasks, and Min A for UBD task. RN was notified of pt's cognitive status during eval. OT will continue to work with the pt to maximize her functional independence but anticipates that pt will likely benefit from dc to SNF when medically stable    Patient/Family Therapy Goal Statement: pt unable to state    OT Plan      Flowsheet Row Most Recent Value   Rehab Potential fair, will monitor progress closely at 01/09/2025 1127   Therapy Frequency 3 times/wk at 01/09/2025 1127   Planned Therapy Interventions activity tolerance training, adaptive equipment training, BADL retraining, occupation/activity based interventions, patient/caregiver education/training, cognitive retraining, transfer/mobility retraining, ROM/therapeutic exercise, functional balance retraining at 01/09/2025 1127            OT Discharge Recommendations      Flowsheet Row Most Recent Value    OT Recommended Discharge Disposition skilled nursing facility at 01/09/2025 1127   Anticipated Equipment Needs if Discharged Home (OT) walker, front-wheeled, commode, 3-in-1 at 01/09/2025 1127                 OT Goals      Flowsheet Row Most Recent Value   Bed Mobility Goal 1    Activity/Assistive Device bed mobility activities, all at 01/09/2025 1127   [place] modified independence at 01/09/2025 1127   Time Frame by discharge at 01/09/2025 1127   Progress/Outcome goal ongoing at 01/09/2025 1127   Transfer Goal 1    Activity/Assistive Device toilet at 01/09/2025 1127   [place] supervision required at 01/09/2025 1127   Time Frame by discharge at 01/09/2025 1127   Progress/Outcome goal ongoing at 01/09/2025 1127   Dressing Goal 1    Activity/Adaptive Equipment dressing skills, all at 01/09/2025 1127   [place] supervision required at 01/09/2025 1127   Time Frame by discharge at 01/09/2025 1127   Progress/Outcome goal ongoing at 01/09/2025 1127   Toileting Goal 1    Activity/Assistive Device toileting skills, all at 01/09/2025 1127   [place] supervision required at 01/09/2025 1127   Time Frame by discharge at 01/09/2025 1127   Progress/Outcome goal ongoing at 01/09/2025 1127           98

## 2025-01-09 NOTE — PROGRESS NOTES
Warfarin Dosing by Pharmacy Consult Follow up    Ev Monzon is a 79 y.o. female who has been consulted for warfarin dosing for Atrial fibrillation.    Reviewed relevant clinical data including weight, previous warfarin doses, CBC and PT/INR levels:  PT   Date/Time Value Ref Range Status   01/09/2025 0404 19.1 (H) 12.2 - 14.5 sec Final   01/08/2025 0314 21.7 (H) 12.2 - 14.5 sec Final   01/06/2025 0825 29.5 (H) 12.2 - 14.5 sec Final     INR   Date/Time Value Ref Range Status   01/09/2025 0404 1.6   Final     Comment:     Moderate Intensity Anticoagulation = 2.0 to 3.0, High Intensity = 2.5 to 3.5   01/08/2025 0314 1.9   Final     Comment:     Moderate Intensity Anticoagulation = 2.0 to 3.0, High Intensity = 2.5 to 3.5   01/06/2025 0825 2.9   Final     Comment:     Moderate Intensity Anticoagulation = 2.0 to 3.0, High Intensity = 2.5 to 3.5       CBC Results         01/09/25 01/08/25 01/07/25     0404 0314 0506    WBC 19.15 17.98 15.81    RBC 4.50 4.40 4.10    HGB 13.7 13.4 12.7    HCT 41.5 41.1 37.8    MCV 92.2 93.4 92.2    MCH 30.4 30.5 31.0    MCHC 33.0 32.6 33.6     276 232               Warfarin Administrations (last 96 hours)       Date/Time Action Medication Dose    01/08/25 1722 Given    warfarin (COUMADIN) tablet 3.5 mg 3.5 mg            Vitamin K Administrations (last 96 hours)       None            Assessment/Plan  The patient is ordered warfarin dosing by pharmacy.      Warfarin INR level 1.6 after dose of 3.5 mg PO x1.  INR goal: 2 - 3    Will continue warfarin 3.5 mg PO x1.    Next INR:  1/10/25 @ 06:00    Patient is not receiving concurrent parenteral anticoagulation for bridging purposes.    Pharmacy will continue to follow the patient's warfarin dosing daily during this course of therapy.    Please call INR levels to the pharmacy.  Carmela Zapata, DeuceD

## 2025-01-09 NOTE — PROGRESS NOTES
Hospital Medicine     Daily Progress Note       SUBJECTIVE   Patient seen and evaluated at bedside.  Conscious, oriented in time place and person, noted forgetful at times.  Having some cough and getting breathing treatment this morning during my assessment.  However states that she feels much better.  Does continue to have some oozing of blood from right cath site on movement-       OBJECTIVE   Vital signs in last 24 hours:  Temp:  [36.2 °C (97.1 °F)-36.7 °C (98 °F)] 36.7 °C (98 °F)  Heart Rate:  [75-89] 89  Resp:  [17-32] 22  BP: (116-144)/(56-63) 116/58  FiO2 (%) (Set):  [30 %] 30 %    Intake/Output Summary (Last 24 hours) at 1/9/2025 1033  Last data filed at 1/9/2025 0445  Gross per 24 hour   Intake 651 ml   Output 250 ml   Net 401 ml       Physical Exam  General: appears sick chronically-    HNT: normocephalic, atraumatic, moist mucous membranes    Eyes: Sclera anicteric, EOMI, PERRL    Respiratory : mild wheezing bilaterally    CVS: No JVD, S1 S2 heard, no murmurs, pulse regular rate and rhythm    Abdomen: Soft, non-tender, non-distended, no evidence of free fluid appreciated, bowel sounds noted    Genitourinary: No CVA tenderness, External catheter    Extremities: Significant right hand swelling and bruising noted    Neurologic: Alert/awake/oriented X 3, following verbal commands, grossly no focal neurological deficits appreciated     LINES, DRAINS, AIRWAYS, WOUNDS   Peripheral IV (Adult) 01/04/25 Left Antecubital (Active)   Number of days: 5       Peripheral IV (Adult) 01/04/25 Anterior;Proximal;Right Forearm (Active)   Number of days: 5       External Urinary Catheter Female (one size) (Active)   Number of days: 5       Catheterization Site - Arterial Right Radial 6 Fr. (Active)   Number of days: 1        LABS, IMAGING, TELEMETRY   CHEMISTRIES   Results from last 7 days   Lab Units 01/09/25  0404 01/08/25  0314 01/07/25  0506 01/06/25  0527 01/05/25  0340   SODIUM mEQ/L 136 135* 129* 128* 132*    CHLORIDE mEQ/L 102 103 98 95* 96*   CO2 mEQ/L 31 28 28 27 27   BUN mg/dL 52* 54* 57* 51* 36*   CREATININE mg/dL 0.9 0.9 0.9 0.9 0.9   GLUCOSE mg/dL 145* 187* 224* 242* 193*   CALCIUM mg/dL 8.3* 8.0* 8.2* 8.5* 8.5*   EGFR mL/min/1.73m*2 >60.0 >60.0 >60.0 >60.0 >60.0   ANION GAP mEQ/L 3 4 3 6 9   MAGNESIUM mg/dL 2.2 2.2 2.3 2.4 1.8     CBC RESULTS   Results from last 7 days   Lab Units 01/09/25  0404 01/08/25  0314 01/07/25  0506 01/06/25  0527 01/05/25  0340   WBC K/uL 19.15* 17.98* 15.81* 18.61* 12.81*   HEMOGLOBIN g/dL 13.7 13.4 12.7 13.8 13.7   HEMATOCRIT % 41.5 41.1 37.8 43.3 41.9   PLATELETS K/uL 283 276 232 252 274       Radiology Reports in last 24hCardiac catheterization    Result Date: 1/8/2025  Angiographically patent epicardial coronary arteries RECOMMENDATIONS:  Medical management with risk factor modification         ASSESSMENT AND PLAN     # Acute respiratory insufficiency w/ hypoxia, improving  -Not oxygen dependent at baseline.  2/2 RSV with acute asthma exacerbation  -Currently on 1L 02 via NC  --Option of BiPAP 15/8 PRN respiratory distress/increased work of breathing  -Encourage incentive spirometer use while hospitalized  -OOB to chair  - Pulm following  - continue home: montelukast 10 mg po daily     # Asthma, acutely exacerbated  --Maintained on high-dose Breo Ellipta, Singulair, and Albuterol PRN at home  -S/p Azithromycin 500mg IV x3 doses, course completed on 1/6  -Mucinex 1,200mg PO BID  -Singulair 10mg PO qHS  -IV Solumedrol 40mg IV q12 transitioned to Prednisone 40mg PO daily startring 1/10  -Out-patient follow up w/ Dr. Mcdaniel upon discharge     # RSV infection  -PCR positive on 1/4  -Supportive care  -Leukocytosis present on labs, suspected to be secondary to steroids     # Diabetes mellitus type 2  -Maintained on Januvia out-patient.  Hyperglycemic at presentation, likely due to steroid use PTA  --Humalog 5 units qAC  ---SSI for additional coverage  -HgB A1C of 7.5% as of 1/4/25     #  Elevated troponin  -P/w elevated troponin and ischemic changes on EKG concerning for subendocardial ischemia. Denies chest pain. Presentation not c/w ACS  --HST 1,645.3 -> 1,848.3 -> 1,603.8  -Cardiology input appreciated  --S/p Heparin gtt x48 hours.  -Out-patient Coumadin on hold-resumed 1/8-held 1/9 due to persistent oozing from rt radial site-  - cardiac catheterization 1/8 with Angiographically patent epicardial coronary arteries   -2D echo from 1/6 w/ mild cLVH and EF 55%. A small portion of the the anterolateral wall appears to be markedly hypokinetic. Mild TR. RVSP = 63 mmHg. Mildly dilated RA, moderately dilated LA  -C/w ASA & Statin therapies     # Hyperlipidemia  -Maintained on Atorvastatin out-patient  -FLP from 1/4 w/ Chol 100, Tg 90, HDL 36, LDL 46  -Dosing increased. Continue while hospitalized     # Atrial fibrillation  -Maintained on Coumadin out-patient. Rates currently controlled  -S/p Heparin gtt as outlined above.warfrain held for 48 hrs for persistent oozing from radial site  -Cardiology following     # Hyponatremia: resolved  -Na levels of 128 at presentation, 136 today  -Free water restriction  -Daily BMP     # Hypertension  -Does not appear to have a formal h/o hypertension. BB had been held at presentation. BP noted to be elevated on 1/6 per nursing, ranging 147-200/65-81mmHg  -Hydralazine 10mg IVP q6 hours PRN SBP >160mmHg  -Cardiology following  --Toprol XL 50mg pO qDay  -Monitor BP  - holding: valsartan 40 mg po for hyperkalemia     # Obesity  -Body mass index is 30.32 kg/m².  -Complicates medical care. Diet and exercise w/ a goal of weight loss to a more ideal body weight encouraged     # Hyperkalemia: moderate, uptrending  - potassium: 5.9 (1/9/25) up from 5.2 (1/8/25)  - currently on: sodium zirconium cyclosilicate 10 g po q8h  -holding Valsrtan  -am BMP  - completed: albuterol 5 mg/mL 7.5 mg nebulizer, calcium gluconate 1000 mg iv, insulin regular 2 units sc     # Hypomagnesemia:  acute, resolved     VTE Prophylaxis:  Current anticoagulants:  [Provider Managed Hold] warfarin (COUMADIN) therapy by pharmacy protocol, Other, evaluate daily      Code Status: Full Code        Estimated Discharge Date: 1/13/2025   Disposition Planning: continue current level of care       Sapphire Howard MD  1/9/2025

## 2025-01-09 NOTE — PLAN OF CARE
Problem: Adult Inpatient Plan of Care  Goal: Plan of Care Review  Outcome: Progressing  Flowsheets (Taken 1/9/2025 1352)  Outcome Evaluation: Pt seen in the ICU for OT evaluation. Pt with significant confusion and decreased cognition which impacted pt's safety and functional performance. Pt required continual reorientation, max cues for task initiation, and cueing for task organization/sequencing. Pt with decraesed self awareness. Pt noted to have RUE edema which is impacting pts R hand  strength. Pt also noted to have decreased standing balance and decreased activity tolerance. Pt required Min A for sup>sit transfer, Min A for STS, Min A for commode transfer with pt using RW, Max A for toileting tasks, and Min A for UBD task. RN was notified of pt's cognitive status during eval. OT will continue to work with the pt to maximize her functional independence but anticipates that pt will likely benefit from dc to SNF when medically stable  Plan of Care Reviewed With: patient  Goal: Patient-Specific Goal (Individualized)  Outcome: Progressing  Flowsheets (Taken 1/9/2025 1352)  Patient/Family-Specific Goals (Include Timeframe): pt unable to state goal     Problem: Self-Care Deficit  Goal: Improved Ability to Complete Activities of Daily Living  Outcome: Progressing

## 2025-01-09 NOTE — PLAN OF CARE
Problem: Adult Inpatient Plan of Care  Goal: Plan of Care Review  Outcome: Progressing  Flowsheets (Taken 1/9/2025 0857)  Progress: improving  Plan of Care Reviewed With: patient  Goal: Patient-Specific Goal (Individualized)  Outcome: Progressing  Goal: Absence of Hospital-Acquired Illness or Injury  Outcome: Progressing  Goal: Optimal Comfort and Wellbeing  Outcome: Progressing  Goal: Readiness for Transition of Care  Outcome: Progressing     Problem: Infection  Goal: Absence of Infection Signs and Symptoms  Outcome: Progressing     Problem: Skin Injury Risk Increased  Goal: Skin Health and Integrity  Outcome: Progressing     Problem: Gas Exchange Impaired  Goal: Optimal Gas Exchange  Outcome: Progressing     Problem: Fall Injury Risk  Goal: Absence of Fall and Fall-Related Injury  Outcome: Progressing     Problem: Cardiac Catheterization (Diagnostic/Interventional)  Goal: Stable Heart Rate and Rhythm  Outcome: Progressing  Goal: Absence of Bleeding  Outcome: Progressing  Goal: Absence of Contrast-Induced Injury  Outcome: Progressing  Goal: Absence of Embolism Signs and Symptoms  Outcome: Progressing  Goal: Anesthesia/Sedation Recovery  Outcome: Progressing  Goal: Acceptable Pain Control  Outcome: Progressing  Goal: Absence of Vascular Access Complication  Outcome: Progressing     Problem: Asthma Comorbidity  Goal: Maintenance of Asthma Control  Outcome: Progressing     Problem: COPD (Chronic Obstructive Pulmonary Disease) Comorbidity  Goal: Maintenance of COPD Symptom Control  Outcome: Progressing     Problem: Diabetes Comorbidity  Goal: Blood Glucose Level Within Targeted Range  Outcome: Progressing     Problem: Hypertension Comorbidity  Goal: Blood Pressure in Desired Range  Outcome: Progressing     Problem: Dysrhythmia  Goal: Normalized Cardiac Rhythm  Outcome: Progressing   Plan of Care Review  Plan of Care Reviewed With: patient  Progress: improving

## 2025-01-09 NOTE — HOSPITAL COURSE
Ev is a 79 y.o. female admitted on 1/4/2025 with Obstructive chronic bronchitis with exacerbation (CMS/[facility]) [J44.1]  ACS (acute coronary syndrome) (CMS/[facility]) [I24.9]  COPD exacerbation (CMS/[facility]) [J44.1]  RSV bronchitis [J20.5]. Principal problem is Obstructive chronic bronchitis with exacerbation (CMS/[facility]).    Past Medical History  Ev has a past medical history of Asthma, Atrial fibrillation (CMS/[facility]), COPD (chronic obstructive pulmonary disease) (CMS/[facility]), Diverticulitis, and Type 2 diabetes mellitus (CMS/[facility]).    History of Present Illness   Pt presented to the ED on 1/4 with SOB and wheezing. Pt + for RSV.    1/4 Chest x-Ray: lungs clear, no pleural effusion. + cardiomegaly  1/8: pt underwent L heart cath with coronary angiography

## 2025-01-09 NOTE — PROGRESS NOTES
Inpatient Cardiology   Daily Progress Note       Overview:     - 78 y/o, PMH of afib on Warfarin, asthma/COPD, HTN, DM2 who presented on 1/4 with increasing shortness of breath despite outpatient steroids and Z-krys on 1/2. Admitted for RSV and asthma exacerbation. Cardiology consulted for elevated troponin.   - Transfer to telemetry on 1/7   - [facility] 1/8 with patent coronaries. Therapeutic Lovenox discontinued and Warfarin resumed.   - Patient wishes to transfer cardiovascular care to [facility] given convenience of location --> Follow-up 02/06 with Misty PITTMAN and 04/14 with Dr. Vazquez        Assessment/Plan   Assessment & Plan  Elevated troponin I level  - Negative Lexiscan in 2021  - HST peak of 1800 with ST/T wave abnormalities on admission EKG (new compared to prior 2018 tracings in epic). Denies any obvious s/s of ACS (SOB/wheezing/cough with RSV)   - Echo 1/6/25: Estimated EF 55%. A small portion of the the anterolateral wall appears to be markedly hypokinetic. (EF 60-65% in March 2023 with no noted RWMA)   - [facility] on 1/8 with patent coronaries  - Elevated trop likely acute myocardial injury in setting of hypoxic respiratory failure     - She does not need aspirin  - Decrease Atorvastatin back to 10mg daily as her lipid panel is acceptable   - Continue Toprol XL and Valsartan  - No further cardiovascular evaluation is indicated at this time.   - Holding Warfarin and warm compresses to RUE. Arnica cream over the counter can be used after discharge.     A-fib (CMS/[facility])  - Chads- Vasc at least 5. On Warfarin. Known to Daron Avendaño (last office visit noted in 2021)     - Continue Toprol XL 50mg daily  - Resume Digoxin   - Hold Warfarin until 1/12 for now given right radial cath site as described. No gross bleeding at this time but she has swelling and ecchymosis. Will then resume Warfarin for goal INR 2.0 - 3.0. If her outpatient cardiologist manages this, we will transition management to [facility]  on discharge.   Nonrheumatic aortic valve stenosis  - Echo 1/6/25: Aortic Valve: Tricuspid valve. Sclerotic leaflets. Mild regurgitation. Mild to moderate stenosis. Peak velocity = 2.57 m/s. Mean gradient = 14.00 mmHg. Calculated area by cont eq = 1.44 cm2. Calculated dimensionless index = 0.46. (Mild AS on Echo March 2023 care everywhere)     - Continue routine surveillance   Essential hypertension, benign  - BP improved with adjustment in meds     - Continue Toprol XL   - Hold ARB. K is up to 5.9. If K permits, would resume Valsartan at 80mg daily.   - Follow BMP - discussed hyperkalemia with medicine  - Will adjust regimen if she cannot tolerate ARB     Obstructive chronic bronchitis with exacerbation (CMS/[facility])  - Known to Dr. Mcdaniel as outpatient    - Supplemental 02/bi-pap as respiratory status requires   - Nebs/ steroids / inhalers / antibiotics  - Pulmonary is following  Dyslipidemia  - Continue statin   DM (diabetes mellitus) (CMS/[facility])  - Glucose management per medicine   - Statin/ARB as outlined  RSV (respiratory syncytial virus infection)  - Management per pulm         Subjective   Seen and examined. Having MSK pain secondary to frequent coughing - improved with Tylenol and worse with repositioning. Right hand/FA swollen with ecchymosis after cath - d/w RN - TR band re-applied last night, it was also noted patient removed TR band independently. There had been some oozing but no gross bleeding. Drsg is now dry. Patient denies CP or SOB. Cough is looser today and feeling better. She wants to switch to [employer].       Current Medications:    Scheduled:   aspirin  81 mg oral Daily    atorvastatin  80 mg oral Daily (6p)    famotidine  20 mg oral Nightly    guaiFENesin  1,200 mg oral BID    insulin lispro U-100  3-5 Units subcutaneous Before meals & nightly    insulin lispro U-100  5 Units subcutaneous TID with meals    ipratropium-albuteroL  3 mL nebulization q6h while awake    methylPREDNISolone sodium  succinate  40 mg intravenous q12h INT    metoprolol succinate XL  50 mg oral Daily    montelukast  10 mg oral Nightly    polyethylene glycol  17 g oral Daily    sennosides-docusate sodium  1 tablet oral Nightly    sodium zirconium cyclosilicate  10 g oral q8h INT    Followed by    [START ON 1/10/2025] sodium zirconium cyclosilicate  10 g oral Daily    valsartan  40 mg oral q12h MAGAN       Infusions:      PRN:    acetaminophen    benzonatate    glucose **OR** dextrose **OR** glucagon **OR** dextrose 50 % in water (D50)    hydrALAZINE    ondansetron     Objective   Vital signs in last 24 hours:  Temp:  [36.2 °C (97.1 °F)-36.5 °C (97.7 °F)] 36.5 °C (97.7 °F)  Heart Rate:  [75-86] 85  Resp:  [17-32] 22  BP: (116-144)/(56-63) 116/58  FiO2 (%) (Set):  [30 %] 30 %    Weights (last 5 days)       Date/Time Weight    01/09/25 0638 72.6 kg (160 lb 0.9 oz)    01/08/25 0615 64.8 kg (142 lb 13.7 oz)    01/07/25 0501 68.4 kg (150 lb 12.7 oz)    01/06/25 0844 65.8 kg (145 lb)    01/06/25 0539 66.1 kg (145 lb 11.6 oz)    01/05/25 1038 65.6 kg (144 lb 10 oz)    01/05/25 0537 65.8 kg (145 lb 1 oz)    01/04/25 1056 65.8 kg (145 lb 1.6 oz)            Intake/Output Summary (Last 24 hours) at 1/9/2025 0659  Last data filed at 1/9/2025 0445  24 Hour Net Input/Output from 7AM Yesterday   Intake 661 ml   Output 260 ml   Net 401 ml     Net IO Since Admission: 1,360.09 mL [01/09/25 0852]    Physical Exam  Vitals and nursing note reviewed.   HENT:      Head: Normocephalic.      Mouth/Throat:      Mouth: Mucous membranes are moist.   Eyes:      General: No scleral icterus.  Cardiovascular:      Rate and Rhythm: Normal rate. Rhythm irregular.      Comments: II/VI LEONIDAS at the LSB  Pulmonary:      Effort: Pulmonary effort is normal.      Comments: Exp wheezing  Loose cough today   Nasal canula  Abdominal:      General: Bowel sounds are normal.      Palpations: Abdomen is soft.   Musculoskeletal:      Cervical back: Neck supple.      Comments: Right  UE with swelling fingers to forearm after cath - she has + pulses, fingers are warm, ROM.    Skin:     General: Skin is warm and dry.      Comments: Right radial cath site with ecchymosis fingers to FA, drsg is c/d/I and no evidence of active bleeding   Neurological:      Mental Status: She is oriented to person, place, and time.   Psychiatric:         Mood and Affect: Mood normal.              Labs and Data:      Hematology    Results from last 7 days   Lab Units 01/09/25  0404 01/08/25  0314 01/07/25  0506 01/06/25  0825   WBC K/uL 19.15* 17.98* 15.81*  --    HEMOGLOBIN g/dL 13.7 13.4 12.7  --    HEMATOCRIT % 41.5 41.1 37.8  --    PLATELETS K/uL 283 276 232  --    INR  1.6 1.9  --  2.9       Chemistries    Results from last 7 days   Lab Units 01/09/25  0404 01/08/25  0314 01/07/25  0506 01/04/25  1133 01/04/25  1105   SODIUM mEQ/L 136 135* 129*   < > 128*   POTASSIUM mEQ/L 5.9* 5.2* 5.2*   < > 4.4   CHLORIDE mEQ/L 102 103 98   < > 91*   CREATININE mg/dL 0.9 0.9 0.9   < > 1.0   BUN mg/dL 52* 54* 57*   < > 29*   CO2 mEQ/L 31 28 28   < > 21   GLUCOSE mg/dL 145* 187* 224*   < > 310*   CALCIUM mg/dL 8.3* 8.0* 8.2*   < > 9.0   MAGNESIUM mg/dL 2.2 2.2 2.3   < >  --    ALT IU/L  --   --   --   --  11   AST IU/L  --   --   --   --  46*    < > = values in this interval not displayed.       Biomarkers    Results from last 7 days   Lab Units 01/04/25  1719 01/04/25  1330 01/04/25  1105   HIGH SENSITIVE TROPONIN I pg/mL 1,603.8* 1,848.3* 1,645.3*       Cholesterol    Lab Results   Component Value Date    CHOL 100 01/04/2025    TRIG 90 01/04/2025    HDL 36 (L) 01/04/2025    LDLCALC 46 01/04/2025    NONHDLCALC 64 01/04/2025       Endocrine    Lab Results   Component Value Date    HGBA1C 7.5 (H) 01/04/2025      Radiology:  I have independently reviewed the pertinent imaging from the last 24 hrs.    X-RAY CHEST 1 VIEW    Result Date: 1/4/2025  IMPRESSION: Cardiomegaly       Cardiology:    Telemetry  rate controlled atrial  fibrillation       Cardiac Imaging    TRANSTHORACIC ECHO (TTE) COMPLETE 01/06/2025    Interpretation Summary    Left Ventricle: Normal ventricle size. Mild concentric left ventricular hypertrophy. Low normal systolic function. Estimated EF 55%.  A small portion of the the anterolateral wall appears to be markedly hypokinetic.  The interventricular septum appears to move normally.  The remaining LV wall segments appear to contract normally. Diastolic function: patient in a-fib.    Aortic Valve: Tricuspid valve.  Sclerotic leaflets. Mild regurgitation. Mild to moderate stenosis. Peak velocity = 2.57 m/s. Mean gradient = 14.00 mmHg. Calculated area by cont eq = 1.44 cm2. Calculated dimensionless index = 0.46.    Tricuspid Valve: Structure is grossly normal. Mild regurgitation. Estimated RVSP = 63 mmHg.    Pulmonic Valve: Grossly normal structure. Mild regurgitation.    Right Atrium: Mildly perhaps moderately dilated atrium.    Left Atrium: Moderately dilated atrium.    Pericardium: No evidence of pericardial effusion.    Mitral Valve: Sclerotic mitral valve. Normal leaflet motion. Trace regurgitation.    Right Ventricle: Normal ventricle size. Normal systolic function.    Aorta: Aortic root sclerotic. Sinuses of Valsalva sclerotic. Ascending aorta sclerotic.    IVC/SVC: Inferior vena cava is <2.1cm. Inferior vena cava collapses <50% during inspiration.      Most recent cardiac cath results:    LEFT HEART CATH WITH CORONARY ANGIOGRAPHY 01/08/2025 (Final) 1/8/2025    Conclusion  Angiographically patent epicardial coronary arteries    RECOMMENDATIONS:  Medical management with risk factor modification              ZAIN Goodwin  1/9/2025

## 2025-01-10 LAB
ANION GAP SERPL CALC-SCNC: 7 MEQ/L (ref 3–15)
BASOPHILS # BLD: 0 K/UL (ref 0.01–0.1)
BASOPHILS NFR BLD: 0 %
BUN SERPL-MCNC: 60 MG/DL (ref 7–25)
CALCIUM SERPL-MCNC: 8.2 MG/DL (ref 8.6–10.3)
CHLORIDE SERPL-SCNC: 99 MEQ/L (ref 98–107)
CO2 SERPL-SCNC: 32 MEQ/L (ref 21–31)
CREAT SERPL-MCNC: 1 MG/DL (ref 0.6–1.2)
DIFFERENTIAL METHOD BLD: ABNORMAL
EGFRCR SERPLBLD CKD-EPI 2021: 56.7 ML/MIN/1.73M*2
EOSINOPHIL # BLD: 0 K/UL (ref 0.04–0.36)
EOSINOPHIL NFR BLD: 0 %
ERYTHROCYTE [DISTWIDTH] IN BLOOD BY AUTOMATED COUNT: 13.5 % (ref 11.7–14.4)
GLUCOSE BLD-MCNC: 152 MG/DL (ref 70–99)
GLUCOSE BLD-MCNC: 176 MG/DL (ref 70–99)
GLUCOSE BLD-MCNC: 192 MG/DL (ref 70–99)
GLUCOSE BLD-MCNC: 200 MG/DL (ref 70–99)
GLUCOSE BLD-MCNC: 246 MG/DL (ref 70–99)
GLUCOSE BLD-MCNC: 296 MG/DL (ref 70–99)
GLUCOSE SERPL-MCNC: 160 MG/DL (ref 70–99)
HCT VFR BLD AUTO: 36.2 % (ref 35–45)
HGB BLD-MCNC: 11.8 G/DL (ref 11.8–15.7)
IMM GRANULOCYTES # BLD AUTO: 0.11 K/UL (ref 0–0.08)
IMM GRANULOCYTES NFR BLD AUTO: 0.6 %
INR PPP: 1.9
LYMPHOCYTES # BLD: 1.49 K/UL (ref 1.2–3.5)
LYMPHOCYTES NFR BLD: 8.1 %
MAGNESIUM SERPL-MCNC: 2.1 MG/DL (ref 1.8–2.5)
MCH RBC QN AUTO: 30 PG (ref 28–33.2)
MCHC RBC AUTO-ENTMCNC: 32.6 G/DL (ref 32.2–35.5)
MCV RBC AUTO: 92.1 FL (ref 83–98)
MONOCYTES # BLD: 1.22 K/UL (ref 0.28–0.8)
MONOCYTES NFR BLD: 6.6 %
NEUTROPHILS # BLD: 15.67 K/UL (ref 1.7–7)
NEUTS SEG NFR BLD: 84.7 %
NRBC BLD-RTO: 0 %
PLATELET # BLD AUTO: 266 K/UL (ref 150–369)
PMV BLD AUTO: 10.5 FL (ref 9.4–12.3)
POCT TEST: ABNORMAL
POTASSIUM SERPL-SCNC: 5.4 MEQ/L (ref 3.5–5.1)
PROTHROMBIN TIME: 21.1 SEC (ref 12.2–14.5)
RBC # BLD AUTO: 3.93 M/UL (ref 3.93–5.22)
SODIUM SERPL-SCNC: 138 MEQ/L (ref 136–145)
WBC # BLD AUTO: 18.49 K/UL (ref 3.8–10.5)

## 2025-01-10 PROCEDURE — 36415 COLL VENOUS BLD VENIPUNCTURE: CPT | Performed by: INTERNAL MEDICINE

## 2025-01-10 PROCEDURE — 83735 ASSAY OF MAGNESIUM: CPT | Performed by: INTERNAL MEDICINE

## 2025-01-10 PROCEDURE — 99233 SBSQ HOSP IP/OBS HIGH 50: CPT | Performed by: STUDENT IN AN ORGANIZED HEALTH CARE EDUCATION/TRAINING PROGRAM

## 2025-01-10 PROCEDURE — 99232 SBSQ HOSP IP/OBS MODERATE 35: CPT | Mod: FS | Performed by: INTERNAL MEDICINE

## 2025-01-10 PROCEDURE — 63700000 HC SELF-ADMINISTRABLE DRUG: Performed by: INTERNAL MEDICINE

## 2025-01-10 PROCEDURE — 63700000 HC SELF-ADMINISTRABLE DRUG: Performed by: NURSE PRACTITIONER

## 2025-01-10 PROCEDURE — 80048 BASIC METABOLIC PNL TOTAL CA: CPT | Performed by: INTERNAL MEDICINE

## 2025-01-10 PROCEDURE — 85025 COMPLETE CBC W/AUTO DIFF WBC: CPT | Performed by: INTERNAL MEDICINE

## 2025-01-10 PROCEDURE — 25000000 HC PHARMACY GENERAL: Performed by: HOSPITALIST

## 2025-01-10 PROCEDURE — 97530 THERAPEUTIC ACTIVITIES: CPT | Mod: GP

## 2025-01-10 PROCEDURE — 85610 PROTHROMBIN TIME: CPT | Performed by: NURSE PRACTITIONER

## 2025-01-10 PROCEDURE — 20600000 HC ROOM AND CARE INTERMEDIATE/TELEMETRY

## 2025-01-10 PROCEDURE — 94660 CPAP INITIATION&MGMT: CPT

## 2025-01-10 RX ORDER — AMLODIPINE BESYLATE 2.5 MG/1
2.5 TABLET ORAL DAILY
Status: DISCONTINUED | OUTPATIENT
Start: 2025-01-10 | End: 2025-01-14 | Stop reason: HOSPADM

## 2025-01-10 RX ORDER — IPRATROPIUM BROMIDE AND ALBUTEROL SULFATE 2.5; .5 MG/3ML; MG/3ML
3 SOLUTION RESPIRATORY (INHALATION) EVERY 4 HOURS PRN
Status: CANCELLED
Start: 2025-01-10 | End: 2025-02-09

## 2025-01-10 RX ORDER — GUAIFENESIN 600 MG/1
1200 TABLET, EXTENDED RELEASE ORAL 2 TIMES DAILY
Status: CANCELLED
Start: 2025-01-10 | End: 2025-04-05

## 2025-01-10 RX ORDER — FAMOTIDINE 20 MG/1
20 TABLET, FILM COATED ORAL NIGHTLY
Status: CANCELLED
Start: 2025-01-10 | End: 2025-02-09

## 2025-01-10 RX ORDER — AMOXICILLIN 250 MG
1 CAPSULE ORAL NIGHTLY
Status: CANCELLED
Start: 2025-01-10 | End: 2025-04-07

## 2025-01-10 RX ORDER — BENZONATATE 100 MG/1
100 CAPSULE ORAL 3 TIMES DAILY PRN
Status: CANCELLED
Start: 2025-01-10 | End: 2025-01-20

## 2025-01-10 RX ORDER — POLYETHYLENE GLYCOL 3350 17 G/17G
17 POWDER, FOR SOLUTION ORAL DAILY
Status: CANCELLED
Start: 2025-01-11 | End: 2025-01-14

## 2025-01-10 RX ORDER — AMLODIPINE BESYLATE 2.5 MG/1
2.5 TABLET ORAL DAILY
Status: CANCELLED
Start: 2025-01-11 | End: 2025-02-10

## 2025-01-10 RX ADMIN — ATORVASTATIN CALCIUM 10 MG: 10 TABLET, FILM COATED ORAL at 08:56

## 2025-01-10 RX ADMIN — DIGOXIN 125 MCG: 125 TABLET ORAL at 08:55

## 2025-01-10 RX ADMIN — INSULIN LISPRO 3 UNITS: 100 INJECTION, SOLUTION INTRAVENOUS; SUBCUTANEOUS at 18:09

## 2025-01-10 RX ADMIN — INSULIN LISPRO 3 UNITS: 100 INJECTION, SOLUTION INTRAVENOUS; SUBCUTANEOUS at 12:54

## 2025-01-10 RX ADMIN — MONTELUKAST 10 MG: 10 TABLET, FILM COATED ORAL at 21:36

## 2025-01-10 RX ADMIN — INSULIN LISPRO 5 UNITS: 100 INJECTION, SOLUTION INTRAVENOUS; SUBCUTANEOUS at 09:24

## 2025-01-10 RX ADMIN — INSULIN LISPRO 5 UNITS: 100 INJECTION, SOLUTION INTRAVENOUS; SUBCUTANEOUS at 12:54

## 2025-01-10 RX ADMIN — INSULIN LISPRO 3 UNITS: 100 INJECTION, SOLUTION INTRAVENOUS; SUBCUTANEOUS at 22:20

## 2025-01-10 RX ADMIN — METOPROLOL SUCCINATE ER TABLETS 50 MG: 50 TABLET, FILM COATED, EXTENDED RELEASE ORAL at 08:56

## 2025-01-10 RX ADMIN — INSULIN LISPRO 5 UNITS: 100 INJECTION, SOLUTION INTRAVENOUS; SUBCUTANEOUS at 18:08

## 2025-01-10 RX ADMIN — GUAIFENESIN 1200 MG: 600 TABLET, EXTENDED RELEASE ORAL at 08:55

## 2025-01-10 RX ADMIN — SENNOSIDES AND DOCUSATE SODIUM 1 TABLET: 8.6; 5 TABLET ORAL at 21:36

## 2025-01-10 RX ADMIN — PREDNISONE 40 MG: 20 TABLET ORAL at 08:55

## 2025-01-10 RX ADMIN — SODIUM ZIRCONIUM CYCLOSILICATE 10 G: 10 POWDER, FOR SUSPENSION ORAL at 08:55

## 2025-01-10 RX ADMIN — GUAIFENESIN 1200 MG: 600 TABLET, EXTENDED RELEASE ORAL at 20:15

## 2025-01-10 RX ADMIN — FAMOTIDINE 20 MG: 20 TABLET, FILM COATED ORAL at 21:36

## 2025-01-10 ASSESSMENT — COGNITIVE AND FUNCTIONAL STATUS - GENERAL
MOVING TO AND FROM BED TO CHAIR: 3 - A LITTLE
WALKING IN HOSPITAL ROOM: 2 - A LOT
AFFECT: CONFUSED
STANDING UP FROM CHAIR USING ARMS: 3 - A LITTLE
STANDING UP FROM CHAIR USING ARMS: 3 - A LITTLE
CLIMB 3 TO 5 STEPS WITH RAILING: 2 - A LOT
CLIMB 3 TO 5 STEPS WITH RAILING: 2 - A LOT
MOVING TO AND FROM BED TO CHAIR: 3 - A LITTLE
WALKING IN HOSPITAL ROOM: 2 - A LOT
WALKING IN HOSPITAL ROOM: 2 - A LOT
MOVING TO AND FROM BED TO CHAIR: 3 - A LITTLE
CLIMB 3 TO 5 STEPS WITH RAILING: 2 - A LOT
WALKING IN HOSPITAL ROOM: 2 - A LOT
STANDING UP FROM CHAIR USING ARMS: 2 - A LOT
MOVING TO AND FROM BED TO CHAIR: 3 - A LITTLE
CLIMB 3 TO 5 STEPS WITH RAILING: 2 - A LOT
STANDING UP FROM CHAIR USING ARMS: 2 - A LOT

## 2025-01-10 ASSESSMENT — ENCOUNTER SYMPTOMS
SHORTNESS OF BREATH: 0
WHEEZING: 0
COUGH: 1
ABDOMINAL PAIN: 0

## 2025-01-10 NOTE — PROGRESS NOTES
Inpatient Cardiology   Daily Progress Note       Overview:     - 78 y/o, PMH of afib on Warfarin, asthma/COPD, HTN, DM2 who presented on 1/4 with increasing shortness of breath despite outpatient steroids and Z-krys on 1/2. Admitted for RSV and asthma exacerbation. Cardiology consulted for elevated troponin.   - Transfer to telemetry on 1/7   - [facility] 1/8 with patent coronaries. Therapeutic Lovenox discontinued and Warfarin resumed.   - Warfarin on hold until 1/11 due to RUE ecchymosis/swelling after [facility]     - CV status is stable. No further inpatient evaluation is indicated. Patient wishes to transfer cardiovascular care to [facility] given convenience of location --> Follow-up 02/06 with Misty PITTMAN and 04/14 with Dr. Vazquez. [facility] will also manage Warfarin/INR.      Assessment/Plan   Assessment & Plan  Elevated troponin I level  - Negative Lexiscan in 2021  - HST peak of 1800 with ST/T wave abnormalities on admission EKG (new compared to prior 2018 tracings in epic). Denies any obvious s/s of ACS (SOB/wheezing/cough with RSV)   - Echo 1/6/25: Estimated EF 55%. A small portion of the the anterolateral wall appears to be markedly hypokinetic. (EF 60-65% in March 2023 with no noted RWMA)   - [facility] on 1/8 with patent coronaries  - Elevated trop likely acute myocardial injury in setting of hypoxic respiratory failure     - She does not need aspirin  - Continue Atorvastatin back to 10mg daily as her lipid panel is acceptable   - Continue Toprol XL  - No further cardiovascular evaluation is indicated at this time.   - Holding Warfarin. Can resume 1/11/25.  Warm compresses to RUE. Arnica cream over the counter can be used after discharge.   - Cardiovascular status is stable for PT/OT. SNF recommended on discharge.     A-fib (CMS/[facility])  - Chads- Vasc at least 5. On Warfarin. Known to Daron Avendaño (last office visit noted in 2021)     - Continue Toprol XL 50mg daily  - Continue Digoxin   - Hold Warfarin  until 1/11 for now given right radial cath site as described. No gross bleeding at this time but she has swelling and ecchymosis. Will then resume Warfarin for goal INR 2.0 - 3.0. Will transition Warfarin management to [facility]. She verbalizes understanding.   Nonrheumatic aortic valve stenosis  - Echo 1/6/25: Aortic Valve: Tricuspid valve. Sclerotic leaflets. Mild regurgitation. Mild to moderate stenosis. Peak velocity = 2.57 m/s. Mean gradient = 14.00 mmHg. Calculated area by cont eq = 1.44 cm2. Calculated dimensionless index = 0.46. (Mild AS on Echo March 2023 care everywhere)     - Continue routine surveillance   Essential hypertension, benign  - BP improved with adjustment in meds     - Continue Toprol XL   - Add Amlodipine 2.5mg daily   - Keep off ARB with persistent hyperkalemia. Can re-evaluate as outpatient given history of DM2 as well.   - Follow BMP    Obstructive chronic bronchitis with exacerbation (CMS/[facility])  - Known to Dr. Mcdaniel as outpatient    - Supplemental 02/bi-pap as respiratory status requires   - Nebs/ steroids / inhalers / antibiotics  - Pulmonary is following  Dyslipidemia  - Continue statin   DM (diabetes mellitus) (CMS/[facility])  - Glucose management per medicine   - Statin  - Retrial ARB as outpatient if K permits  RSV (respiratory syncytial virus infection)  - Management per pulm         Subjective   Sitting up in bed. Very talkative. Takes Warfarin 2.5mg on M/W and 1.5mg the rest of the week. Usually is able to do monthly INR checks. Agreeable to CV plan as above and appreciative of transition of care.       Current Medications:    Scheduled:   atorvastatin  10 mg oral Daily    digoxin  125 mcg oral Daily    famotidine  20 mg oral Nightly    guaiFENesin  1,200 mg oral BID    insulin lispro U-100  3-5 Units subcutaneous Before meals & nightly    insulin lispro U-100  5 Units subcutaneous TID with meals    metoprolol succinate XL  50 mg oral Daily    montelukast  10 mg oral Nightly     polyethylene glycol  17 g oral Daily    predniSONE  40 mg oral Daily    sennosides-docusate sodium  1 tablet oral Nightly    sodium zirconium cyclosilicate  10 g oral Daily       Infusions:      PRN:    acetaminophen    benzonatate    glucose **OR** dextrose **OR** glucagon **OR** dextrose 50 % in water (D50)    hydrALAZINE    ipratropium-albuteroL    ondansetron     Objective   Vital signs in last 24 hours:  Temp:  [36.6 °C (97.8 °F)-37.1 °C (98.8 °F)] 37.1 °C (98.8 °F)  Heart Rate:  [74-93] 80  Resp:  [12-22] 15  BP: ()/(50-77) 111/51  FiO2 (%) (Set):  [30 %] 30 %    Weights (last 5 days)       Date/Time Weight    01/09/25 0638 72.6 kg (160 lb 0.9 oz)    01/08/25 0615 64.8 kg (142 lb 13.7 oz)    01/07/25 0501 68.4 kg (150 lb 12.7 oz)    01/06/25 0844 65.8 kg (145 lb)    01/06/25 0539 66.1 kg (145 lb 11.6 oz)    01/05/25 1038 65.6 kg (144 lb 10 oz)    01/05/25 0537 65.8 kg (145 lb 1 oz)            Intake/Output Summary (Last 24 hours) at 1/10/2025 0659  Last data filed at 1/10/2025 0615  24 Hour Net Input/Output from 7AM Yesterday   Intake 480 ml   Output 400 ml   Net 80 ml     Net IO Since Admission: 1,440.09 mL [01/10/25 0931]    Physical Exam  Vitals and nursing note reviewed.   HENT:      Head: Normocephalic.      Mouth/Throat:      Mouth: Mucous membranes are moist.   Eyes:      General: No scleral icterus.  Cardiovascular:      Rate and Rhythm: Normal rate. Rhythm irregular.      Heart sounds: Normal heart sounds.      Comments: II/VI LEONIDAS at the LSB  Pulmonary:      Effort: Pulmonary effort is normal.      Comments: Exp wheezing  Loose cough today   Nasal canula  Abdominal:      General: Bowel sounds are normal.      Palpations: Abdomen is soft.   Musculoskeletal:      Cervical back: Neck supple.      Right lower leg: No edema.      Left lower leg: No edema.      Comments: Right UE with swelling fingers to forearm after cath - she has + pulses, fingers are warm, ROM --> swelling has improved   Skin:      General: Skin is warm and dry.      Comments: Right radial cath site with ecchymosis -- darker today in some areas, no new areas noted, drsg is c/d/I    Neurological:      Mental Status: She is alert and oriented to person, place, and time.   Psychiatric:         Mood and Affect: Mood normal.            Labs and Data:      Hematology    Results from last 7 days   Lab Units 01/10/25  0512 01/09/25  0404 01/08/25  0314   WBC K/uL 18.49* 19.15* 17.98*   HEMOGLOBIN g/dL 11.8 13.7 13.4   HEMATOCRIT % 36.2 41.5 41.1   PLATELETS K/uL 266 283 276   INR  1.9 1.6 1.9       Chemistries    Results from last 7 days   Lab Units 01/10/25  0512 01/09/25  1051 01/09/25  0404 01/08/25  0314 01/04/25  1133 01/04/25  1105   SODIUM mEQ/L 138 134* 136 135*   < > 128*   POTASSIUM mEQ/L 5.4* 5.5* 5.9* 5.2*   < > 4.4   CHLORIDE mEQ/L 99 102 102 103   < > 91*   CREATININE mg/dL 1.0 0.9 0.9 0.9   < > 1.0   BUN mg/dL 60* 56* 52* 54*   < > 29*   CO2 mEQ/L 32* 26 31 28   < > 21   GLUCOSE mg/dL 160* 203* 145* 187*   < > 310*   CALCIUM mg/dL 8.2* 8.1* 8.3* 8.0*   < > 9.0   MAGNESIUM mg/dL 2.1  --  2.2 2.2   < >  --    ALT IU/L  --   --   --   --   --  11   AST IU/L  --   --   --   --   --  46*    < > = values in this interval not displayed.       Biomarkers    Results from last 7 days   Lab Units 01/04/25  1719 01/04/25  1330 01/04/25  1105   HIGH SENSITIVE TROPONIN I pg/mL 1,603.8* 1,848.3* 1,645.3*       Cholesterol    Lab Results   Component Value Date    CHOL 100 01/04/2025    TRIG 90 01/04/2025    HDL 36 (L) 01/04/2025    LDLCALC 46 01/04/2025    NONHDLCALC 64 01/04/2025       Endocrine    Lab Results   Component Value Date    HGBA1C 7.5 (H) 01/04/2025      Radiology:  I have independently reviewed the pertinent imaging from the last 24 hrs.    X-RAY CHEST 1 VIEW    Result Date: 1/4/2025  IMPRESSION: Cardiomegaly       Cardiology:    Telemetry  rate controlled atrial fibrillation       Cardiac Imaging    TRANSTHORACIC ECHO (TTE) COMPLETE  01/06/2025    Interpretation Summary    Left Ventricle: Normal ventricle size. Mild concentric left ventricular hypertrophy. Low normal systolic function. Estimated EF 55%.  A small portion of the the anterolateral wall appears to be markedly hypokinetic.  The interventricular septum appears to move normally.  The remaining LV wall segments appear to contract normally. Diastolic function: patient in a-fib.    Aortic Valve: Tricuspid valve.  Sclerotic leaflets. Mild regurgitation. Mild to moderate stenosis. Peak velocity = 2.57 m/s. Mean gradient = 14.00 mmHg. Calculated area by cont eq = 1.44 cm2. Calculated dimensionless index = 0.46.    Tricuspid Valve: Structure is grossly normal. Mild regurgitation. Estimated RVSP = 63 mmHg.    Pulmonic Valve: Grossly normal structure. Mild regurgitation.    Right Atrium: Mildly perhaps moderately dilated atrium.    Left Atrium: Moderately dilated atrium.    Pericardium: No evidence of pericardial effusion.    Mitral Valve: Sclerotic mitral valve. Normal leaflet motion. Trace regurgitation.    Right Ventricle: Normal ventricle size. Normal systolic function.    Aorta: Aortic root sclerotic. Sinuses of Valsalva sclerotic. Ascending aorta sclerotic.    IVC/SVC: Inferior vena cava is <2.1cm. Inferior vena cava collapses <50% during inspiration.      Most recent cardiac cath results:    LEFT HEART CATH WITH CORONARY ANGIOGRAPHY 01/08/2025 (Final) 1/8/2025    Conclusion  Angiographically patent epicardial coronary arteries    RECOMMENDATIONS:  Medical management with risk factor modification              ZAIN Goodwin  1/10/2025

## 2025-01-10 NOTE — ASSESSMENT & PLAN NOTE
- Chads- Vasc at least 5. On Warfarin. Known to Daron Avendaño (last office visit noted in 2021)     - Continue Toprol XL 50mg daily  - Continue Digoxin   - Hold Warfarin until 1/11 for now given right radial cath site as described. No gross bleeding at this time but she has swelling and ecchymosis. Will then resume Warfarin for goal INR 2.0 - 3.0. Will transition Warfarin management to [facility]. She verbalizes understanding.

## 2025-01-10 NOTE — ASSESSMENT & PLAN NOTE
- BP improved with adjustment in meds     - Continue Toprol XL   - Add Amlodipine 2.5mg daily   - Keep off ARB with persistent hyperkalemia. Can re-evaluate as outpatient given history of DM2 as well.   - Follow BMP

## 2025-01-10 NOTE — PROGRESS NOTES
Physical Therapy -  Daily Treatment/Progress Note     Patient: Ev Monzon  Location: [facility] Intensive Care Unit 2601  MRN: [id number]  Today's date: 1/10/2025    HISTORY OF PRESENT ILLNESS     Ev is a 79 y.o. female admitted on 1/4/2025 with Obstructive chronic bronchitis with exacerbation (CMS/[facility]) [J44.1]  ACS (acute coronary syndrome) (CMS/[facility]) [I24.9]  COPD exacerbation (CMS/[facility]) [J44.1]  RSV bronchitis [J20.5]. Principal problem is Obstructive chronic bronchitis with exacerbation (CMS/[facility]).    Past Medical History  Ev has a past medical history of Asthma, Atrial fibrillation (CMS/[facility]), COPD (chronic obstructive pulmonary disease) (CMS/[facility]), Diverticulitis, and Type 2 diabetes mellitus (CMS/[facility]).    History of Present Illness   Pt presented to the ED on 1/4 with SOB and wheezing. Pt + for RSV.    1/4 Chest x-Ray: lungs clear, no pleural effusion. + cardiomegaly  1/8: pt underwent L heart cath with coronary angiography  PRIOR LEVEL OF FUNCTION AND LIVING ENVIRONMENT     Prior Level of Function      Flowsheet Row Most Recent Value   Dominant Hand left   Prior Level of Function Comment Pt unable to provide PLOF information 2/2 confusion. Per EMR, pt uses a SPC for ambulation 2/2 knee pain, OT assumes pt is I in ADLs given that pt lives alone, and pt is +    Assistive Device Currently Used at Home cane, straight             Prior Living Environment      Flowsheet Row Most Recent Value   People in Home alone   Current Living Arrangements home   Home Accessibility not wheelchair accessible, stairs to enter home (Group), stairs within home (Group)   Living Environment Comment Pt unable to provide living envorinment details 2/2 confusion. Per EMR, p lives alone in a 2story townhouse with 1STE and ful flight to the 2nd level          VITALS AND PAIN     PT Vitals      Date/Time Pulse Resp SpO2 Pt Activity O2 Therapy O2 Del Method O2 Flow Rate BP MAP BP Location BP Method Pt Position Who    01/10/25 1359 88 -- 99 % At rest Supplemental oxygen Nasal cannula 2 L/min 97/48 -- Left upper arm Automatic Lying LAP   01/10/25 1403 86 -- 97 % -- -- -- -- 93/54 -- Left upper arm Automatic Sitting LAP   01/10/25 1406 96 28 90 % -- -- -- -- 102/76 84 mmHg -- -- Standing LAP   01/10/25 1411 -- 22 -- -- -- -- -- -- -- -- -- -- EMM   01/10/25 1411 88 -- 91 % -- -- -- -- 105/55 74 mmHg Left upper arm Automatic Lying LAP          PT Pain      Date/Time Pain Type Rating: Rest Rating: Activity [facility]   01/10/25 1359 Pain Assessment 0 0 LAP             Objective   OBJECTIVE     Start time:  1356  End time:  1412  Session Length: 16 min       General Observations  Patient received reclined, in bed. She was agreeable to therapy, no issues or concerns identified by nurse prior to session. pt awake upon arrival on 2L via NC    Precautions: fall, contact, droplet, oxygen therapy device and L/min, modified diet       Limitations/Impairments: safety/cognitive      PT Eval and Treat - 01/10/25 1356          Cognition    Affect/Mental Status confused        Bed Mobility    Bed Mobility Activities supine to sit;sit to supine     Marcella minimum assist (75% or more patient effort);moderate assist (50-74% patient effort)     Safety/Cues technique;sequencing;proper use of assistive device;initiation;hand placement;preparatory posture     Assistive Device bed rails;head of bed elevated     Comment OOB to R. pull to sit required due to decreased trunk control. further decrease in BP noted however pt denies dizziness. mod A to return supine for both trunk support, BLE management        Mobility Belt    Mobility Belt Used During Session no - medical contraindication     Reason Mobility Belt Not Used flank bruising        Sit/Stand Transfer    Surface edge of bed     Marcella minimum assist (75% or more patient effort)     Safety/Cues sequencing;technique;proper use of assistive device;initiation;hand placement;preparatory posture      Assistive Device walker, front-wheeled     Transfer Comments decreased anterior weight shifting noted. no further BP drop noted in standing however pt reports increased weakness, fatigue. side shuffle to HOB with min A only. pt reports relief of symptoms with return to supine        Balance    Static Sitting Balance WFL     Dynamic Sitting Balance mild impairment     Sit to Stand Dynamic Balance mild impairment     Static Standing Balance mild impairment     Dynamic Standing Balance mild impairment                                                 Session Outcome  Patient upright, leg(s) elevated at end of session, all needs met, call light in reach, personal items in reach, chair alarm on. Nursing notified about change in vital signs, patient's position, patient's performance, and patient's response to therapy/activity.    AM-PAC™ - Mobility (Current Function)     Turning form your back to your side while in flat bed without using bedrails 3 - A Little   Moving from lying on your back to sitting on the side of a flat bed without using bedrails 3 - A Little   Moving to and from a bed to a chair 3 - A Little   Standing up from a chair using your arms 3 - A Little   To walk in a hospital room 2 - A Lot   Climbing 3-5 steps with a railing 2 - A Lot   AM-PAC™ Mobility Score 16      ASSESSMENT AND PLAN     Progress Summary  pt seen for follow up tx session. side shuffle to HOB only as patient reports increased fatigue/weakness. decreased BP noted in supine, seated however no further drop noted in standing. pt denies dizziness however questionable historian. pt requiring min A for mobility however mod A to return supine for both trunk control, BLE management. will continue to rec SNF at [place]    Patient/Family Therapy Goals Statement: Pt unable to clearly state goal during this time    PT Plan      Flowsheet Row Most Recent Value   Rehab Potential good, to achieve stated therapy goals at 01/09/2025 1126   Therapy Frequency  4 times/wk at 01/09/2025 1126   Planned Therapy Interventions balance training, transfer training, bed mobility training, gait training, stair training, strengthening, patient/family education at 01/09/2025 1126            PT Discharge Recommendations      Flowsheet Row Most Recent Value   PT Recommended Discharge Disposition skilled nursing facility at 01/10/2025 1356   Anticipated Equipment Needs if Discharged Home (PT) walker, front-wheeled at 01/10/2025 1356                 PT Goals      Flowsheet Row Most Recent Value   Bed Mobility Goal 1    Activity/Assistive Device bed mobility activities, all at 01/09/2025 1126   [place] independent at 01/09/2025 1126   Time Frame by discharge at 01/09/2025 1126   Progress/Outcome goal ongoing at 01/09/2025 1126   Transfer Goal 1    Activity/Assistive Device sit-to-stand/stand-to-sit, car transfer, walker, front-wheeled at 01/09/2025 1126   [place] modified independence at 01/09/2025 1126   Time Frame by discharge at 01/09/2025 1126   Progress/Outcome goal ongoing at 01/09/2025 1126   Gait Training Goal 1    Activity/Assistive Device gait (walking locomotion), walker, front-wheeled at 01/09/2025 1126   [place] supervision required at 01/09/2025 1126   Distance 50ft at 01/09/2025 1126   Time Frame by discharge at 01/09/2025 1126   Progress/Outcome goal ongoing at 01/09/2025 1126   Stairs Goal 1    Activity/Assistive Device stairs, all skills, using handrail, right at 01/09/2025 1126   [place] minimum assist (75% or more patient effort) at 01/09/2025 1126   Number of Stairs 4 at 01/09/2025 1126   Time Frame by discharge at 01/09/2025 1126   Progress/Outcome goal ongoing at 01/09/2025 1126

## 2025-01-10 NOTE — ASSESSMENT & PLAN NOTE
- Negative Lexiscan in 2021  - HST peak of 1800 with ST/T wave abnormalities on admission EKG (new compared to prior 2018 tracings in epic). Denies any obvious s/s of ACS (SOB/wheezing/cough with RSV)   - Echo 1/6/25: Estimated EF 55%. A small portion of the the anterolateral wall appears to be markedly hypokinetic. (EF 60-65% in March 2023 with no noted RWMA)   - LHC on 1/8 with patent coronaries  - Elevated trop likely acute myocardial injury in setting of hypoxic respiratory failure     - She does not need aspirin  - Continue Atorvastatin back to 10mg daily as her lipid panel is acceptable   - Continue Toprol XL  - No further cardiovascular evaluation is indicated at this time.   - Holding Warfarin. Can resume 1/11/25.  Warm compresses to RUE. Arnica cream over the counter can be used after discharge.   - Cardiovascular status is stable for PT/OT. SNF recommended on discharge.

## 2025-01-10 NOTE — NURSING NOTE
Recvd pt  at 1200, AAO x3, Confused at times-able to reorient, VSS, 2L, Afib, Bipap HS PRN,  labs WNL, lokelma for K, IVABT, + RSV, PT/OT, Large r arm hemtoma post cath site, Alsoo noted BL ribs hematoma-Alerted [facility]MS and charted, Per RN report-they were there and pt confirms this. Monitoring. Pt asymptomatic, Report given to 5PAV-need to monitor hematomas.

## 2025-01-10 NOTE — PROGRESS NOTES
" Pulmonary Daily Progress Note    SUBJECTIVE: Patient seen and examined.  No acute complaints offered today.  Feels much improved, reporting feeling 99% better since her presentation.  Reportedly had some increased work of breathing overnight for which she was treated w/ Lasix and BiPAP.  ABG was without acute issues.  Remains on 2L of low flow O2 w/ SpO2 of % during my evaluation.    Allergies:   Reglan [metoclopramide hcl], Amoxicillin, Bactrim [sulfamethoxazole-trimethoprim], Formaldehyde, and Latex    Review of Systems:  Review of Systems   Respiratory:  Positive for cough. Negative for shortness of breath and wheezing.    Cardiovascular:  Negative for chest pain.   Gastrointestinal:  Negative for abdominal pain.   All other systems reviewed and are negative.      Input/Ouput in Last 24 hours:    Intake/Output Summary (Last 24 hours) at 1/10/2025 0934  Last data filed at 1/10/2025 0615  Gross per 24 hour   Intake 360 ml   Output 200 ml   Net 160 ml       Objective     Vital Signs for the last 24 hours:  Visit Vitals  BP (!) 111/51   Pulse 80   Temp 37.1 °C (98.8 °F) (Temporal)   Resp 15   Ht 1.473 m (4' 10\")   Wt 72.6 kg (160 lb 0.9 oz)   SpO2 100%   BMI 33.45 kg/m²     Oxygen Therapy: Supplemental oxygen (2 liters nasal cannula)    Physical Exam:  Physical Exam  Vitals and nursing note reviewed.   Constitutional:       General: She is not in acute distress.     Appearance: She is well-developed. She is not ill-appearing.   HENT:      Head: Normocephalic and atraumatic.   Eyes:      General: No scleral icterus.     Pupils: Pupils are equal, round, and reactive to light.   Neck:      Trachea: No tracheal deviation.   Cardiovascular:      Rate and Rhythm: Normal rate. Rhythm irregular.      Heart sounds: Normal heart sounds.   Pulmonary:      Effort: Pulmonary effort is normal.      Breath sounds: Wheezing (Much improved) present.   Abdominal:      General: Bowel sounds are normal.      Palpations: Abdomen " is soft.      Tenderness: There is no abdominal tenderness.   Musculoskeletal:         General: No deformity.   Skin:     General: Skin is warm and dry.      Findings: Bruising (Right hand and forearm) present.   Neurological:      Mental Status: She is alert and oriented to person, place, and time. Mental status is at baseline.          LABS:  Results from last 7 days   Lab Units 01/10/25  0512 01/09/25  1051 01/09/25  0404 01/07/25  0506 01/06/25  0527 01/05/25  0340 01/04/25  1105   SODIUM mEQ/L 138 134* 136   < > 128* 132* 128*   CO2 mEQ/L 32* 26 31   < > 27 27 21   BUN mg/dL 60* 56* 52*   < > 51* 36* 29*   CALCIUM mg/dL 8.2* 8.1* 8.3*   < > 8.5* 8.5* 9.0   ALBUMIN g/dL  --   --   --   --   --   --  4.3   PHOSPHORUS mg/dL  --   --   --   --  3.5 4.6  --     < > = values in this interval not displayed.     Results from last 7 days   Lab Units 01/10/25  0512 01/09/25  0404 01/08/25  0314   WBC K/uL 18.49* 19.15* 17.98*   PLATELETS K/uL 266 283 276     Lab Results   Component Value Date    WBC 18.49 (H) 01/10/2025    HGB 11.8 01/10/2025    HCT 36.2 01/10/2025     01/10/2025    CHOL 100 01/04/2025    TRIG 90 01/04/2025    HDL 36 (L) 01/04/2025    ALT 11 01/04/2025    AST 46 (H) 01/04/2025     01/10/2025    K 5.4 (H) 01/10/2025    CL 99 01/10/2025    CREATININE 1.0 01/10/2025    BUN 60 (H) 01/10/2025    CO2 32 (H) 01/10/2025    INR 1.9 01/10/2025    HGBA1C 7.5 (H) 01/04/2025         Current Facility-Administered Medications:     acetaminophen (TYLENOL) tablet 650 mg, 650 mg, oral, q4h PRN, Juwan Mcdaniel DO, 650 mg at 01/09/25 0818    atorvastatin (LIPITOR) tablet 10 mg, 10 mg, oral, Daily, Cilia, OBIE JuárezNP, 10 mg at 01/10/25 0856    benzonatate (TESSALON) capsule 100 mg, 100 mg, oral, 3x daily PRN, Juwan Mcdaniel DO, 100 mg at 01/07/25 0615    glucose chewable tablet 16-32 g of dextrose, 16-32 g of dextrose, oral, PRN **OR** dextrose 40 % oral gel 15-30 g of dextrose, 15-30 g of dextrose, oral,  PRN **OR** glucagon (GLUCAGEN) injection 1 mg, 1 mg, intramuscular, PRN **OR** dextrose 50 % in water (D50) injection 12.5 g, 25 mL, intravenous, PRN, Juwan Mcdaniel E, DO    digoxin (LANOXIN) tablet 125 mcg, 125 mcg, oral, Daily, Cilia, Bailey A, CRNP, 125 mcg at 01/10/25 0855    famotidine (PEPCID) tablet 20 mg, 20 mg, oral, Nightly, Juwan Mcdaniel, DO, 20 mg at 01/09/25 2041    guaiFENesin (MUCINEX) 12 hr ER tablet 1,200 mg, 1,200 mg, oral, BID, Juwan Mcdaniel, DO, 1,200 mg at 01/10/25 0855    hydrALAZINE (APRESOLINE) injection 10 mg, 10 mg, intravenous, q6h PRN, Juwan Mcdaniel E, DO, 10 mg at 01/08/25 0407    insulin lispro U-100 (HumaLOG) pen 3-5 Units, 3-5 Units, subcutaneous, Before meals & nightly, Juwan Mcdaniel, DO, 3 Units at 01/09/25 1623    insulin lispro U-100 (HumaLOG) pen 5 Units, 5 Units, subcutaneous, TID with meals, Juwan Mcdaniel, DO, 5 Units at 01/10/25 0924    ipratropium-albuteroL (DUO-NEB) 0.5-2.5 mg/3 mL nebulizer solution 3 mL, 3 mL, nebulization, q4h PRN, Juwan Mcdaniel, DO, 3 mL at 01/09/25 2119    metoprolol succinate XL (TOPROL-XL) 24 hr ER tablet 50 mg, 50 mg, oral, Daily, Juwan Mcdaniel, DO, 50 mg at 01/10/25 0856    montelukast (SINGULAIR) tablet 10 mg, 10 mg, oral, Nightly, Sergey Mcdanielua E, DO, 10 mg at 01/09/25 2041    ondansetron (ZOFRAN) injection 4 mg, 4 mg, intravenous, q6h PRN, Juwan Mcdaniel, DO, 4 mg at 01/04/25 1554    polyethylene glycol (MIRALAX) 17 gram packet 17 g, 17 g, oral, Daily, Juwan Mcdaniel, DO, 17 g at 01/07/25 1213    predniSONE (DELTASONE) tablet 40 mg, 40 mg, oral, Daily, Juwan Mcdaniel, , 40 mg at 01/10/25 0855    sennosides-docusate sodium (SENOKOT-S) 8.6-50 mg per tablet 1 tablet, 1 tablet, oral, Nightly, Juwan Mcdaniel DO, 1 tablet at 01/09/25 2041    [COMPLETED] sodium zirconium cyclosilicate (LOKELMA) oral powder packet 10 g, 10 g, oral, q8h INT, 10 g at 01/09/25 2041 **FOLLOWED BY** sodium zirconium cyclosilicate (LOKELMA) oral  powder packet 10 g, 10 g, oral, Daily, Jaswant Anderson, DO, 10 g at 01/10/25 0855    [Provider Managed Hold] warfarin (COUMADIN) therapy by pharmacy protocol, , Other, evaluate daily, Bailey Drew CRNP      Imaging/Radiology:  No new chest imaging available to review      IMPRESSION AND PLAN :    Acute respiratory insufficiency w/ hypoxia, improving  -Not oxygen dependent at baseline.  Hypoxic at presentation secondary to RSV infection resulting in an asthma exacerbation  --Chest x-ray from 1/4 w/ clear lung fields  -Sats remain adequate on 2 liters of low flow O2.  Continue efforts to wean supplemental oxygen as tolerated.  Titrate FiO2 to maintain SpO2 >=90%  --Option of BiPAP 15/8 PRN respiratory distress/increased work of breathing  -Encourage incentive spirometer use while hospitalized  -OOB to chair.  Mobilize as tolerated  --PT/OT following  -Anticipate that she will need an O2 evaluation if discharge is to home though she might improve     Asthma, acutely exacerbated  -Follows w/ Dr. Mcdaniel.  Last seen 11/22/24.  Spirometry at that time w/ FVC 1.07L (57%), FEV1 0.79L (66%), FEV1/FVC 74% c/w small airways disease and co-existing restriction  --Maintained on high-dose Breo Ellipta, Singulair, and Albuterol PRN  --Started on a Z-krys and a tapering course of Prednisone for an acute flare on 1/2 after telephone consultation w/ Dr. Mcdaniel  -P/w severe bronchospasm in the setting of RSV induced asthma exacerbation  -S/p Azithromycin 500mg IV x3 doses, course completing on 1/6  -Mucinex 1,200mg PO BID  -Duonebs q4 hours PRN  -Singulair 10mg PO qHS  -Prednisone 40mg PO qDay. Continue to taper as follows;  --Prednisone 40mg x3, 30mg x3, 20mg x3, 10mg x3, then STOP  -Out-patient follow up w/ Dr. Mcdaniel upon discharge  --Next scheduled appt is 5/9/25 at 1:15pm.  This can be rescheduled as necessary      RSV infection  -PCR positive on 1/4  -Contact and Droplet precautions per guidelines  -Supportive care  -Leukocytosis  present on labs, suspected to be secondary to steroid induced demargination  --WBC: 15.8 -> 17.9 -> 19.1 -> 18.4K      Elevated troponin/NSTEMI  -P/w elevated troponin and ischemic changes on EKG concerning for subendocardial ischemia.  Denies chest pain.  Presentation not c/w ACS.  Troponin bump likely secondary to respiratory distress  --HST 1,645.3 -> 1,848.3 -> 1,603.8  -Cardiology input appreciated  --S/p Heparin gtt x48 hours  --S/p LHC on 1/8.  Findings of angiographically patent epicardial coronary arteries   -2D echo from 1/6 w/ mild cLVH and EF 55%.  A small portion of the the anterolateral wall appears to be markedly hypokinetic.  Mild TR.  RVSP = 63 mmHg. Mildly dilated RA, moderately dilated LA  -C/w ASA & Statin therapies      Atrial fibrillation  -Maintained on Coumadin out-patient.  Rates currently controlled  -S/p Heparin gtt as outlined above.  Was on therapeutic Lovenox for atrial fibrillation as a bridge.  Coumadin administered in PM of 1/8 but again on hold in light of severe RUE ecchymosis  --INR 1.9 today.  Resume Coumadin when cleared by cardiology  -Cardiology following  -Monitor telemetry      Obesity  -Body mass index is 30.32 kg/m².  -Complicates medical care.  Diet and exercise w/ a goal of weight loss to a more ideal body weight encouraged    Stable for discharge from a pulmonary standpoint when otherwise medically ready      Diet: Regular diet, 1800 carb, 1500mL fluid, cardiac  DVT prophylaxis: Coumadin with INR goal 2-3.  INR 1.9  Therapies: PT/OT  Code status: Full Code     Case d/w: Patient, Nursing, Respiratory therapy, and Bailey Rajendra     The case was reviewed this AM at multidisciplinary rounds with the patient's nurse, dietician, pharmacist, respiratory therapist, , , and critical care nurse coordinator. The patient's clinical status with regards to diagnosis and treatment plans including disposition of any IV, arterial lines, mccall, and tubes were  discussed as well as dietary, respiratory therapy, and mobilization needs. This process required approximately 10 minutes of coordination of care.

## 2025-01-10 NOTE — PLAN OF CARE
Care Coordination Discharge Plan Note     Discharge Needs Assessment  Concerns to be Addressed: care coordination/care conferences  Current Discharge Risk: chronically ill, cognitively impaired    Anticipated Discharge Plan  Anticipated Discharge Disposition: skilled nursing facility  Type of Skilled Nursing Care Services: OT, PT, nursing      Patient Choice  Offered/Gave Vendor List: no  Patient's Choice of Community Agency(s):           ---------------------------------------------------------------------------------------------------------------------    Interdisciplinary Discharge Plan Review:  Participants:     Concerns Comments: Spoke with pt's sister regarding lack of response from SNF's in her area. She is agreeable to [facility] referrals in [place]. She would like to include [facility]. Did send referrals & [facility] does not have any iso beds at the moment. Will await SNF responses.    Discharge Plan:   Disposition/Destination:   /    Discharge Facility:    Community Resources:      Discharge Transportation:  Is Out of Hospital DNR needed at Discharge: no  Does patient need discharge transport? No

## 2025-01-10 NOTE — PROGRESS NOTES
Hospital Medicine     Daily Progress Note       SUBJECTIVE   Interval History:     I evaluated this patient at bedside this morning. The patient is conscious, oriented in time, place and person.  Was informed by the nursing in the evening yesterday that the patient was more confused, however improved after reorientation.  Vitals were stable at that time.  Overnight had increased work of breathing for which she received Lasix and BiPAP.  States that she feels much better this morning.  Medically cleared for discharge by cardiology and pulmonology.  No acute events reported overnight  Other review of system is negative.   OBJECTIVE      Vital signs in last 24 hours:  Temp:  [36.6 °C (97.8 °F)-37.1 °C (98.8 °F)] 37.1 °C (98.8 °F)  Heart Rate:  [69-83] 69  Resp:  [15-29] 18  BP: ()/(49-77) 109/53  FiO2 (%) (Set):  [30 %] 30 %    Intake/Output Summary (Last 24 hours) at 1/10/2025 1241  Last data filed at 1/10/2025 0615  Gross per 24 hour   Intake 360 ml   Output 200 ml   Net 160 ml       PHYSICAL EXAMINATION          General: No apparent distress, appears stated age,obese    HNT: Normocephalic, atraumatic, moist mucous membranes    Eyes: Sclera anicteric, EOMI, PERRL    Respiratory: mild wheezing bilaterally, on 2L o2 via NC    CVS: No JVD, S1 S2 heard, no murmurs, pulse regular rate and rhythm    Abdomen: Soft, non-tender, non-distended, no evidence of free fluid appreciated, bowel sounds noted    Genitourinary: No CVA tenderness, external catheter     Extremities: Right hand swelling and bruising noted, not oozing anymore.    Neurologic: Alert/awake/oriented X 3, following verbal commands, grossly no focal neurological deficits appreciated                LINES, DRAINS, AIRWAYS, WOUNDS   Peripheral IV (Adult) 01/04/25 Left Antecubital (Active)   Number of days: 6       External Urinary Catheter Female (one size) (Active)   Number of days: 6       Catheterization Site - Arterial Right Radial 6 Fr.  (Active)   Number of days: 2          LABS, IMAGING, TELEMETRY   CHEMISTRIES   Results from last 7 days   Lab Units 01/10/25  0512 01/09/25  1051 01/09/25  0404 01/08/25  0314 01/07/25  0506   SODIUM mEQ/L 138 134* 136 135* 129*   CHLORIDE mEQ/L 99 102 102 103 98   CO2 mEQ/L 32* 26 31 28 28   BUN mg/dL 60* 56* 52* 54* 57*   CREATININE mg/dL 1.0 0.9 0.9 0.9 0.9   GLUCOSE mg/dL 160* 203* 145* 187* 224*   CALCIUM mg/dL 8.2* 8.1* 8.3* 8.0* 8.2*   EGFR mL/min/1.73m*2 56.7* >60.0 >60.0 >60.0 >60.0   ANION GAP mEQ/L 7 6 3 4 3   MAGNESIUM mg/dL 2.1  --  2.2 2.2 2.3     CBC RESULTS   Results from last 7 days   Lab Units 01/10/25  0512 01/09/25  0404 01/08/25  0314 01/07/25  0506 01/06/25  0527   WBC K/uL 18.49* 19.15* 17.98* 15.81* 18.61*   HEMOGLOBIN g/dL 11.8 13.7 13.4 12.7 13.8   HEMATOCRIT % 36.2 41.5 41.1 37.8 43.3   PLATELETS K/uL 266 283 276 232 252       Radiology Reports in last 24hNo results found.     ASSESSMENT AND PLAN     # Acute respiratory insufficiency w/ hypoxia, improving  -Not oxygen dependent at baseline.  2/2 RSV with acute asthma exacerbation  -Currently on 2L 02 via NC  --Option of BiPAP 15/8 PRN respiratory distress/increased work of breathing  -Encourage incentive spirometer use while hospitalized  -OOB to chair  - Pulm following-cleared for DC  - continue home: montelukast 10 mg po daily     # Asthma, acutely exacerbated  --Maintained on high-dose Breo Ellipta, Singulair, and Albuterol PRN at home  -S/p Azithromycin 500mg IV x3 doses, course completed on 1/6  -Mucinex 1,200mg PO BID  -Singulair 10mg PO qHS  -IV Solumedrol 40mg IV q12 transitioned to Prednisone 40mg PO daily startring 1/10-wean by 10mg every 3 days  -Out-patient follow up w/ Dr. Mcdaniel upon discharge     # RSV infection  -PCR positive on 1/4  -Supportive care  -Leukocytosis present on labs, suspected to be secondary to steroids     # Diabetes mellitus type 2  -Maintained on Januvia out-patient.  Hyperglycemic at presentation, likely  due to steroid use PTA  --Humalog 5 units qAC  ---SSI for additional coverage  -HgB A1C of 7.5% as of 1/4/25  - holding home: metFORMIN tablet po, sitagliptin 100 mg po     # Elevated troponin  -P/w elevated troponin and ischemic changes on EKG concerning for subendocardial ischemia. Denies chest pain. Presentation not c/w ACS  --HST 1,645.3 -> 1,848.3 -> 1,603.8  -Cardiology input appreciated  --S/p Heparin gtt x48 hours.  -Out-patient Coumadin on hold-resumed 1/8-held 1/9 due to persistent oozing from rt radial site-can be resumed 1/11 per cardio   - cardiac catheterization 1/8 with Angiographically patent epicardial coronary arteries  -2D echo from 1/6 w/ mild cLVH and EF 55%. A small portion of the the anterolateral wall appears to be markedly hypokinetic. Mild TR. RVSP = 63 mmHg. Mildly dilated RA, moderately dilated LA  -C/w ASA & Statin therapies     # Hyperlipidemia  -Maintained on Atorvastatin out-patient  -FLP from 1/4 w/ Chol 100, Tg 90, HDL 36, LDL 46  -Dosing increased. Continue while hospitalized     # Atrial fibrillation  -Maintained on Coumadin out-patient. Rates currently controlled  -S/p Heparin gtt as outlined above.warfrain held for 48 hrs for persistent oozing from radial site  -Cardiology following     # Hyponatremia: mild, resolved  -Na levels of 128 at presentation, 138 today  -Free water restriction  -Daily BMP     # Hypertension  -Does not appear to have a formal h/o hypertension. BB had been held at presentation. BP noted to be elevated on 1/6 per nursing, ranging 147-200/65-81mmHg  -Hydralazine 10mg IVP q6 hours PRN SBP >160mmHg  -Cardiology following  --Toprol XL 50mg pO qDay  -Monitor BP  - discontinued: valsartan 40 mg po, nicardipine, nitroglycerin     # Obesity  -Body mass index is 30.32 kg/m².  -Complicates medical care. Diet and exercise w/ a goal of weight loss to a more ideal body weight encouraged     # Hyperkalemia: moderate, interval uptrending, overall downtrending  -  potassium: 5.4 (1/10/25) up from 4.9, high 5.9 (1/9/25)  -holding Valsrtan  -am BMP  - currently on: sodium zirconium cyclosilicate 10 g po daily     # Hypomagnesemia: acute, resolved  resolved       VTE Prophylaxis:  Current anticoagulants:  [Provider Managed Hold] warfarin (COUMADIN) therapy by pharmacy protocol, Other, evaluate daily      Code Status: Full Code        Estimated Discharge Date: 1/13/2025   Disposition Planning: SNF per PT/OT-awaiting placement       Sapphire Howard MD  1/10/2025

## 2025-01-10 NOTE — DISCHARGE INSTRUCTIONS
You were treated for an asthma exacerbation in the setting of an acute RSV infection    Complete the steroid taper as prescribed    Follow up with pulmonology at your scheduled visit    Patient wishes to transfer cardiovascular care to [facility] given convenience of location --> Follow-up 02/06 with Misty PITTMAN and 04/14 with Dr. Vazquez. [facility] will also manage Warfarin/INR.     Follow up with your primary care doctor within 1 week of discharge from rehab

## 2025-01-11 LAB
ANION GAP SERPL CALC-SCNC: 6 MEQ/L (ref 3–15)
BASOPHILS # BLD: 0.01 K/UL (ref 0.01–0.1)
BASOPHILS NFR BLD: 0.1 %
BUN SERPL-MCNC: 63 MG/DL (ref 7–25)
CALCIUM SERPL-MCNC: 8.2 MG/DL (ref 8.6–10.3)
CHLORIDE SERPL-SCNC: 102 MEQ/L (ref 98–107)
CO2 SERPL-SCNC: 33 MEQ/L (ref 21–31)
CREAT SERPL-MCNC: 0.9 MG/DL (ref 0.6–1.2)
DIFFERENTIAL METHOD BLD: ABNORMAL
EGFRCR SERPLBLD CKD-EPI 2021: >60 ML/MIN/1.73M*2
EOSINOPHIL # BLD: 0.04 K/UL (ref 0.04–0.36)
EOSINOPHIL NFR BLD: 0.2 %
ERYTHROCYTE [DISTWIDTH] IN BLOOD BY AUTOMATED COUNT: 13.2 % (ref 11.7–14.4)
GLUCOSE BLD-MCNC: 119 MG/DL (ref 70–99)
GLUCOSE BLD-MCNC: 162 MG/DL (ref 70–99)
GLUCOSE BLD-MCNC: 228 MG/DL (ref 70–99)
GLUCOSE BLD-MCNC: 262 MG/DL (ref 70–99)
GLUCOSE SERPL-MCNC: 102 MG/DL (ref 70–99)
HCT VFR BLD AUTO: 31.5 % (ref 35–45)
HGB BLD-MCNC: 10.3 G/DL (ref 11.8–15.7)
IMM GRANULOCYTES # BLD AUTO: 0.1 K/UL (ref 0–0.08)
IMM GRANULOCYTES NFR BLD AUTO: 0.6 %
INR PPP: 1.7
LYMPHOCYTES # BLD: 2.22 K/UL (ref 1.2–3.5)
LYMPHOCYTES NFR BLD: 13 %
MAGNESIUM SERPL-MCNC: 2.1 MG/DL (ref 1.8–2.5)
MCH RBC QN AUTO: 30.2 PG (ref 28–33.2)
MCHC RBC AUTO-ENTMCNC: 32.7 G/DL (ref 32.2–35.5)
MCV RBC AUTO: 92.4 FL (ref 83–98)
MONOCYTES # BLD: 1.35 K/UL (ref 0.28–0.8)
MONOCYTES NFR BLD: 7.9 %
NEUTROPHILS # BLD: 13.32 K/UL (ref 1.7–7)
NEUTS SEG NFR BLD: 78.2 %
NRBC BLD-RTO: 0 %
PLATELET # BLD AUTO: 247 K/UL (ref 150–369)
PMV BLD AUTO: 10.8 FL (ref 9.4–12.3)
POCT TEST: ABNORMAL
POTASSIUM SERPL-SCNC: 4.4 MEQ/L (ref 3.5–5.1)
PROTHROMBIN TIME: 19.4 SEC (ref 12.2–14.5)
RBC # BLD AUTO: 3.41 M/UL (ref 3.93–5.22)
SODIUM SERPL-SCNC: 141 MEQ/L (ref 136–145)
WBC # BLD AUTO: 17.04 K/UL (ref 3.8–10.5)

## 2025-01-11 PROCEDURE — 85025 COMPLETE CBC W/AUTO DIFF WBC: CPT | Performed by: INTERNAL MEDICINE

## 2025-01-11 PROCEDURE — 83735 ASSAY OF MAGNESIUM: CPT | Performed by: INTERNAL MEDICINE

## 2025-01-11 PROCEDURE — 63700000 HC SELF-ADMINISTRABLE DRUG: Performed by: INTERNAL MEDICINE

## 2025-01-11 PROCEDURE — 36415 COLL VENOUS BLD VENIPUNCTURE: CPT | Performed by: NURSE PRACTITIONER

## 2025-01-11 PROCEDURE — 80048 BASIC METABOLIC PNL TOTAL CA: CPT | Performed by: INTERNAL MEDICINE

## 2025-01-11 PROCEDURE — 20600000 HC ROOM AND CARE INTERMEDIATE/TELEMETRY

## 2025-01-11 PROCEDURE — 85610 PROTHROMBIN TIME: CPT | Performed by: NURSE PRACTITIONER

## 2025-01-11 PROCEDURE — 63700000 HC SELF-ADMINISTRABLE DRUG: Performed by: NURSE PRACTITIONER

## 2025-01-11 PROCEDURE — 99233 SBSQ HOSP IP/OBS HIGH 50: CPT | Performed by: HOSPITALIST

## 2025-01-11 PROCEDURE — 25000000 HC PHARMACY GENERAL: Performed by: HOSPITALIST

## 2025-01-11 RX ORDER — WARFARIN SODIUM 5 MG/1
5 TABLET ORAL ONCE
Status: COMPLETED | OUTPATIENT
Start: 2025-01-11 | End: 2025-01-11

## 2025-01-11 RX ADMIN — METOPROLOL SUCCINATE ER TABLETS 50 MG: 50 TABLET, FILM COATED, EXTENDED RELEASE ORAL at 10:18

## 2025-01-11 RX ADMIN — INSULIN LISPRO 3 UNITS: 100 INJECTION, SOLUTION INTRAVENOUS; SUBCUTANEOUS at 13:47

## 2025-01-11 RX ADMIN — PREDNISONE 40 MG: 20 TABLET ORAL at 10:18

## 2025-01-11 RX ADMIN — GUAIFENESIN 1200 MG: 600 TABLET, EXTENDED RELEASE ORAL at 20:59

## 2025-01-11 RX ADMIN — ATORVASTATIN CALCIUM 10 MG: 10 TABLET, FILM COATED ORAL at 10:18

## 2025-01-11 RX ADMIN — INSULIN LISPRO 5 UNITS: 100 INJECTION, SOLUTION INTRAVENOUS; SUBCUTANEOUS at 13:46

## 2025-01-11 RX ADMIN — INSULIN LISPRO 5 UNITS: 100 INJECTION, SOLUTION INTRAVENOUS; SUBCUTANEOUS at 17:29

## 2025-01-11 RX ADMIN — INSULIN LISPRO 3 UNITS: 100 INJECTION, SOLUTION INTRAVENOUS; SUBCUTANEOUS at 21:59

## 2025-01-11 RX ADMIN — INSULIN LISPRO 5 UNITS: 100 INJECTION, SOLUTION INTRAVENOUS; SUBCUTANEOUS at 10:31

## 2025-01-11 RX ADMIN — DIGOXIN 125 MCG: 125 TABLET ORAL at 10:18

## 2025-01-11 RX ADMIN — MONTELUKAST 10 MG: 10 TABLET, FILM COATED ORAL at 21:38

## 2025-01-11 RX ADMIN — FAMOTIDINE 20 MG: 20 TABLET, FILM COATED ORAL at 21:38

## 2025-01-11 RX ADMIN — AMLODIPINE BESYLATE 2.5 MG: 2.5 TABLET ORAL at 10:18

## 2025-01-11 RX ADMIN — WARFARIN SODIUM 5 MG: 5 TABLET ORAL at 17:29

## 2025-01-11 RX ADMIN — SENNOSIDES AND DOCUSATE SODIUM 1 TABLET: 8.6; 5 TABLET ORAL at 21:38

## 2025-01-11 RX ADMIN — POLYETHYLENE GLYCOL 3350 17 G: 17 POWDER, FOR SOLUTION ORAL at 10:18

## 2025-01-11 RX ADMIN — SODIUM ZIRCONIUM CYCLOSILICATE 10 G: 10 POWDER, FOR SUSPENSION ORAL at 10:18

## 2025-01-11 RX ADMIN — GUAIFENESIN 1200 MG: 600 TABLET, EXTENDED RELEASE ORAL at 10:18

## 2025-01-11 ASSESSMENT — COGNITIVE AND FUNCTIONAL STATUS - GENERAL
CLIMB 3 TO 5 STEPS WITH RAILING: 2 - A LOT
MOVING TO AND FROM BED TO CHAIR: 3 - A LITTLE
CLIMB 3 TO 5 STEPS WITH RAILING: 2 - A LOT
WALKING IN HOSPITAL ROOM: 2 - A LOT
WALKING IN HOSPITAL ROOM: 2 - A LOT
STANDING UP FROM CHAIR USING ARMS: 2 - A LOT
MOVING TO AND FROM BED TO CHAIR: 3 - A LITTLE
STANDING UP FROM CHAIR USING ARMS: 2 - A LOT

## 2025-01-11 NOTE — PLAN OF CARE
Problem: Adult Inpatient Plan of Care  Goal: Plan of Care Review  Outcome: Progressing  Flowsheets (Taken 1/11/2025 0443)  Progress: improving  Outcome Evaluation: Pt AAOx2. Denies pain and SOB. Afib on the cardia monitor. 2L NC. Purewick in place.On fluid restriction. Assistx1. Maintained droplet and contact precaution. Administered meds per MAR. Safety maintained and call light within reach.  Plan of Care Reviewed With: patient   Plan of Care Review  Plan of Care Reviewed With: patient  Progress: improving  Outcome Evaluation: Pt AAOx2. Denies pain and SOB. Afib on the cardia monitor. 2L NC. Purewick in place.On fluid restriction. Assistx1. Maintained droplet and contact precaution. Administered meds per MAR. Safety maintained and call light within reach.   Alert and oriented to person, place and time. Normal mood and affect, no apparent risk to self or others

## 2025-01-11 NOTE — PROGRESS NOTES
Warfarin Dosing by Pharmacy Consult Follow up    Ev Monzon is a 79 y.o. female who has been consulted for warfarin dosing for Atrial fibrillation.    Reviewed relevant clinical data including weight, previous warfarin doses, CBC and PT/INR levels:  PT   Date/Time Value Ref Range Status   01/11/2025 0459 19.4 (H) 12.2 - 14.5 sec Final   01/10/2025 0512 21.1 (H) 12.2 - 14.5 sec Final   01/09/2025 0404 19.1 (H) 12.2 - 14.5 sec Final     INR   Date/Time Value Ref Range Status   01/11/2025 0459 1.7   Final     Comment:     Moderate Intensity Anticoagulation = 2.0 to 3.0, High Intensity = 2.5 to 3.5   01/10/2025 0512 1.9   Final     Comment:     Moderate Intensity Anticoagulation = 2.0 to 3.0, High Intensity = 2.5 to 3.5   01/09/2025 0404 1.6   Final     Comment:     Moderate Intensity Anticoagulation = 2.0 to 3.0, High Intensity = 2.5 to 3.5       CBC Results         01/11/25 01/10/25 01/09/25     0459 0512 0404    WBC 17.04 18.49 19.15    RBC 3.41 3.93 4.50    HGB 10.3 11.8 13.7    HCT 31.5 36.2 41.5    MCV 92.4 92.1 92.2    MCH 30.2 30.0 30.4    MCHC 32.7 32.6 33.0     266 283               Warfarin Administrations (last 96 hours)       Date/Time Action Medication Dose    01/08/25 1722 Given    warfarin (COUMADIN) tablet 3.5 mg 3.5 mg            Vitamin K Administrations (last 96 hours)       None            Assessment/Plan  The patient is ordered warfarin dosing by pharmacy.      Warfarin INR level 1.7 after dose of 0 mg PO x1.  INR goal: 2 - 3    Will adjust dose to warfarin 5 mg PO x1.    Next INR:  1/12/25 @ 06:00    Patient is not receiving concurrent parenteral anticoagulation for bridging purposes.    Pharmacy will continue to follow the patient's warfarin dosing daily during this course of therapy.    Please call INR levels to the pharmacy.  Presley De La Torre, DeuceD

## 2025-01-11 NOTE — PLAN OF CARE
Care Coordination Discharge Plan Note     Discharge Needs Assessment  Concerns to be Addressed: care coordination/care conferences  Current Discharge Risk: physical impairment    Anticipated Discharge Plan  Anticipated Discharge Disposition: skilled nursing facility  Type of Skilled Nursing Care Services: PT, OT, nursing      Patient Choice  Offered/Gave Vendor List: yes  Patient's Choice of Community Agency(s): SNF    Patient and/or patient guardian/advocate was made aware of their right to choose a provider. A list of eligible providers was presented and reviewed with the patient and/or patient guardian/advocate in written and/or verbal form. The list delineates providers in the patient’s desired geographic area who are participating in the Medicare program and/or providers contracted with the patient’s primary insurance. The Medicare list and quality ratings were obtained from the Medicare.gov [medicare.gov] website.    ---------------------------------------------------------------------------------------------------------------------    Interdisciplinary Discharge Plan Review:  Participants:social work/services, other (comments), physician    Concerns Comments: SW checked in [facility] for SNF referrals; No accepting SNFs yet. Sent additional referrals. SW to follow.      Discharge Transportation:  Is Out of Hospital DNR needed at Discharge: no  Does patient need discharge transport? Yes

## 2025-01-11 NOTE — PROGRESS NOTES
Hospital Medicine     Daily Progress Note                SUBJECTIVE   Interval History: some coughing but otherwise feels ok today.      OBJECTIVE      Vital signs in last 24 hours:  Temp:  [36.1 °C (97 °F)-37 °C (98.6 °F)] 36.1 °C (97 °F)  Heart Rate:  [60-96] 66  Resp:  [15-29] 16  BP: ()/(48-76) 108/71    Intake/Output Summary (Last 24 hours) at 1/11/2025 0759  Last data filed at 1/10/2025 2000  Gross per 24 hour   Intake 10 ml   Output --   Net 10 ml         PHYSICAL EXAMINATION      Physical Exam    General: No apparent distress, appears stated age,obese     HNT: Normocephalic, atraumatic, moist mucous membranes     Eyes: Sclera anicteric, EOMI, PERRL     Respiratory: mild wheezing bilaterally, on 2L o2 via NC     CVS: No JVD, S1 S2 heard, no murmurs, pulse regular rate and rhythm     Abdomen: Soft, non-tender, non-distended, no evidence of free fluid appreciated, bowel sounds noted     Genitourinary: No CVA tenderness, external catheter      Extremities: Right hand swelling and bruising noted, not oozing anymore.     Neurologic: Alert/awake/oriented X 3, following verbal commands, grossly no focal neurological deficits appreciated   LINES, CATHETERS, DRAINS, AIRWAYS, AND WOUNDS   Lines, Drains, and Airways:  Wounds (agree with documentation and present on admission):  Peripheral IV (Adult) 01/04/25 Left Antecubital (Active)   Number of days: 7       External Urinary Catheter Female (one size) (Active)   Number of days: 7       Catheterization Site - Arterial Right Radial 6 Fr. (Active)   Number of days: 3         Comments:    LABS / IMAGING / TELE      Labs  I have reviewed the patient's pertinent labs.  Significant abnormals are [id number].      Imaging  I have independently reviewed the pertinent imaging from the last 24 hrs.    ECG/Telemetry  I have independently reviewed the telemetry. No events for the last 24 hours.    ASSESSMENT AND PLAN        Assessment & Plan    # Acute respiratory insufficiency  w/ hypoxia, improving  -Not oxygen dependent at baseline.  2/2 RSV with acute asthma exacerbation  -Currently on 1L 02 via NC- can wean at SNF  --Option of BiPAP 15/8 PRN respiratory distress/increased work of breathing  -Encourage incentive spirometer use while hospitalized  -OOB to chair  - Pulm following-cleared for DC  - continue home: montelukast 10 mg po daily     # Asthma, acutely exacerbated  --Maintained on high-dose Breo Ellipta, Singulair, and Albuterol PRN at home  -S/p Azithromycin 500mg IV x3 doses, course completed on 1/6  -Mucinex 1,200mg PO BID  -Singulair 10mg PO qHS  -IV Solumedrol 40mg IV q12 transitioned to Prednisone 40mg PO daily startring 1/10-wean by 10mg every 3 days  -Out-patient follow up w/ Dr. Mcdaniel upon discharge  [facility]ABEL FOR DC TO SNF ONCE PLACEMENT FOUND     # RSV infection  -PCR positive on 1/4  -Supportive care  -Leukocytosis present on labs, suspected to be secondary to steroids     # Diabetes mellitus type 2  -Maintained on Januvia out-patient.  Hyperglycemic at presentation, likely due to steroid use PTA  --Humalog 5 units qAC  ---SSI for additional coverage  -HgB A1C of 7.5% as of 1/4/25  - holding home: metFORMIN tablet po, sitagliptin 100 mg po     # Elevated troponin  -P/w elevated troponin and ischemic changes on EKG concerning for subendocardial ischemia. Denies chest pain. Presentation not c/w ACS  --HST 1,645.3 -> 1,848.3 -> 1,603.8  -Cardiology input appreciated  --S/p Heparin gtt x48 hours.  -Out-patient Coumadin on hold-resumed 1/8-held 1/9 due to persistent oozing from rt radial site-can be resumed 1/11 per cardio---> restarted today 1/11  - cardiac catheterization 1/8 with Angiographically patent epicardial coronary arteries  -2D echo from 1/6 w/ mild cLVH and EF 55%. A small portion of the the anterolateral wall appears to be markedly hypokinetic. Mild TR. RVSP = 63 mmHg. Mildly dilated RA, moderately dilated LA  -C/w ASA & Statin therapies     #  Hyperlipidemia  -Maintained on Atorvastatin out-patient  -FLP from 1/4 w/ Chol 100, Tg 90, HDL 36, LDL 46  -Dosing increased. Continue while hospitalized     # Atrial fibrillation  -Maintained on Coumadin out-patient. Rates currently controlled  -S/p Heparin gtt as outlined above.warfrain held for 48 hrs for persistent oozing from radial site  -Cardiology following- restarted coumadin today 1/11 per cards     # Hyponatremia: mild, resolved  -Na levels of 128 at presentation, 138 today  -Free water restriction  -Daily BMP     # Hypertension  -Does not appear to have a formal h/o hypertension. BB had been held at presentation. BP noted to be elevated on 1/6 per nursing, ranging 147-200/65-81mmHg  -Hydralazine 10mg IVP q6 hours PRN SBP >160mmHg  -Cardiology following  --Toprol XL 50mg pO qDay  -Monitor BP  - discontinued: valsartan 40 mg po, nicardipine, nitroglycerin     # Obesity  -Body mass index is 30.32 kg/m².  -Complicates medical care. Diet and exercise w/ a goal of weight loss to a more ideal body weight encouraged     # Hyperkalemia: moderate, resolved  - potassium: 4.4 (1/11/25) down from 5.4 (1/10/25), high 5.9 (1/9/25)  -holding Valsrtan  -am BMP  - currently on: sodium zirconium cyclosilicate 10 g po daily     # Hypomagnesemia: acute, resolved  resolved  VTE Assessment: Padua    VTE Prophylaxis:  Current anticoagulants:  [Provider Managed Hold] warfarin (COUMADIN) therapy by pharmacy protocol, Other, evaluate daily      Code Status: Full Code      Estimated Discharge Date: 1/13/2025   Disposition Planning: stable for dc to SNF once placement found          Albert Rico MD  1/11/2025

## 2025-01-12 LAB
ANION GAP SERPL CALC-SCNC: 5 MEQ/L (ref 3–15)
BASOPHILS # BLD: 0.01 K/UL (ref 0.01–0.1)
BASOPHILS NFR BLD: 0.1 %
BUN SERPL-MCNC: 51 MG/DL (ref 7–25)
CALCIUM SERPL-MCNC: 8 MG/DL (ref 8.6–10.3)
CHLORIDE SERPL-SCNC: 102 MEQ/L (ref 98–107)
CO2 SERPL-SCNC: 33 MEQ/L (ref 21–31)
CREAT SERPL-MCNC: 0.9 MG/DL (ref 0.6–1.2)
DIFFERENTIAL METHOD BLD: ABNORMAL
EGFRCR SERPLBLD CKD-EPI 2021: >60 ML/MIN/1.73M*2
EOSINOPHIL # BLD: 0.08 K/UL (ref 0.04–0.36)
EOSINOPHIL NFR BLD: 0.4 %
ERYTHROCYTE [DISTWIDTH] IN BLOOD BY AUTOMATED COUNT: 13.2 % (ref 11.7–14.4)
ERYTHROCYTE [DISTWIDTH] IN BLOOD BY AUTOMATED COUNT: 13.2 % (ref 11.7–14.4)
GLUCOSE BLD-MCNC: 110 MG/DL (ref 70–99)
GLUCOSE BLD-MCNC: 166 MG/DL (ref 70–99)
GLUCOSE BLD-MCNC: 167 MG/DL (ref 70–99)
GLUCOSE BLD-MCNC: 202 MG/DL (ref 70–99)
GLUCOSE SERPL-MCNC: 175 MG/DL (ref 70–99)
HCT VFR BLD AUTO: 32.4 % (ref 35–45)
HCT VFR BLD AUTO: 32.4 % (ref 35–45)
HGB BLD-MCNC: 10.2 G/DL (ref 11.8–15.7)
HGB BLD-MCNC: 10.2 G/DL (ref 11.8–15.7)
IMM GRANULOCYTES # BLD AUTO: 0.12 K/UL (ref 0–0.08)
IMM GRANULOCYTES NFR BLD AUTO: 0.7 %
INR PPP: 1.8
LYMPHOCYTES # BLD: 2.4 K/UL (ref 1.2–3.5)
LYMPHOCYTES NFR BLD: 13.3 %
MAGNESIUM SERPL-MCNC: 2.1 MG/DL (ref 1.8–2.5)
MCH RBC QN AUTO: 29.9 PG (ref 28–33.2)
MCH RBC QN AUTO: 29.9 PG (ref 28–33.2)
MCHC RBC AUTO-ENTMCNC: 31.5 G/DL (ref 32.2–35.5)
MCHC RBC AUTO-ENTMCNC: 31.5 G/DL (ref 32.2–35.5)
MCV RBC AUTO: 95 FL (ref 83–98)
MCV RBC AUTO: 95 FL (ref 83–98)
MONOCYTES # BLD: 1.23 K/UL (ref 0.28–0.8)
MONOCYTES NFR BLD: 6.8 %
NEUTROPHILS # BLD: 14.25 K/UL (ref 1.7–7)
NEUTS SEG NFR BLD: 78.7 %
NRBC BLD-RTO: 0 %
PLATELET # BLD AUTO: 260 K/UL (ref 150–369)
PLATELET # BLD AUTO: 260 K/UL (ref 150–369)
PMV BLD AUTO: 11 FL (ref 9.4–12.3)
PMV BLD AUTO: 11 FL (ref 9.4–12.3)
POCT TEST: ABNORMAL
POTASSIUM SERPL-SCNC: 4.1 MEQ/L (ref 3.5–5.1)
PROTHROMBIN TIME: 20.7 SEC (ref 12.2–14.5)
RBC # BLD AUTO: 3.41 M/UL (ref 3.93–5.22)
RBC # BLD AUTO: 3.41 M/UL (ref 3.93–5.22)
SODIUM SERPL-SCNC: 140 MEQ/L (ref 136–145)
WBC # BLD AUTO: 18.09 K/UL (ref 3.8–10.5)
WBC # BLD AUTO: 18.09 K/UL (ref 3.8–10.5)

## 2025-01-12 PROCEDURE — 20600000 HC ROOM AND CARE INTERMEDIATE/TELEMETRY

## 2025-01-12 PROCEDURE — 63700000 HC SELF-ADMINISTRABLE DRUG: Performed by: INTERNAL MEDICINE

## 2025-01-12 PROCEDURE — 83735 ASSAY OF MAGNESIUM: CPT | Performed by: INTERNAL MEDICINE

## 2025-01-12 PROCEDURE — 63700000 HC SELF-ADMINISTRABLE DRUG: Performed by: NURSE PRACTITIONER

## 2025-01-12 PROCEDURE — 80048 BASIC METABOLIC PNL TOTAL CA: CPT | Performed by: INTERNAL MEDICINE

## 2025-01-12 PROCEDURE — 85027 COMPLETE CBC AUTOMATED: CPT | Performed by: HOSPITALIST

## 2025-01-12 PROCEDURE — 99233 SBSQ HOSP IP/OBS HIGH 50: CPT | Performed by: HOSPITALIST

## 2025-01-12 PROCEDURE — 85610 PROTHROMBIN TIME: CPT | Performed by: NURSE PRACTITIONER

## 2025-01-12 PROCEDURE — 85025 COMPLETE CBC W/AUTO DIFF WBC: CPT | Performed by: INTERNAL MEDICINE

## 2025-01-12 PROCEDURE — 36415 COLL VENOUS BLD VENIPUNCTURE: CPT | Performed by: NURSE PRACTITIONER

## 2025-01-12 PROCEDURE — 63600000 HC DRUGS/DETAIL CODE: Performed by: INTERNAL MEDICINE

## 2025-01-12 RX ORDER — WARFARIN SODIUM 5 MG/1
5 TABLET ORAL ONCE
Status: COMPLETED | OUTPATIENT
Start: 2025-01-12 | End: 2025-01-12

## 2025-01-12 RX ADMIN — INSULIN LISPRO 5 UNITS: 100 INJECTION, SOLUTION INTRAVENOUS; SUBCUTANEOUS at 17:06

## 2025-01-12 RX ADMIN — PREDNISONE 40 MG: 20 TABLET ORAL at 09:17

## 2025-01-12 RX ADMIN — SENNOSIDES AND DOCUSATE SODIUM 1 TABLET: 8.6; 5 TABLET ORAL at 20:55

## 2025-01-12 RX ADMIN — POLYETHYLENE GLYCOL 3350 17 G: 17 POWDER, FOR SOLUTION ORAL at 09:17

## 2025-01-12 RX ADMIN — WARFARIN SODIUM 5 MG: 5 TABLET ORAL at 17:01

## 2025-01-12 RX ADMIN — FAMOTIDINE 20 MG: 20 TABLET, FILM COATED ORAL at 20:55

## 2025-01-12 RX ADMIN — INSULIN LISPRO 5 UNITS: 100 INJECTION, SOLUTION INTRAVENOUS; SUBCUTANEOUS at 09:23

## 2025-01-12 RX ADMIN — MONTELUKAST 10 MG: 10 TABLET, FILM COATED ORAL at 20:55

## 2025-01-12 RX ADMIN — METOPROLOL SUCCINATE ER TABLETS 50 MG: 50 TABLET, FILM COATED, EXTENDED RELEASE ORAL at 09:18

## 2025-01-12 RX ADMIN — GUAIFENESIN 1200 MG: 600 TABLET, EXTENDED RELEASE ORAL at 09:18

## 2025-01-12 RX ADMIN — GUAIFENESIN 1200 MG: 600 TABLET, EXTENDED RELEASE ORAL at 20:55

## 2025-01-12 RX ADMIN — INSULIN LISPRO 3 UNITS: 100 INJECTION, SOLUTION INTRAVENOUS; SUBCUTANEOUS at 12:47

## 2025-01-12 RX ADMIN — ATORVASTATIN CALCIUM 10 MG: 10 TABLET, FILM COATED ORAL at 09:18

## 2025-01-12 RX ADMIN — INSULIN LISPRO 5 UNITS: 100 INJECTION, SOLUTION INTRAVENOUS; SUBCUTANEOUS at 12:48

## 2025-01-12 RX ADMIN — DIGOXIN 125 MCG: 125 TABLET ORAL at 09:18

## 2025-01-12 ASSESSMENT — COGNITIVE AND FUNCTIONAL STATUS - GENERAL
MOVING TO AND FROM BED TO CHAIR: 3 - A LITTLE
WALKING IN HOSPITAL ROOM: 2 - A LOT
STANDING UP FROM CHAIR USING ARMS: 2 - A LOT
CLIMB 3 TO 5 STEPS WITH RAILING: 1 - TOTAL

## 2025-01-12 NOTE — PLAN OF CARE
Plan of Care Review  Plan of Care Reviewed With: patient  Progress: improving  Outcome Evaluation: Received pt in bed. Afib on the cardia monitor. Assistx1 to the bedside commdo.SCDs ON. 1L of NC. Administered meds per MAR. Safety maintained and call light within reach.

## 2025-01-12 NOTE — PLAN OF CARE
Care Coordination Discharge Plan Note     Discharge Needs Assessment  Concerns to be Addressed: care coordination/care conferences  Current Discharge Risk: physical impairment    Anticipated Discharge Plan  Anticipated Discharge Disposition: skilled nursing facility  Type of Skilled Nursing Care Services: PT, OT, nursing      Patient Choice  Offered/Gave Vendor List: yes  Patient's Choice of Community Agency(s): SNF    Patient and/or patient guardian/advocate was made aware of their right to choose a provider. A list of eligible providers was presented and reviewed with the patient and/or patient guardian/advocate in written and/or verbal form. The list delineates providers in the patient’s desired geographic area who are participating in the Medicare program and/or providers contracted with the patient’s primary insurance. The Medicare list and quality ratings were obtained from the Medicare.gov [medicare.gov] website.    ---------------------------------------------------------------------------------------------------------------------    Interdisciplinary Discharge Plan Review:  Participants:social work/services, other (comments), physician    Concerns Comments: SW attempted to speak w. Pt sister Heriberto but had to leave a VM requesting a call back to review accepting SNFs and mentioned that pt is stable for d/c. SW to follow.     Discharge Transportation:  Is Out of Hospital DNR needed at Discharge: no  Does patient need discharge transport? Yes

## 2025-01-12 NOTE — NURSING NOTE
Pt received AAOx4, no c/o pain or SOB. Pt reporting ongoing congested cough occasionally productive. Pt performing IS and aerobkia use as ordered. Pt O2 sat 96% on 1LO2 at rest, RN attempted to wean to RA. Rn rechecked O2 sat after being on RA for 30 mins and pt O2 decreased to 88% on RA at rest. 1 LO2 reapplied. Bed bath provided. POC ongoing.

## 2025-01-12 NOTE — PROGRESS NOTES
Warfarin Dosing by Pharmacy Consult Follow up    Ev Monzon is a 79 y.o. female who has been consulted for warfarin dosing for Atrial fibrillation.    Reviewed relevant clinical data including weight, previous warfarin doses, CBC and PT/INR levels:  PT   Date/Time Value Ref Range Status   01/12/2025 0910 20.7 (H) 12.2 - 14.5 sec Final   01/11/2025 0459 19.4 (H) 12.2 - 14.5 sec Final   01/10/2025 0512 21.1 (H) 12.2 - 14.5 sec Final     INR   Date/Time Value Ref Range Status   01/12/2025 0910 1.8   Final     Comment:     Moderate Intensity Anticoagulation = 2.0 to 3.0, High Intensity = 2.5 to 3.5   01/11/2025 0459 1.7   Final     Comment:     Moderate Intensity Anticoagulation = 2.0 to 3.0, High Intensity = 2.5 to 3.5   01/10/2025 0512 1.9   Final     Comment:     Moderate Intensity Anticoagulation = 2.0 to 3.0, High Intensity = 2.5 to 3.5       CBC Results         01/12/25 01/11/25 01/10/25     0910 0459 0512    WBC 18.09 17.04 18.49     18.09      RBC 3.41 3.41 3.93     3.41      HGB 10.2 10.3 11.8     10.2      HCT 32.4 31.5 36.2     32.4      MCV 95.0 92.4 92.1     95.0      MCH 29.9 30.2 30.0     29.9      MCHC 31.5 32.7 32.6     31.5       247 266     260                 Warfarin Administrations (last 96 hours)       Date/Time Action Medication Dose    01/11/25 1729 Given    warfarin (COUMADIN) tablet 5 mg 5 mg    01/08/25 1722 Given    warfarin (COUMADIN) tablet 3.5 mg 3.5 mg            Vitamin K Administrations (last 96 hours)       None            Assessment/Plan  The patient is ordered warfarin dosing by pharmacy.      Warfarin INR level 1.8 after dose of 5 mg PO x1.  INR goal: 2 - 3    Will continue warfarin 5 mg PO x1.    Next INR:  1/13/25 @ 06:00    Patient is not receiving concurrent parenteral anticoagulation for bridging purposes.    Pharmacy will continue to follow the patient's warfarin dosing daily during this course of therapy.    Please call INR levels to the pharmacy.  Presley De La Torre,  PharmD

## 2025-01-12 NOTE — PROGRESS NOTES
Hospital Medicine     Daily Progress Note                SUBJECTIVE   Interval History: no events     OBJECTIVE      Vital signs in last 24 hours:  Temp:  [36.1 °C (97 °F)-36.5 °C (97.7 °F)] 36.1 °C (97 °F)  Heart Rate:  [58-72] 62  Resp:  [16-18] 18  BP: (106-130)/(56-81) 117/57    Intake/Output Summary (Last 24 hours) at 1/12/2025 0826  Last data filed at 1/11/2025 2000  Gross per 24 hour   Intake 10 ml   Output 300 ml   Net -290 ml         PHYSICAL EXAMINATION      Physical Exam    General: No apparent distress, appears stated age,obese     HNT: Normocephalic, atraumatic, moist mucous membranes     Eyes: Sclera anicteric, EOMI, PERRL     Respiratory: mild wheezing bilaterally, on 2L o2 via NC     CVS: No JVD, S1 S2 heard, no murmurs, pulse regular rate and rhythm     Abdomen: Soft, non-tender, non-distended, no evidence of free fluid appreciated, bowel sounds noted     Genitourinary: No CVA tenderness, external catheter      Extremities: Right hand swelling and bruising noted, not oozing anymore.     Neurologic: Alert/awake/oriented X 3, following verbal commands, grossly no focal neurological deficits appreciated   LINES, CATHETERS, DRAINS, AIRWAYS, AND WOUNDS   Lines, Drains, and Airways:  Wounds (agree with documentation and present on admission):  Peripheral IV (Adult) 01/04/25 Left Antecubital (Active)   Number of days: 8       External Urinary Catheter Female (one size) (Active)   Number of days: 8       Catheterization Site - Arterial Right Radial 6 Fr. (Active)   Number of days: 4         Comments:    LABS / IMAGING / TELE      Labs  Labs are pending.      Imaging  I have independently reviewed the pertinent imaging from the last 24 hrs.    ECG/Telemetry  I have independently reviewed the telemetry. No events for the last 24 hours.    ASSESSMENT AND PLAN        Assessment & Plan    Assessment and Plan     # Acute respiratory insufficiency w/ hypoxia, improving  -Not oxygen dependent at baseline.  2/2  RSV with acute asthma exacerbation  -Currently on 1L 02 via NC- can wean at SNF  --Option of BiPAP 15/8 PRN respiratory distress/increased work of breathing  -Encourage incentive spirometer use while hospitalized  -OOB to chair  - Pulm following-cleared for DC  - continue home: montelukast 10 mg po daily     # Asthma, acutely exacerbated  --Maintained on high-dose Breo Ellipta, Singulair, and Albuterol PRN at home  -S/p Azithromycin 500mg IV x3 doses, course completed on 1/6  -Mucinex 1,200mg PO BID  -Singulair 10mg PO qHS  -IV Solumedrol 40mg IV q12 transitioned to Prednisone 40mg PO daily startring 1/10-wean by 10mg every 3 days  -Out-patient follow up w/ Dr. Mcdaniel upon discharge  STABEL FOR DC TO SNF ONCE PLACEMENT FOUND     # RSV infection  -PCR positive on 1/4  -Supportive care  -Leukocytosis present on labs, suspected to be secondary to steroids     # Diabetes mellitus type 2  -Maintained on Januvia out-patient.  Hyperglycemic at presentation, likely due to steroid use PTA  --Humalog 5 units qAC  ---SSI for additional coverage  -HgB A1C of 7.5% as of 1/4/25  - holding home: metFORMIN tablet po, sitagliptin 100 mg po     # Elevated troponin  -P/w elevated troponin and ischemic changes on EKG concerning for subendocardial ischemia. Denies chest pain. Presentation not c/w ACS  --HST 1,645.3 -> 1,848.3 -> 1,603.8  -Cardiology input appreciated  --S/p Heparin gtt x48 hours.  -Out-patient Coumadin on hold-resumed 1/8-held 1/9 due to persistent oozing from rt radial site-can be resumed 1/11 per cardio---> restarted today 1/11  - cardiac catheterization 1/8 with Angiographically patent epicardial coronary arteries  -2D echo from 1/6 w/ mild cLVH and EF 55%. A small portion of the the anterolateral wall appears to be markedly hypokinetic. Mild TR. RVSP = 63 mmHg. Mildly dilated RA, moderately dilated LA  -C/w ASA & Statin therapies     # Hyperlipidemia  -Maintained on Atorvastatin out-patient  -FLP from 1/4 w/ Chol  100, Tg 90, HDL 36, LDL 46  -Dosing increased. Continue while hospitalized     # Atrial fibrillation  -Maintained on Coumadin out-patient. Rates currently controlled, cont BB  -S/p Heparin gtt as outlined above.warfrain held for 48 hrs for persistent oozing from radial site  -Cardiology following- restarted coumadin today 1/11 per cards     # Hyponatremia: mild, resolved  -Na levels of 128 at presentation, 138 today  -Free water restriction  -Daily BMP     # Hypertension  -Does not appear to have a formal h/o hypertension. BB had been held at presentation. BP noted to be elevated on 1/6 per nursing, ranging 147-200/65-81mmHg  -Hydralazine 10mg IVP q6 hours PRN SBP >160mmHg  -Cardiology following  --Toprol XL 50mg pO qDay, cont norvasc  -Monitor BP       # Obesity  -Body mass index is 30.32 kg/m².  -Complicates medical care. Diet and exercise w/ a goal of weight loss to a more ideal body weight encouraged     # Hyperkalemia: resolved  - potassium: 4.4 (1/11/25) down from 5.4 (1/10/25), high 5.9 (1/9/25)  -holding Valsrtan  -am BMP  - currently on: sodium zirconium cyclosilicate 10 g po daily     # Hypomagnesemia: acute, resolved  resolved  VTE Assessment: Padua    VTE Prophylaxis:  Current anticoagulants:  warfarin (COUMADIN) therapy by pharmacy protocol, Other, evaluate daily      Code Status: Full Code      Estimated Discharge Date: 1/13/2025   Disposition Planning: stable for SNF          Albert Rico MD  1/12/2025

## 2025-01-13 LAB
ANION GAP SERPL CALC-SCNC: 6 MEQ/L (ref 3–15)
BASOPHILS # BLD: 0.01 K/UL (ref 0.01–0.1)
BASOPHILS NFR BLD: 0.1 %
BUN SERPL-MCNC: 40 MG/DL (ref 7–25)
CALCIUM SERPL-MCNC: 8 MG/DL (ref 8.6–10.3)
CHLORIDE SERPL-SCNC: 101 MEQ/L (ref 98–107)
CO2 SERPL-SCNC: 29 MEQ/L (ref 21–31)
CREAT SERPL-MCNC: 0.8 MG/DL (ref 0.6–1.2)
DIFFERENTIAL METHOD BLD: ABNORMAL
EGFRCR SERPLBLD CKD-EPI 2021: >60 ML/MIN/1.73M*2
EOSINOPHIL # BLD: 0.04 K/UL (ref 0.04–0.36)
EOSINOPHIL NFR BLD: 0.2 %
ERYTHROCYTE [DISTWIDTH] IN BLOOD BY AUTOMATED COUNT: 13.3 % (ref 11.7–14.4)
GLUCOSE BLD-MCNC: 102 MG/DL (ref 70–99)
GLUCOSE BLD-MCNC: 205 MG/DL (ref 70–99)
GLUCOSE BLD-MCNC: 270 MG/DL (ref 70–99)
GLUCOSE BLD-MCNC: 300 MG/DL (ref 70–99)
GLUCOSE SERPL-MCNC: 241 MG/DL (ref 70–99)
HCT VFR BLD AUTO: 29.3 % (ref 35–45)
HGB BLD-MCNC: 9.4 G/DL (ref 11.8–15.7)
IMM GRANULOCYTES # BLD AUTO: 0.18 K/UL (ref 0–0.08)
IMM GRANULOCYTES NFR BLD AUTO: 1 %
INR PPP: 2.9
LYMPHOCYTES # BLD: 0.98 K/UL (ref 1.2–3.5)
LYMPHOCYTES NFR BLD: 5.2 %
MAGNESIUM SERPL-MCNC: 2 MG/DL (ref 1.8–2.5)
MCH RBC QN AUTO: 30 PG (ref 28–33.2)
MCHC RBC AUTO-ENTMCNC: 32.1 G/DL (ref 32.2–35.5)
MCV RBC AUTO: 93.6 FL (ref 83–98)
MONOCYTES # BLD: 0.71 K/UL (ref 0.28–0.8)
MONOCYTES NFR BLD: 3.8 %
NEUTROPHILS # BLD: 16.95 K/UL (ref 1.7–7)
NEUTS SEG NFR BLD: 89.7 %
NRBC BLD-RTO: 0 %
PLATELET # BLD AUTO: 303 K/UL (ref 150–369)
PMV BLD AUTO: 10.9 FL (ref 9.4–12.3)
POCT TEST: ABNORMAL
POTASSIUM SERPL-SCNC: 4.6 MEQ/L (ref 3.5–5.1)
PROTHROMBIN TIME: 30.1 SEC (ref 12.2–14.5)
RBC # BLD AUTO: 3.13 M/UL (ref 3.93–5.22)
SODIUM SERPL-SCNC: 136 MEQ/L (ref 136–145)
WBC # BLD AUTO: 18.87 K/UL (ref 3.8–10.5)

## 2025-01-13 PROCEDURE — 63700000 HC SELF-ADMINISTRABLE DRUG: Performed by: INTERNAL MEDICINE

## 2025-01-13 PROCEDURE — 80048 BASIC METABOLIC PNL TOTAL CA: CPT | Performed by: INTERNAL MEDICINE

## 2025-01-13 PROCEDURE — 99233 SBSQ HOSP IP/OBS HIGH 50: CPT | Performed by: INTERNAL MEDICINE

## 2025-01-13 PROCEDURE — 20600000 HC ROOM AND CARE INTERMEDIATE/TELEMETRY

## 2025-01-13 PROCEDURE — 83735 ASSAY OF MAGNESIUM: CPT | Performed by: INTERNAL MEDICINE

## 2025-01-13 PROCEDURE — 85025 COMPLETE CBC W/AUTO DIFF WBC: CPT | Performed by: INTERNAL MEDICINE

## 2025-01-13 PROCEDURE — 63700000 HC SELF-ADMINISTRABLE DRUG: Performed by: NURSE PRACTITIONER

## 2025-01-13 PROCEDURE — 36415 COLL VENOUS BLD VENIPUNCTURE: CPT | Performed by: INTERNAL MEDICINE

## 2025-01-13 PROCEDURE — 85610 PROTHROMBIN TIME: CPT | Performed by: NURSE PRACTITIONER

## 2025-01-13 PROCEDURE — 25000000 HC PHARMACY GENERAL: Performed by: INTERNAL MEDICINE

## 2025-01-13 PROCEDURE — 25000000 HC PHARMACY GENERAL: Performed by: HOSPITALIST

## 2025-01-13 RX ADMIN — METOPROLOL SUCCINATE ER TABLETS 50 MG: 50 TABLET, FILM COATED, EXTENDED RELEASE ORAL at 09:23

## 2025-01-13 RX ADMIN — IPRATROPIUM BROMIDE AND ALBUTEROL SULFATE 3 ML: .5; 3 SOLUTION RESPIRATORY (INHALATION) at 21:56

## 2025-01-13 RX ADMIN — INSULIN LISPRO 3 UNITS: 100 INJECTION, SOLUTION INTRAVENOUS; SUBCUTANEOUS at 18:18

## 2025-01-13 RX ADMIN — INSULIN LISPRO 5 UNITS: 100 INJECTION, SOLUTION INTRAVENOUS; SUBCUTANEOUS at 18:18

## 2025-01-13 RX ADMIN — DIGOXIN 125 MCG: 125 TABLET ORAL at 09:23

## 2025-01-13 RX ADMIN — SODIUM ZIRCONIUM CYCLOSILICATE 10 G: 10 POWDER, FOR SUSPENSION ORAL at 12:41

## 2025-01-13 RX ADMIN — INSULIN LISPRO 5 UNITS: 100 INJECTION, SOLUTION INTRAVENOUS; SUBCUTANEOUS at 12:30

## 2025-01-13 RX ADMIN — INSULIN LISPRO 5 UNITS: 100 INJECTION, SOLUTION INTRAVENOUS; SUBCUTANEOUS at 09:30

## 2025-01-13 RX ADMIN — MONTELUKAST 10 MG: 10 TABLET, FILM COATED ORAL at 21:48

## 2025-01-13 RX ADMIN — PREDNISONE 40 MG: 20 TABLET ORAL at 09:23

## 2025-01-13 RX ADMIN — FAMOTIDINE 20 MG: 20 TABLET, FILM COATED ORAL at 21:48

## 2025-01-13 RX ADMIN — INSULIN LISPRO 3 UNITS: 100 INJECTION, SOLUTION INTRAVENOUS; SUBCUTANEOUS at 23:49

## 2025-01-13 RX ADMIN — AMLODIPINE BESYLATE 2.5 MG: 2.5 TABLET ORAL at 09:23

## 2025-01-13 RX ADMIN — SENNOSIDES AND DOCUSATE SODIUM 1 TABLET: 8.6; 5 TABLET ORAL at 21:48

## 2025-01-13 RX ADMIN — GUAIFENESIN 1200 MG: 600 TABLET, EXTENDED RELEASE ORAL at 21:48

## 2025-01-13 RX ADMIN — ATORVASTATIN CALCIUM 10 MG: 10 TABLET, FILM COATED ORAL at 09:23

## 2025-01-13 RX ADMIN — GUAIFENESIN 1200 MG: 600 TABLET, EXTENDED RELEASE ORAL at 09:22

## 2025-01-13 RX ADMIN — POLYETHYLENE GLYCOL 3350 17 G: 17 POWDER, FOR SOLUTION ORAL at 09:23

## 2025-01-13 ASSESSMENT — COGNITIVE AND FUNCTIONAL STATUS - GENERAL
MOVING TO AND FROM BED TO CHAIR: 3 - A LITTLE
WALKING IN HOSPITAL ROOM: 2 - A LOT
STANDING UP FROM CHAIR USING ARMS: 2 - A LOT
CLIMB 3 TO 5 STEPS WITH RAILING: 2 - A LOT

## 2025-01-13 ASSESSMENT — ENCOUNTER SYMPTOMS
ABDOMINAL PAIN: 0
SHORTNESS OF BREATH: 0
WHEEZING: 0
COUGH: 1

## 2025-01-13 NOTE — PROGRESS NOTES
Warfarin Dosing by Pharmacy Consult Follow up    Ev Monzon is a 79 y.o. female who has been consulted for warfarin dosing for Atrial fibrillation.    Reviewed relevant clinical data including weight, previous warfarin doses, CBC and PT/INR levels:  PT   Date/Time Value Ref Range Status   01/13/2025 1153 30.1 (H) 12.2 - 14.5 sec Final   01/12/2025 0910 20.7 (H) 12.2 - 14.5 sec Final   01/11/2025 0459 19.4 (H) 12.2 - 14.5 sec Final     INR   Date/Time Value Ref Range Status   01/13/2025 1153 2.9   Final     Comment:     Moderate Intensity Anticoagulation = 2.0 to 3.0, High Intensity = 2.5 to 3.5   01/12/2025 0910 1.8   Final     Comment:     Moderate Intensity Anticoagulation = 2.0 to 3.0, High Intensity = 2.5 to 3.5   01/11/2025 0459 1.7   Final     Comment:     Moderate Intensity Anticoagulation = 2.0 to 3.0, High Intensity = 2.5 to 3.5       CBC Results         01/13/25 01/12/25 01/11/25     1153 0910 0459    WBC 18.87 18.09 17.04      18.09     RBC 3.13 3.41 3.41      3.41     HGB 9.4 10.2 10.3      10.2     HCT 29.3 32.4 31.5      32.4     MCV 93.6 95.0 92.4      95.0     MCH 30.0 29.9 30.2      29.9     MCHC 32.1 31.5 32.7      31.5      260 247      260                Warfarin Administrations (last 96 hours)       Date/Time Action Medication Dose    01/12/25 1701 Given    warfarin (COUMADIN) tablet 5 mg 5 mg    01/11/25 1729 Given    warfarin (COUMADIN) tablet 5 mg 5 mg            Vitamin K Administrations (last 96 hours)       None            Assessment/Plan  The patient is ordered warfarin dosing by pharmacy.        Warfarin INR level 2.9 after dose of 5 mg PO x1.  INR goal: 2 - 3    Will hold warfarin PO x1.    Next INR:  01/14/25 @ 06:00    Patient is not receiving concurrent parenteral anticoagulation for bridging purposes.    Pharmacy will continue to follow the patient's warfarin dosing daily during this course of therapy.    Please call INR levels to the pharmacy.  Nayeli Lai,  PharmD

## 2025-01-13 NOTE — PLAN OF CARE
Plan of Care Review  Plan of Care Reviewed With: patient  Progress: improving  Outcome Evaluation: pt alert and oriented x3. disoriented to time.  forgetful. VSS. afib on monitor. assist x1 to bsc.  + bm  1L 02 via n.c.  oob to relincer chair. denies sob. accuchecks per order. chair alarm set

## 2025-01-13 NOTE — PLAN OF CARE
Care Coordination Discharge Plan Note     Discharge Needs Assessment  Concerns to be Addressed: care coordination/care conferences  Current Discharge Risk: physical impairment    Anticipated Discharge Plan  Anticipated Discharge Disposition: skilled nursing facility  Type of Skilled Nursing Care Services: PT, OT, nursing      Patient Choice  Offered/Gave Vendor List: yes  Patient's Choice of Community Agency(s): SNF    Patient and/or patient guardian/advocate was made aware of their right to choose a provider. A list of eligible providers was presented and reviewed with the patient and/or patient guardian/advocate in written and/or verbal form. The list delineates providers in the patient’s desired geographic area who are participating in the Medicare program and/or providers contracted with the patient’s primary insurance. The Medicare list and quality ratings were obtained from the Medicare.gov [medicare.gov] website.    ---------------------------------------------------------------------------------------------------------------------    Interdisciplinary Discharge Plan Review:  Participants:     Concerns Comments: Joana spoke to pt and bedside and pt's family to inform them of accepting facilities.  Pt's family was agreeable to [facility].  SW reached out to [facility] to inform them that pt's family is agreeable.  [facility] states that they would have a bed available tomorrow.    Discharge Plan:   Disposition/Destination:   /    Discharge Facility:    Community Resources:      Discharge Transportation:  Is Out of Hospital DNR needed at Discharge: no  Does patient need discharge transport? Yes

## 2025-01-13 NOTE — PROGRESS NOTES
Hospital Medicine     Daily Progress Note       SUBJECTIVE   Pleasant, no new complaints. Agreeable to SNF.     OBJECTIVE   Vital signs in last 24 hours:  Temp:  [36.2 °C (97.2 °F)-36.6 °C (97.8 °F)] 36.4 °C (97.6 °F)  Heart Rate:  [54-72] 56  Resp:  [16-18] 16  BP: (112-168)/(53-87) 137/87  No intake or output data in the 24 hours ending 01/13/25 1017    Physical Exam     LINES, DRAINS, AIRWAYS, WOUNDS   Peripheral IV (Adult) 01/11/25 Anterior;Left;Proximal Forearm (Active)   Number of days: 2       Wound  Lower;Posterior;Right Arm (Active)   Number of days: 4       Wound  Upper;Medial Abdomen (Active)   Number of days: 3       Catheterization Site - Arterial Right Radial 6 Fr. (Active)   Number of days: 5          LABS, IMAGING, TELEMETRY   CHEMISTRIES   Results from last 7 days   Lab Units 01/12/25  0910 01/11/25  0459 01/10/25  0512 01/09/25  1051 01/09/25  0404   SODIUM mEQ/L 140 141 138 134* 136   CHLORIDE mEQ/L 102 102 99 102 102   CO2 mEQ/L 33* 33* 32* 26 31   BUN mg/dL 51* 63* 60* 56* 52*   CREATININE mg/dL 0.9 0.9 1.0 0.9 0.9   GLUCOSE mg/dL 175* 102* 160* 203* 145*   CALCIUM mg/dL 8.0* 8.2* 8.2* 8.1* 8.3*   EGFR mL/min/1.73m*2 >60.0 >60.0 56.7* >60.0 >60.0   ANION GAP mEQ/L 5 6 7 6 3   MAGNESIUM mg/dL 2.1 2.1 2.1  --  2.2     CBC RESULTS   Results from last 7 days   Lab Units 01/12/25  0910 01/11/25  0459 01/10/25  0512 01/09/25  0404 01/08/25  0314   WBC K/uL 18.09*  18.09* 17.04* 18.49* 19.15* 17.98*   HEMOGLOBIN g/dL 10.2*  10.2* 10.3* 11.8 13.7 13.4   HEMATOCRIT % 32.4*  32.4* 31.5* 36.2 41.5 41.1   PLATELETS K/uL 260  260 247 266 283 276                ASSESSMENT AND PLAN     Assessment and Plan     Assessment and Plan     # Acute respiratory insufficiency w/ hypoxia, improving  -Not oxygen dependent at baseline.  2/2 RSV with acute asthma exacerbation  -Currently on 1L 02 via NC- can wean at SNF  --Option of BiPAP 15/8 PRN respiratory distress/increased work of  breathing  -Encourage incentive spirometer use while hospitalized  -OOB to chair  - Pulm following-cleared for DC  - continue home: montelukast 10 mg po daily     # Asthma, acutely exacerbated  --Maintained on high-dose Breo Ellipta, Singulair, and Albuterol PRN at home  -S/p Azithromycin 500mg IV x3 doses, course completed on 1/6  -Mucinex 1,200mg PO BID  -Singulair 10mg PO qHS  - Solumedrol -> Prednisone taper, 40 mg PO daily x3 days, reduce by 10 mg every 3 days  -Out-patient follow up w/ Dr. Mcdaniel upon discharge  - continue home: montelukast 10 mg po daily     # RSV infection  -PCR positive on 1/4  -Supportive care  -Leukocytosis present on labs, suspected to be secondary to steroids     # Diabetes mellitus type 2  -Maintained on Januvia out-patient.  Hyperglycemic at presentation, likely due to steroid use PTA  --Humalog 5 units qAC  ---SSI for additional coverage  -HgB A1C of 7.5% as of 1/4/25  - holding home: metFORMIN tablet po, sitagliptin 100 mg po     # Acute myocardial injury, elevated troponin  - In the setting of asthma exacertbaion  -P/w elevated troponin and ischemic changes on EKG concerning for subendocardial ischemia. Denies chest pain. Presentation not c/w ACS  --HST 1,645.3 -> 1,848.3 -> 1,603.8  -Cardiology input appreciated  --S/p Heparin gtt x48 hours.  -Out-patient Coumadin on hold-resumed 1/8-held 1/9 due to persistent oozing from rt radial site-can be resumed 1/11 per cardio---> restarted today 1/11  - cardiac catheterization 1/8 with Angiographically patent epicardial coronary arteries  -2D echo from 1/6 w/ mild cLVH and EF 55%. A small portion of the the anterolateral wall appears to be markedly hypokinetic. Mild TR. RVSP = 63 mmHg. Mildly dilated RA, moderately dilated LA  -C/w ASA & Statin therapies     # Hyperlipidemia  -Maintained on Atorvastatin out-patient  -FLP from 1/4 w/ Chol 100, Tg 90, HDL 36, LDL 46  -Dosing increased. Continue while hospitalized     # Atrial  fibrillation  -Maintained on Coumadin out-patient. Rates currently controlled, cont BB  -S/p Heparin gtt as outlined above.warfrain held for 48 hrs for persistent oozing from radial site  -Cardiology following- restarted coumadin today 1/11 per cards     # Hyponatremia: mild, resolved  -Na levels of 128 at presentation, 138 today  -Free water restriction  -Daily BMP     # Hypertension  -Does not appear to have a formal h/o hypertension. BB had been held at presentation. BP noted to be elevated on 1/6 per nursing, ranging 147-200/65-81mmHg  -Hydralazine 10mg IVP q6 hours PRN SBP >160mmHg  -Cardiology following  --Toprol XL 50mg pO qDay, cont norvasc  -Monitor BP     # Obesity  -Body mass index is 30.32 kg/m².  -Complicates medical care. Diet and exercise w/ a goal of weight loss to a more ideal body weight encouraged     # Hyperkalemia: resolved  - potassium: 4.1 (1/12/25) down from 4.4 (1/11/25), high 5.9 (1/9/25)  -holding Valsrtan  -am BMP  - currently on: sodium zirconium cyclosilicate 10 g po daily     # Hypomagnesemia: acute, resolved  resolved     # Anemia: stable, normocytic  - hemoglobin: 10.2 (1/12/25) down from 10.3 (1/11/25)       VTE Prophylaxis:  Current anticoagulants:  warfarin (COUMADIN) therapy by pharmacy protocol, Other, evaluate daily      Code Status: Full Code        Estimated Discharge Date: 1/13/2025     Disposition Planning: Awaiting SNF placement       Giovani Wilkerson DO  1/13/2025

## 2025-01-13 NOTE — PROGRESS NOTES
" Pulmonary Daily Progress Note    SUBJECTIVE: Patient seen and examined.  No acute complaints offered today.  Continues to feel as if she is slowly improving daily.  Prior notes indicate possible discharge to SNF today.  Sating well on 1L of low flow O2.  No events noted overnight.    Allergies:   Reglan [metoclopramide hcl], Amoxicillin, Bactrim [sulfamethoxazole-trimethoprim], Formaldehyde, and Latex    Review of Systems:  Review of Systems   Respiratory:  Positive for cough. Negative for shortness of breath and wheezing.    Cardiovascular:  Negative for chest pain.   Gastrointestinal:  Negative for abdominal pain.   All other systems reviewed and are negative.      Input/Ouput in Last 24 hours:  No intake or output data in the 24 hours ending 01/13/25 0937    Objective     Vital Signs for the last 24 hours:  Visit Vitals  /87 (BP Location: Left upper arm, Patient Position: Lying)   Pulse (!) 56   Temp 36.4 °C (97.6 °F) (Temporal)   Resp 16   Ht 1.473 m (4' 10\")   Wt 112 kg (246 lb 14.6 oz)   SpO2 100%   BMI 51.61 kg/m²     Oxygen Therapy: Supplemental oxygen (1 liter via nasal cannula)    Physical Exam:  Physical Exam  Vitals and nursing note reviewed.   Constitutional:       General: She is not in acute distress.     Appearance: She is well-developed.   HENT:      Head: Normocephalic and atraumatic.   Eyes:      General: No scleral icterus.     Pupils: Pupils are equal, round, and reactive to light.   Neck:      Trachea: No tracheal deviation.   Cardiovascular:      Rate and Rhythm: Regular rhythm. Bradycardia present.      Heart sounds: Normal heart sounds.   Pulmonary:      Effort: Pulmonary effort is normal.      Breath sounds: Wheezing (Faint) present.   Abdominal:      General: Bowel sounds are normal.      Palpations: Abdomen is soft.      Tenderness: There is no abdominal tenderness.   Musculoskeletal:         General: Normal range of motion.   Skin:     General: Skin is warm.      Findings: " Bruising (Right hand/forearm) present.   Neurological:      Mental Status: She is alert and oriented to person, place, and time. Mental status is at baseline.          LABS:  Results from last 7 days   Lab Units 01/12/25  0910 01/11/25  0459 01/10/25  0512   SODIUM mEQ/L 140 141 138   CO2 mEQ/L 33* 33* 32*   BUN mg/dL 51* 63* 60*   CALCIUM mg/dL 8.0* 8.2* 8.2*     Results from last 7 days   Lab Units 01/12/25  0910 01/11/25  0459 01/10/25  0512   WBC K/uL 18.09*  18.09* 17.04* 18.49*   PLATELETS K/uL 260  260 247 266     Lab Results   Component Value Date    WBC 18.09 (H) 01/12/2025    WBC 18.09 (H) 01/12/2025    HGB 10.2 (L) 01/12/2025    HGB 10.2 (L) 01/12/2025    HCT 32.4 (L) 01/12/2025    HCT 32.4 (L) 01/12/2025     01/12/2025     01/12/2025    CHOL 100 01/04/2025    TRIG 90 01/04/2025    HDL 36 (L) 01/04/2025    ALT 11 01/04/2025    AST 46 (H) 01/04/2025     01/12/2025    K 4.1 01/12/2025     01/12/2025    CREATININE 0.9 01/12/2025    BUN 51 (H) 01/12/2025    CO2 33 (H) 01/12/2025    INR 1.8 01/12/2025    HGBA1C 7.5 (H) 01/04/2025         Current Facility-Administered Medications:     acetaminophen (TYLENOL) tablet 650 mg, 650 mg, oral, q4h PRN, Juwan Mcdaniel DO, 650 mg at 01/09/25 0818    amLODIPine (NORVASC) tablet 2.5 mg, 2.5 mg, oral, Daily, Cilia, Bailey A, CRNP, 2.5 mg at 01/13/25 0923    atorvastatin (LIPITOR) tablet 10 mg, 10 mg, oral, Daily, Cilia, Bailey A, CRNP, 10 mg at 01/13/25 0923    benzonatate (TESSALON) capsule 100 mg, 100 mg, oral, 3x daily PRN, Juwan Mcdaniel DO, 100 mg at 01/07/25 0615    glucose chewable tablet 16-32 g of dextrose, 16-32 g of dextrose, oral, PRN **OR** dextrose 40 % oral gel 15-30 g of dextrose, 15-30 g of dextrose, oral, PRN **OR** glucagon (GLUCAGEN) injection 1 mg, 1 mg, intramuscular, PRN **OR** dextrose 50 % in water (D50) injection 12.5 g, 25 mL, intravenous, PRN, Juwan Mcdaniel DO    digoxin (LANOXIN) tablet 125 mcg, 125 mcg, oral,  Daily, Cilia, Bailey A, CRNP, 125 mcg at 01/13/25 0923    famotidine (PEPCID) tablet 20 mg, 20 mg, oral, Nightly, Juwan Mcdaniel, DO, 20 mg at 01/12/25 2055    guaiFENesin (MUCINEX) 12 hr ER tablet 1,200 mg, 1,200 mg, oral, BID, Juwan Mcdaniel, DO, 1,200 mg at 01/13/25 0922    hydrALAZINE (APRESOLINE) injection 10 mg, 10 mg, intravenous, q6h PRN, Juwan Mcdaniel, DO, 10 mg at 01/08/25 0407    insulin lispro U-100 (HumaLOG) pen 3-5 Units, 3-5 Units, subcutaneous, Before meals & nightly, Juwan Mcdaniel, DO, 3 Units at 01/12/25 1247    insulin lispro U-100 (HumaLOG) pen 5 Units, 5 Units, subcutaneous, TID with meals, Juwan Mcdaniel, DO, 5 Units at 01/13/25 0930    ipratropium-albuteroL (DUO-NEB) 0.5-2.5 mg/3 mL nebulizer solution 3 mL, 3 mL, nebulization, q4h PRN, Juwan Mcdaniel, DO, 3 mL at 01/09/25 2119    metoprolol succinate XL (TOPROL-XL) 24 hr ER tablet 50 mg, 50 mg, oral, Daily, Juwan Mcdaniel, DO, 50 mg at 01/13/25 0923    montelukast (SINGULAIR) tablet 10 mg, 10 mg, oral, Nightly, Juwan Mcdaniel E, DO, 10 mg at 01/12/25 2055    ondansetron (ZOFRAN) injection 4 mg, 4 mg, intravenous, q6h PRN, Juwan Mcdaniel, DO, 4 mg at 01/04/25 1554    polyethylene glycol (MIRALAX) 17 gram packet 17 g, 17 g, oral, Daily, Juwan Mcdaniel E, DO, 17 g at 01/13/25 0923    predniSONE (DELTASONE) tablet 40 mg, 40 mg, oral, Daily, Juwan Mcdaniel, DO, 40 mg at 01/13/25 0923    sennosides-docusate sodium (SENOKOT-S) 8.6-50 mg per tablet 1 tablet, 1 tablet, oral, Nightly, Juwan Mcdaniel DO, 1 tablet at 01/12/25 2055    [COMPLETED] sodium zirconium cyclosilicate (LOKELMA) oral powder packet 10 g, 10 g, oral, q8h INT, 10 g at 01/09/25 2041 **FOLLOWED BY** sodium zirconium cyclosilicate (LOKELMA) oral powder packet 10 g, 10 g, oral, Daily, Jaswant Anderson DO, 10 g at 01/11/25 1018    warfarin (COUMADIN) therapy by pharmacy protocol, , Other, evaluate daily, Albert Rico MD      Imaging/Radiology:  No new chest  imaging available to review      IMPRESSION AND PLAN :    Acute respiratory insufficiency w/ hypoxia, improving  -Not oxygen dependent at baseline.  Hypoxic at presentation secondary to RSV infection resulting in an asthma exacerbation  --Chest x-ray from 1/4 w/ clear lung fields  -Weaned to 1 liter of low flow O2 at rest.  Continue efforts to wean supplemental oxygen as tolerated.  Titrate FiO2 to maintain SpO2 >=90%  --Option of BiPAP 12/8 PRN respiratory distress/increased work of breathing  -Encourage incentive spirometer use while hospitalized  -OOB to chair.  Mobilize as tolerated  --PT/OT following  -Anticipate that she will need an O2 evaluation if discharge is to home otherwise, would expect O2 to be weaned to off at SNF prior to return to home     Asthma, acutely exacerbated  -Follows w/ Dr. Mcdaniel.  Last seen 11/22/24.  Spirometry at that time w/ FVC 1.07L (57%), FEV1 0.79L (66%), FEV1/FVC 74% c/w small airways disease and co-existing restriction  --Maintained on high-dose Breo Ellipta, Singulair, and Albuterol PRN  --Started on a Z-krys and a tapering course of Prednisone for an acute flare on 1/2 after telephone consultation w/ Dr. Mcdaniel  -P/w severe bronchospasm in the setting of RSV induced asthma exacerbation  -S/p Azithromycin 500mg IV x3 doses, course completing on 1/6  -Mucinex 1,200mg PO BID  -Duonebs q4 hours PRN  -Singulair 10mg PO qHS  -Prednisone 40mg PO qDay. Decrease to 30mg and continue to taper as follows;  --Prednisone 30mg x3, 20mg x3, 10mg x3, then STOP  -Out-patient follow up w/ Dr. Mcdaniel upon discharge  --Next scheduled appt is 5/9/25 at 1:15pm.  This can be rescheduled as necessary      RSV infection  -PCR positive on 1/4  -Contact and Droplet precautions per guidelines  -Supportive care  -Leukocytosis present and persistent on labs, suspected to be secondary to steroid induced demargination  --WBC: 19.1 -> 18.4 -> 17.0 -> 18.0K      Elevated troponin/NSTEMI  -P/w elevated troponin  and ischemic changes on EKG concerning for subendocardial ischemia.  Denies chest pain.  Presentation not c/w ACS.  Troponin bump likely secondary to respiratory distress  --HST 1,645.3 -> 1,848.3 -> 1,603.8  -Cardiology input appreciated  --S/p Heparin gtt x48 hours  --S/p LHC on 1/8.  Findings of angiographically patent epicardial coronary arteries   -2D echo from 1/6 w/ mild cLVH and EF 55%.  A small portion of the the anterolateral wall appears to be markedly hypokinetic.  Mild TR.  RVSP = 63 mmHg. Mildly dilated RA, moderately dilated LA  -C/w ASA & Statin therapies      Atrial fibrillation  -Maintained on Coumadin out-patient.  Rates currently controlled  -S/p Heparin gtt as outlined above.  Was on therapeutic Lovenox for atrial fibrillation as a bridge.  Coumadin administered in PM of 1/8 but again on hold in light of severe RUE ecchymosis.  Coumadin resumed on 1/11  --INR 1.8 as of yesterday  -Cardiology has evaluated  -Monitor telemetry      Obesity  -Body mass index is 30.32 kg/m².  -Complicates medical care.  Diet and exercise w/ a goal of weight loss to a more ideal body weight encouraged    Remains stable for discharge from a pulmonary standpoint when otherwise medically ready     Diet: Regular, 1800 carb, renal/cardiac, 1,500mL fluid  DVT prophylaxis: Coumadin with INR goal 2-3.  INR 1.8 on 1/12  Therapies: PT, OT  Code status: Full Code    Case d/w: Patient

## 2025-01-14 VITALS
HEART RATE: 48 BPM | BODY MASS INDEX: 31.24 KG/M2 | HEIGHT: 58 IN | TEMPERATURE: 97 F | OXYGEN SATURATION: 96 % | RESPIRATION RATE: 16 BRPM | DIASTOLIC BLOOD PRESSURE: 60 MMHG | WEIGHT: 148.81 LBS | SYSTOLIC BLOOD PRESSURE: 126 MMHG

## 2025-01-14 LAB
ANION GAP SERPL CALC-SCNC: 3 MEQ/L (ref 3–15)
BASOPHILS # BLD: 0.01 K/UL (ref 0.01–0.1)
BASOPHILS NFR BLD: 0.1 %
BUN SERPL-MCNC: 33 MG/DL (ref 7–25)
CALCIUM SERPL-MCNC: 7.7 MG/DL (ref 8.6–10.3)
CHLORIDE SERPL-SCNC: 102 MEQ/L (ref 98–107)
CO2 SERPL-SCNC: 34 MEQ/L (ref 21–31)
CREAT SERPL-MCNC: 0.7 MG/DL (ref 0.6–1.2)
DIFFERENTIAL METHOD BLD: ABNORMAL
EGFRCR SERPLBLD CKD-EPI 2021: >60 ML/MIN/1.73M*2
EOSINOPHIL # BLD: 0.05 K/UL (ref 0.04–0.36)
EOSINOPHIL NFR BLD: 0.4 %
ERYTHROCYTE [DISTWIDTH] IN BLOOD BY AUTOMATED COUNT: 13.1 % (ref 11.7–14.4)
GLUCOSE BLD-MCNC: 220 MG/DL (ref 70–99)
GLUCOSE BLD-MCNC: 94 MG/DL (ref 70–99)
GLUCOSE SERPL-MCNC: 107 MG/DL (ref 70–99)
HCT VFR BLD AUTO: 27.2 % (ref 35–45)
HGB BLD-MCNC: 8.9 G/DL (ref 11.8–15.7)
IMM GRANULOCYTES # BLD AUTO: 0.12 K/UL (ref 0–0.08)
IMM GRANULOCYTES NFR BLD AUTO: 0.9 %
INR PPP: 3.1
LYMPHOCYTES # BLD: 2.11 K/UL (ref 1.2–3.5)
LYMPHOCYTES NFR BLD: 15.2 %
MAGNESIUM SERPL-MCNC: 2.1 MG/DL (ref 1.8–2.5)
MCH RBC QN AUTO: 30.5 PG (ref 28–33.2)
MCHC RBC AUTO-ENTMCNC: 32.7 G/DL (ref 32.2–35.5)
MCV RBC AUTO: 93.2 FL (ref 83–98)
MONOCYTES # BLD: 0.95 K/UL (ref 0.28–0.8)
MONOCYTES NFR BLD: 6.9 %
NEUTROPHILS # BLD: 10.6 K/UL (ref 1.7–7)
NEUTS SEG NFR BLD: 76.5 %
NRBC BLD-RTO: 0 %
PLATELET # BLD AUTO: 286 K/UL (ref 150–369)
PMV BLD AUTO: 10.8 FL (ref 9.4–12.3)
POCT TEST: ABNORMAL
POCT TEST: NORMAL
POTASSIUM SERPL-SCNC: 4.2 MEQ/L (ref 3.5–5.1)
PROTHROMBIN TIME: 31.7 SEC (ref 12.2–14.5)
RBC # BLD AUTO: 2.92 M/UL (ref 3.93–5.22)
SODIUM SERPL-SCNC: 139 MEQ/L (ref 136–145)
WBC # BLD AUTO: 13.84 K/UL (ref 3.8–10.5)

## 2025-01-14 PROCEDURE — 25000000 HC PHARMACY GENERAL: Performed by: HOSPITALIST

## 2025-01-14 PROCEDURE — 63700000 HC SELF-ADMINISTRABLE DRUG: Performed by: INTERNAL MEDICINE

## 2025-01-14 PROCEDURE — 36415 COLL VENOUS BLD VENIPUNCTURE: CPT | Performed by: NURSE PRACTITIONER

## 2025-01-14 PROCEDURE — 80048 BASIC METABOLIC PNL TOTAL CA: CPT | Performed by: INTERNAL MEDICINE

## 2025-01-14 PROCEDURE — 85025 COMPLETE CBC W/AUTO DIFF WBC: CPT | Performed by: INTERNAL MEDICINE

## 2025-01-14 PROCEDURE — 83735 ASSAY OF MAGNESIUM: CPT | Performed by: INTERNAL MEDICINE

## 2025-01-14 PROCEDURE — 99239 HOSP IP/OBS DSCHRG MGMT >30: CPT | Performed by: INTERNAL MEDICINE

## 2025-01-14 PROCEDURE — 63700000 HC SELF-ADMINISTRABLE DRUG: Performed by: NURSE PRACTITIONER

## 2025-01-14 PROCEDURE — 85610 PROTHROMBIN TIME: CPT | Performed by: NURSE PRACTITIONER

## 2025-01-14 RX ORDER — ACETAMINOPHEN 325 MG/1
650 TABLET ORAL EVERY 4 HOURS PRN
Start: 2025-01-14 | End: 2025-02-13

## 2025-01-14 RX ORDER — AMOXICILLIN 250 MG
1 CAPSULE ORAL NIGHTLY
Start: 2025-01-14 | End: 2025-04-07

## 2025-01-14 RX ORDER — GUAIFENESIN 600 MG/1
1200 TABLET, EXTENDED RELEASE ORAL 2 TIMES DAILY
Start: 2025-01-14 | End: 2025-04-05

## 2025-01-14 RX ORDER — PREDNISONE 10 MG/1
10 TABLET ORAL DAILY
Status: DISCONTINUED | OUTPATIENT
Start: 2025-01-20 | End: 2025-01-14 | Stop reason: HOSPADM

## 2025-01-14 RX ORDER — AMLODIPINE BESYLATE 2.5 MG/1
2.5 TABLET ORAL DAILY
Start: 2025-01-15 | End: 2025-02-14

## 2025-01-14 RX ORDER — BENZONATATE 100 MG/1
100 CAPSULE ORAL 3 TIMES DAILY PRN
Start: 2025-01-14 | End: 2025-01-24

## 2025-01-14 RX ORDER — FAMOTIDINE 20 MG/1
20 TABLET, FILM COATED ORAL NIGHTLY
Start: 2025-01-14 | End: 2025-02-13

## 2025-01-14 RX ORDER — IPRATROPIUM BROMIDE AND ALBUTEROL SULFATE 2.5; .5 MG/3ML; MG/3ML
3 SOLUTION RESPIRATORY (INHALATION) EVERY 4 HOURS PRN
Start: 2025-01-14 | End: 2025-02-13

## 2025-01-14 RX ORDER — PREDNISONE 20 MG/1
20 TABLET ORAL DAILY
Status: DISCONTINUED | OUTPATIENT
Start: 2025-01-17 | End: 2025-01-14 | Stop reason: HOSPADM

## 2025-01-14 RX ORDER — PREDNISONE 10 MG/1
TABLET ORAL
Start: 2025-01-15 | End: 2025-01-24

## 2025-01-14 RX ORDER — POLYETHYLENE GLYCOL 3350 17 G/17G
17 POWDER, FOR SOLUTION ORAL DAILY
Start: 2025-01-15 | End: 2025-02-06

## 2025-01-14 RX ADMIN — PREDNISONE 30 MG: 10 TABLET ORAL at 08:47

## 2025-01-14 RX ADMIN — INSULIN LISPRO 3 UNITS: 100 INJECTION, SOLUTION INTRAVENOUS; SUBCUTANEOUS at 12:39

## 2025-01-14 RX ADMIN — SODIUM ZIRCONIUM CYCLOSILICATE 10 G: 10 POWDER, FOR SUSPENSION ORAL at 08:49

## 2025-01-14 RX ADMIN — DIGOXIN 125 MCG: 125 TABLET ORAL at 08:47

## 2025-01-14 RX ADMIN — POLYETHYLENE GLYCOL 3350 17 G: 17 POWDER, FOR SOLUTION ORAL at 08:49

## 2025-01-14 RX ADMIN — INSULIN LISPRO 5 UNITS: 100 INJECTION, SOLUTION INTRAVENOUS; SUBCUTANEOUS at 12:40

## 2025-01-14 RX ADMIN — ATORVASTATIN CALCIUM 10 MG: 10 TABLET, FILM COATED ORAL at 08:45

## 2025-01-14 RX ADMIN — GUAIFENESIN 1200 MG: 600 TABLET, EXTENDED RELEASE ORAL at 08:48

## 2025-01-14 RX ADMIN — METOPROLOL SUCCINATE ER TABLETS 50 MG: 50 TABLET, FILM COATED, EXTENDED RELEASE ORAL at 08:46

## 2025-01-14 RX ADMIN — AMLODIPINE BESYLATE 2.5 MG: 2.5 TABLET ORAL at 08:46

## 2025-01-14 RX ADMIN — INSULIN LISPRO 5 UNITS: 100 INJECTION, SOLUTION INTRAVENOUS; SUBCUTANEOUS at 09:24

## 2025-01-14 ASSESSMENT — COGNITIVE AND FUNCTIONAL STATUS - GENERAL
WALKING IN HOSPITAL ROOM: 2 - A LOT
STANDING UP FROM CHAIR USING ARMS: 2 - A LOT
MOVING TO AND FROM BED TO CHAIR: 3 - A LITTLE
CLIMB 3 TO 5 STEPS WITH RAILING: 2 - A LOT

## 2025-01-14 NOTE — PLAN OF CARE
Care Coordination Discharge Plan Summary    Admission Assessment Summary    General Information  Readmission Within the last 30 days: no previous admission in last 30 days  Does patient have a :    Patient-Specific Goals (include timeframe): pt unable to state goal    Living Arrangements  Arrived From: home  Current Living Arrangements: home  People in Home: alone  Home Accessibility: not wheelchair accessible, stairs to enter home (Group), stairs within home (Group)  Living Arrangement Comments: Lives alone in a town home (row home) with one step to enter and full flight to second floor    Social Drivers of Health - Screenings  Housing Stability  In the last 12 months, was there a time when you were not able to pay the mortgage or rent on time?: No  In the past 12 months, how many times have you moved where you were living?: 0  At any time in the past 12 months, were you homeless or living in a shelter (including now)?: No  Utility Access  In the past 12 months has the electric, gas, oil, or water company threatened to shut off services in your home?: No  Transportation Needs  In the past 12 months, has lack of transportation kept you from medical appointments or from getting medications?: No  In the past 12 months, has lack of transportation kept you from meetings, work, or from getting things needed for daily living?: No    Functional Status Prior to Admission  Assistive Device/Animal Currently Used at Home: cane, straight  Functional Status Comments: Ambulates with a cane due to her knee pain, has put off getting surgery. Drives within the community.  IADL Comments: Independent    Discharge Needs Assessment    Concerns to be Addressed: care coordination/care conferences  Current Discharge Risk: physical impairment  Anticipated Changes Related to Illness: inability to care for self    Discharge Plan Summary    Patient Choice  Offered/Gave Vendor List: yes  Patient's Choice of Community Agency(s):  [facility]  Patient and/or patient guardian/advocate was made aware of their right to choose a provider. A list of eligible providers was presented and reviewed with the patient and/or patient guardian/advocate in written and/or verbal form. The list delineates providers in the patient’s desired geographic area who are participating in the Medicare program and/or providers contracted with the patient’s primary insurance. The Medicare list and quality ratings were obtained from the Medicare.gov [medicare.gov] website.    Concerns / Comments: SW confirmed jose Cedeno at [facility] that they have bed for pt today. Care team updated. Pt and Sister Anjali updated. No further SW interventions needed.    Discharge Plan:  Disposition/Destination: Skilled Nursing Facility - Other  / Skilled Nursing Facility  Destination - Admitted Since 1/4/2025       Service Provider Services Address Phone Fax Patient Preferred Last Updated    [facility] [address] [address] [phone] -- Jo Crawford, MSW 1/14/[phone]            Discharge Transportation:  Is Out of Hospital DNR needed at Discharge: no  Does patient need discharge transport? Yes  Discharge Transportation Vendor: [employer] ([phone]) (Trip #[id number])  Type of Transportation: basic life support  What day is the transport expected?: 01/14/25  What time is the transport expected?: 1200

## 2025-01-14 NOTE — PROGRESS NOTES
Warfarin Dosing by Pharmacy Consult Follow up    Ev Monzon is a 79 y.o. female who has been consulted for warfarin dosing for Atrial fibrillation.    Reviewed relevant clinical data including weight, previous warfarin doses, CBC and PT/INR levels:  PT   Date/Time Value Ref Range Status   01/14/2025 0427 31.7 (H) 12.2 - 14.5 sec Final   01/13/2025 1153 30.1 (H) 12.2 - 14.5 sec Final   01/12/2025 0910 20.7 (H) 12.2 - 14.5 sec Final     INR   Date/Time Value Ref Range Status   01/14/2025 0427 3.1   Final     Comment:     Moderate Intensity Anticoagulation = 2.0 to 3.0, High Intensity = 2.5 to 3.5   01/13/2025 1153 2.9   Final     Comment:     Moderate Intensity Anticoagulation = 2.0 to 3.0, High Intensity = 2.5 to 3.5   01/12/2025 0910 1.8   Final     Comment:     Moderate Intensity Anticoagulation = 2.0 to 3.0, High Intensity = 2.5 to 3.5       CBC Results         01/14/25 01/13/25 01/12/25     0427 1153 0910    WBC 13.84 18.87 18.09       18.09    RBC 2.92 3.13 3.41       3.41    HGB 8.9 9.4 10.2       10.2    HCT 27.2 29.3 32.4       32.4    MCV 93.2 93.6 95.0       95.0    MCH 30.5 30.0 29.9       29.9    MCHC 32.7 32.1 31.5       31.5     303 260       260               Warfarin Administrations (last 96 hours)       Date/Time Action Medication Dose    01/13/25 1819 No Dose Given    warfarin (COUMADIN) no dose today     01/12/25 1701 Given    warfarin (COUMADIN) tablet 5 mg 5 mg    01/11/25 1729 Given    warfarin (COUMADIN) tablet 5 mg 5 mg            Vitamin K Administrations (last 96 hours)       None            Assessment/Plan  The patient is ordered warfarin dosing by pharmacy.      Warfarin INR level 3.1 after holding dose.  INR goal: 2 - 3    Will hold warfarin.    Next INR:  1/15/25 @ 06:00    Patient is not receiving concurrent parenteral anticoagulation for bridging purposes.    Pharmacy will continue to follow the patient's warfarin dosing daily during this course of therapy.    Please call  INR levels to the pharmacy.  Amelia Guevara, PharmD

## 2025-01-14 NOTE — PLAN OF CARE
Plan of Care Review  Outcome Evaluation: pt aaxo3. forgetful at times. oobx1. 1L NC, plans to d/c today to [facility]. report called. droplet precautions maintained.

## 2025-01-14 NOTE — DISCHARGE SUMMARY
Hospital Medicine    Inpatient Discharge Summary        BRIEF OVERVIEW   Patient: Ev Monzon  Admission Date: 2025   : 1945 Discharge Date: 2025       PCP: Yasmani Guerrero MD  Disposition: Skilled Nursing Facility - Other     Destination: Skilled Nursing Facility     Attending Provider: Giovani Wilkerson DO Attending phys phone: [phone]    Code Status At Discharge: Full Code         ASSESSMENT AND PLAN   Problem List on the Day of Discharge  Assessment and Plan     # Acute respiratory insufficiency w/ hypoxia, improving  -Not oxygen dependent at baseline.  2/2 RSV with acute asthma exacerbation  -Currently on  via NC- can wean at SNF  --Option of BiPAP 15/8 PRN respiratory distress/increased work of breathing  -Encourage incentive spirometer use while hospitalized  -OOB to chair  - Pulm following-cleared for DC  - continue home: montelukast 10 mg po daily     # Asthma, acutely exacerbated  --Maintained on high-dose Breo Ellipta, Singulair, and Albuterol PRN at home  -S/p Azithromycin 500mg IV x3 doses, course completed on   -Mucinex 1,200mg PO BID  -Singulair 10mg PO qHS  - Solumedrol -> Prednisone taper, 40 mg PO daily x3 days, reduce by 10 mg every 3 days  -Out-patient follow up w/ Dr. Mcdaniel upon discharge  - continue home: montelukast 10 mg po daily     # RSV infection  -PCR positive on   -Supportive care  -Leukocytosis present on labs, suspected to be secondary to steroids     # Diabetes mellitus type 2  -Maintained on Januvia out-patient.  Hyperglycemic at presentation, likely due to steroid use PTA  --Humalog 5 units qAC  ---SSI for additional coverage  -HgB A1C of 7.5% as of 25  - holding home: metFORMIN tablet po, sitagliptin 100 mg po     # Acute myocardial injury, elevated troponin  - In the setting of asthma exacertbaion  -P/w elevated troponin and ischemic changes on EKG concerning for subendocardial ischemia. Denies chest pain. Presentation not  c/w ACS  --HST 1,645.3 -> 1,848.3 -> 1,603.8  -Cardiology input appreciated  --S/p Heparin gtt x48 hours.  -Out-patient Coumadin on hold-resumed 1/8-held 1/9 due to persistent oozing from rt radial site-can be resumed 1/11 per cardio---> restarted today 1/11  - cardiac catheterization 1/8 with Angiographically patent epicardial coronary arteries  -2D echo from 1/6 w/ mild cLVH and EF 55%. A small portion of the the anterolateral wall appears to be markedly hypokinetic. Mild TR. RVSP = 63 mmHg. Mildly dilated RA, moderately dilated LA  -C/w ASA & Statin therapies     # Hyperlipidemia  -Maintained on Atorvastatin out-patient  -FLP from 1/4 w/ Chol 100, Tg 90, HDL 36, LDL 46  -Dosing increased. Continue while hospitalized     # Atrial fibrillation  -Maintained on Coumadin out-patient. Rates currently controlled, cont BB  -S/p Heparin gtt as outlined above.warfrain held for 48 hrs for persistent oozing from radial site  -Cardiology following- restarted coumadin today 1/11 per cards     # Hyponatremia: mild, resolved  -Na levels of 128 at presentation, 138 today  -Free water restriction  -Daily BMP     # Hypertension  -Does not appear to have a formal h/o hypertension. BB had been held at presentation. BP noted to be elevated on 1/6 per nursing, ranging 147-200/65-81mmHg  -Hydralazine 10mg IVP q6 hours PRN SBP >160mmHg  -Cardiology following  --Toprol XL 50mg pO qDay, cont norvasc  -Monitor BP     # Obesity  -Body mass index is 30.32 kg/m².  -Complicates medical care. Diet and exercise w/ a goal of weight loss to a more ideal body weight encouraged     # Hyperkalemia: resolved  - potassium: 4.2 (1/14/25) down from 4.6 (1/13/25), high 5.9 (1/9/25)  -holding Valsrtan  -am BMP  - currently on: sodium zirconium cyclosilicate 10 g po daily     # Hypomagnesemia: acute, resolved  resolved     # Anemia: stable, normocytic  - hemoglobin: 8.9 (1/14/25) down from 9.4 (1/13/25)    Brief Hospital Course  Ev is an 79 year old  female with a history of asthma, a-fib on coumadin, DM2. Presented with shortness of breath. Recently started on a z-pack and steroids out patient for cough and wheezing. Tested positive for RSV in the ER. Admitted to the ICU for a severe asthma exacerbation. Now much better. Course complicated acute myocardial injury with troponin peak of 1848. Treated with 48h of a heparin gtt. Echocardiogram showed preserved LV function. Once respiratory status improved, taken for a LHC which showed patent coronary arteries. Placed on a free water restriction for hyponatremia, Na much better now. Developed hyperkalemia requiring several doses of Lokelma. Repeat labs in 3-5 days to check potassium levels.     Continues to require 1 L of oxygen via NC. Currently on a prednisone taper. Stable for discharge to SNF.     Exam on Day of Discharge  Temp:  [36.2 °C (97.2 °F)-36.6 °C (97.8 °F)] 36.2 °C (97.2 °F)  Heart Rate:  [47-62] 62  Resp:  [16] 16  BP: (114-144)/(56-80) 144/66    Physical Exam  General: Awake, alert, oriented x3, NAD, normal appearance  HENT: MMM  Head: Atraumatic, normocephalic  Eyes: PERRL, EOM are normal  Neck: Normal range of motion. Neck supple  Cardiovascular: RRR, +S1/S2, no MRG  Pulmonary/Chest: Pulmonary effort is normal, no WRR  Abdominal: BS+ Soft, NTND  Musculoskeletal: Normal range of motion  Neurological: No focal deficits, CN II-XII intact, at baseline  Skin: Skin is warm and dry  Psychiatric: Normal mood and affect. Thought content and judgement normal         DISCHARGE MEDICATIONS      Medication List        START taking these medications      acetaminophen 325 mg tablet  Commonly known as: TYLENOL  Take 2 tablets (650 mg total) by mouth every 4 (four) hours as needed for pain.  Dose: 650 mg     amLODIPine 2.5 mg tablet  Commonly known as: NORVASC  Start taking on: January 15, 2025  Take 1 tablet (2.5 mg total) by mouth daily.  Dose: 2.5 mg     benzonatate 100 mg capsule  Commonly known as:  TESSALON  Take 1 capsule (100 mg total) by mouth 3 (three) times a day as needed for cough for up to 10 days.  Dose: 100 mg     famotidine 20 mg tablet  Commonly known as: PEPCID  Take 1 tablet (20 mg total) by mouth nightly Indications: stress ulcer prevention.  Dose: 20 mg     guaiFENesin 600 mg 12 hr tablet  Commonly known as: MUCINEX  Take 2 tablets (1,200 mg total) by mouth 2 (two) times a day for 161 doses.  Dose: 1,200 mg     ipratropium-albuteroL 0.5-2.5 mg/3 mL nebulizer solution  Commonly known as: DUO-NEB  Take 3 mL by nebulization every 4 (four) hours as needed for wheezing or shortness of breath.  Dose: 3 mL     polyethylene glycol 17 gram packet  Commonly known as: MIRALAX  Start taking on: January 15, 2025  Take 17 g by mouth daily for 3 days.  Dose: 17 g     sennosides-docusate sodium 8.6-50 mg  Commonly known as: SENOKOT-S  Take 1 tablet by mouth nightly for 83 doses.  Dose: 1 tablet            CONTINUE taking these medications      atorvastatin 10 mg tablet  Commonly known as: LIPITOR  Take 10 mg by mouth once daily.  Dose: 10 mg     DIGOX 125 mcg (0.125 mg) tablet  Take 125 mcg by mouth daily.  Dose: 125 mcg  Generic drug: digoxin     JANUVIA 100 mg tablet  Take 100 mg by mouth daily.  Dose: 100 mg  Generic drug: SITagliptin phosphate     metFORMIN 500 mg tablet  Commonly known as: GLUCOPHAGE  Take 1,000 mg by mouth 2 (two) times a day with meals.  Dose: 1,000 mg     metoprolol succinate XL 50 mg 24 hr tablet  Commonly known as: TOPROL-XL  Take 50 mg by mouth daily.  Dose: 50 mg     montelukast 10 mg tablet  Commonly known as: SINGULAIR  Take 10 mg by mouth every evening.  Dose: 10 mg     * warfarin 2.5 mg tablet  Commonly known as: COUMADIN  Take 2.5 mg by mouth 2 (two) times a week (Mon, Thu).  Dose: 2.5 mg     * warfarin 2.5 mg tablet  Commonly known as: COUMADIN  Take 3.75 mg by mouth 5 (five) times a week.  Dose: 3.75 mg           * This list has 2 medication(s) that are the same as other  medications prescribed for you. Read the directions carefully, and ask your doctor or other care provider to review them with you.                ASK your doctor about these medications      * predniSONE 10 mg tablet  Commonly known as: DELTASONE  Take 6 tabs (60mg) daily for 2 days, then take 5 tabs (50mg) daily for 2 days. Continue to decrease by 1 tab (10mg) every 2 days until gone.  Ask about: Which instructions should I use?     * predniSONE 10 mg tablet  Commonly known as: DELTASONE  Start taking on: January 15, 2025  Take 3 tablets (30 mg total) by mouth daily for 2 days, THEN 2 tablets (20 mg total) daily for 3 days, THEN 1 tablet (10 mg total) daily for 3 days.  Ask about: Which instructions should I use?           * This list has 2 medication(s) that are the same as other medications prescribed for you. Read the directions carefully, and ask your doctor or other care provider to review them with you.                    INSTRUCTIONS AND FOLLOW-UP   Instructions for after discharge       Activity as Tolerated      Diet      Diet Type / Texture:  Regular  Diabetic (Low Carb/Low Fat)  Renal (2gm Sodium/2gm Potassium)  Cardiac (Low Sodium, Low Fat)       Occupational Therapy      Review hospital OT recommendations and plan of care    Physical Therapy      Review hospital PT recommendations and plan of care            Important Issues to Address in Follow-Up  Discharge Follow-up Issues: Instructions to Patient:   Additional Discharge Instructions    You were treated for an asthma exacerbation in the setting of an acute RSV infection    Complete the steroid taper as prescribed    Follow up with pulmonology at your scheduled visit    Patient wishes to transfer cardiovascular care to [facility] given convenience of location --> Follow-up 02/06 with Misty PITTMAN and 04/14 with Dr. Vazquez. [facility] will also manage Warfarin/INR.     Follow up with your primary care doctor within 1 week of discharge from  rehab           Scheduled Appointments            In 3 weeks [facility]    In 3 months Mustapha Vazquez MD [facility] at [facility]            Recommended Follow-up Visits   Follow-Up Providers       Yasmani Guerrero MD   Relationship: PCP - General        Follow up in 2 week(s)              After Discharge Care                Destination       [facility]   Skilled Nursing                                Test Results Pending at Discharge  Pending Inpatient Labs       Order Current Status    [place] Draw Panel In process             LABS AND IMAGING   Pertinent Labs      Pertinent Imaging  X-RAY CHEST 1 VIEW    Result Date: 1/4/2025  IMPRESSION: Cardiomegaly    Cardiac Imaging    TRANSTHORACIC ECHO (TTE) COMPLETE 01/06/2025    Interpretation Summary    Left Ventricle: Normal ventricle size. Mild concentric left ventricular hypertrophy. Low normal systolic function. Estimated EF 55%.  A small portion of the the anterolateral wall appears to be markedly hypokinetic.  The interventricular septum appears to move normally.  The remaining LV wall segments appear to contract normally. Diastolic function: patient in a-fib.    Aortic Valve: Tricuspid valve.  Sclerotic leaflets. Mild regurgitation. Mild to moderate stenosis. Peak velocity = 2.57 m/s. Mean gradient = 14.00 mmHg. Calculated area by cont eq = 1.44 cm2. Calculated dimensionless index = 0.46.    Tricuspid Valve: Structure is grossly normal. Mild regurgitation. Estimated RVSP = 63 mmHg.    Pulmonic Valve: Grossly normal structure. Mild regurgitation.    Right Atrium: Mildly perhaps moderately dilated atrium.    Left Atrium: Moderately dilated atrium.    Pericardium: No evidence of pericardial effusion.    Mitral Valve: Sclerotic mitral valve. Normal leaflet motion. Trace regurgitation.    Right Ventricle: Normal ventricle size. Normal systolic function.    Aorta: Aortic  root sclerotic. Sinuses of Valsalva sclerotic. Ascending aorta sclerotic.    IVC/SVC: Inferior vena cava is <2.1cm. Inferior vena cava collapses <50% during inspiration.    Left heart cath 1/8/2025  Angiographically patent epicardial coronary arteries          OTHER SERVICES PROVIDED   Consults During Admission  IP CONSULT TO CARDIOLOGY  IP CONSULT TO PULMONOLOGY/SLEEP MEDICINE  IP CONSULT TO CARDIOLOGY    Procedures Performed  Cardiac catheterization         Thank you for allowing the Division of Hospital Medicine to care for your patient.        >30 mins spent for coordination/counselling related to discharge plan, including final examination of patient, discussion regarding discharge instructions, reconciliation/prescription of medications, preparation of discharge records, etc.

## 2025-02-06 ENCOUNTER — OFFICE VISIT (OUTPATIENT)
Dept: CARDIOLOGY | Facility: CLINIC | Age: 80
End: 2025-02-06
Payer: MEDICARE

## 2025-02-06 VITALS
WEIGHT: 144 LBS | HEART RATE: 71 BPM | SYSTOLIC BLOOD PRESSURE: 130 MMHG | OXYGEN SATURATION: 91 % | HEIGHT: 58 IN | BODY MASS INDEX: 30.23 KG/M2 | DIASTOLIC BLOOD PRESSURE: 60 MMHG

## 2025-02-06 DIAGNOSIS — I48.21 PERMANENT ATRIAL FIBRILLATION (CMS/HCC): Primary | ICD-10-CM

## 2025-02-06 DIAGNOSIS — R60.0 BILATERAL LOWER EXTREMITY EDEMA: ICD-10-CM

## 2025-02-06 DIAGNOSIS — E78.5 DYSLIPIDEMIA: ICD-10-CM

## 2025-02-06 DIAGNOSIS — J44.1 OBSTRUCTIVE CHRONIC BRONCHITIS WITH EXACERBATION (CMS/HCC): ICD-10-CM

## 2025-02-06 DIAGNOSIS — I35.0 NONRHEUMATIC AORTIC VALVE STENOSIS: ICD-10-CM

## 2025-02-06 DIAGNOSIS — I10 ESSENTIAL HYPERTENSION, BENIGN: ICD-10-CM

## 2025-02-06 LAB
QRS DURATION: 84
QT INTERVAL: 428
QTC CALCULATION(BAZETT): 405
R AXIS: 103
T WAVE AXIS: 142
VENTRICULAR RATE: 54

## 2025-02-06 PROCEDURE — G8752 SYS BP LESS 140: HCPCS | Performed by: NURSE PRACTITIONER

## 2025-02-06 PROCEDURE — 99214 OFFICE O/P EST MOD 30 MIN: CPT | Performed by: NURSE PRACTITIONER

## 2025-02-06 PROCEDURE — 93010 ELECTROCARDIOGRAM REPORT: CPT | Performed by: NURSE PRACTITIONER

## 2025-02-06 PROCEDURE — G8754 DIAS BP LESS 90: HCPCS | Performed by: NURSE PRACTITIONER

## 2025-02-06 NOTE — LETTER
February 7, 2025     Yasmani Guerrero MD  [address]  [place] [address]    Patient: Ev Monzon  YOB: 1945  Date of Visit: 2/6/2025      Dear Dr. Guerrero:    Thank you for referring Ev Monzon to me for evaluation. Below are my notes for this consultation.    If you have questions, please do not hesitate to call me. I look forward to following your patient along with you.         Sincerely,        ZAIN Morse        CC: Magalis Alejandre, Ashley Morrow CRNP  2/7/2025  9:21 AM  Sign when Signing Visit       Cardiology  Office Progress Note         Reason for visit:   Chief Complaint   Patient presents with   • Follow-up       HPI    Ev Monzon is a 79 y.o. female who presents to the office for a hospital follow up.     She was previously followed by Dr. Coffman in [place].     TTE 3/7/23: EF 60-65%, NWMA, mild-moderate AS     Hospital Course:     - 78 y/o, PMH of afib on Warfarin, asthma/COPD, HTN, DM2 who presented on 1/4 with increasing shortness of breath despite outpatient steroids and Z-krys on 1/2. Admitted for RSV and asthma exacerbation. Cardiology consulted for elevated troponin.   - Transfer to telemetry on 1/7   - [facility] 1/8 with patent coronaries. Therapeutic Lovenox discontinued and Warfarin resumed.   - Warfarin on hold until 1/11 due to RUE ecchymosis/swelling after [facility]   - Free Water resstriction for hyponatremia  - she developed hyperkalemia which required Lokelma     Today:     She has been in rehab at [facility] where she is woring with PT. She continues to need oxygen. She reports taht she was without her Breo for a few weeks just after discharge but this has since resumed. She reports taht she had some chest tighthesss and more frequent need of her rescue during that time. She reports that her INR is being managed by the physician at [facility]. This was checked last week and was 4, was held. She reprots taht she had some worsening LE edema  recently that was treated with high dose /Lasix, titrated back with hyponatremia.     Past Medical History:   Diagnosis Date   • Asthma    • Atrial fibrillation (CMS/HCC)    • COPD (chronic obstructive pulmonary disease) (CMS/HCC)    • Diverticulitis     ruptured   • Type 2 diabetes mellitus (CMS/HCC)        Past Surgical History   Procedure Laterality Date   • Cataract extraction, bilateral     • Hernia repair     • Laparoscopic colon resection     • Left heart cath with coronary angiography N/A 1/8/2025    Performed by Carine Cho MD at  CARDIAC CATH/EP       Social History     Tobacco Use   • Smoking status: Never   • Smokeless tobacco: Never   Substance Use Topics   • Alcohol use: Yes     Comment: socially   • Drug use: No       No family history on file.    Allergies:  Reglan [metoclopramide hcl], Amoxicillin, Bactrim [sulfamethoxazole-trimethoprim], Formaldehyde, and Latex    Current Outpatient Medications   Medication Sig Dispense Refill   • acetaminophen (TYLENOL) 325 mg tablet Take 2 tablets (650 mg total) by mouth every 4 (four) hours as needed for pain.     • amLODIPine (NORVASC) 2.5 mg tablet Take 1 tablet (2.5 mg total) by mouth daily.     • atorvastatin (LIPITOR) 10 mg tablet Take 10 mg by mouth once daily.  5   • DIGOX 125 mcg tablet Take 125 mcg by mouth daily.       • famotidine (PEPCID) 20 mg tablet Take 1 tablet (20 mg total) by mouth nightly Indications: stress ulcer prevention.     • guaiFENesin (MUCINEX) 600 mg 12 hr tablet Take 2 tablets (1,200 mg total) by mouth 2 (two) times a day for 161 doses.     • ipratropium-albuteroL (DUO-NEB) 0.5-2.5 mg/3 mL nebulizer solution Take 3 mL by nebulization every 4 (four) hours as needed for wheezing or shortness of breath.     • JANUVIA 100 mg tablet Take 100 mg by mouth daily.       • metFORMIN (GLUCOPHAGE) 500 mg tablet Take 1,000 mg by mouth 2 (two) times a day with meals.       • metoprolol succinate XL (TOPROL-XL) 50 mg 24 hr tablet Take 50 mg  by mouth daily.       • montelukast (SINGULAIR) 10 mg tablet Take 10 mg by mouth every evening.    3   • polyethylene glycol (MIRALAX) 17 gram packet Take 17 g by mouth daily for 3 days.     • sennosides-docusate sodium (SENOKOT-S) 8.6-50 mg Take 1 tablet by mouth nightly for 83 doses.     • warfarin (COUMADIN) 2.5 mg tablet Take 2.5 mg by mouth 2 (two) times a week (Mon, Thu).   (Patient taking differently: Take 2.5 mg by mouth once daily.)     • warfarin (COUMADIN) 2.5 mg tablet Take 3.75 mg by mouth 5 (five) times a week. (Patient taking differently: Take 3.75 mg by mouth 5 (five) times a week.)     • predniSONE (DELTASONE) 10 mg tablet Take 3 tablets (30 mg total) by mouth daily for 2 days, THEN 2 tablets (20 mg total) daily for 3 days, THEN 1 tablet (10 mg total) daily for 3 days. (Patient not taking: Reported on 2/6/2025)       No current facility-administered medications for this visit.       Review of Systems   Cardiovascular:  Positive for dyspnea on exertion. Negative for chest pain, irregular heartbeat, leg swelling, palpitations and syncope.   Respiratory:  Positive for cough. Negative for shortness of breath.    Hematologic/Lymphatic: Does not bruise/bleed easily.   Neurological:  Negative for dizziness and light-headedness.       Objective    Vitals:    02/06/25 1048   BP: 130/60   Pulse: 71   SpO2: (!) 91%       Wt Readings from Last 5 Encounters:   02/06/25 65.3 kg (144 lb)   01/14/25 67.5 kg (148 lb 13 oz)   12/28/18 77.1 kg (170 lb)   12/24/18 77.1 kg (170 lb)       Body mass index is 30.1 kg/m².    Physical Exam  Vitals reviewed.   Constitutional:       General: She is not in acute distress.     Appearance: Normal appearance.   HENT:      Head: Normocephalic and atraumatic.   Eyes:      General: No scleral icterus.  Neck:      Vascular: No carotid bruit, hepatojugular reflux or JVD.   Cardiovascular:      Rate and Rhythm: Normal rate. Rhythm irregular.      Heart sounds: S1 normal and S2 normal.  No murmur heard.     No S3 or S4 sounds.   Pulmonary:      Effort: Pulmonary effort is normal.      Breath sounds: Examination of the right-upper field reveals rhonchi. Examination of the left-upper field reveals rhonchi. Rhonchi present.   Musculoskeletal:      Right lower leg: Edema (+1-2 LE) present.      Left lower leg: Edema (+1-2 LE) present.      Comments: In wheelchair   Skin:     General: Skin is warm and dry.   Neurological:      General: No focal deficit present.      Mental Status: She is alert. Mental status is at baseline.   Psychiatric:         Mood and Affect: Mood normal.         Thought Content: Thought content normal.         Judgment: Judgment normal.          ECG   Atrial fibrillation with slow ventricular response   Rightward axis   Nonspecific ST and T wave abnormality   Abnormal ECG   When compared with ECG of 06-JAN-2025 11:02,   Vent. rate has decreased BY  37 BPM   Questionable change in QRS axis     Hematology  Lab Results   Component Value Date    WBC 13.84 (H) 01/14/2025    HGB 8.9 (L) 01/14/2025    HCT 27.2 (L) 01/14/2025     01/14/2025    INR 3.1 01/14/2025       Chemistries  Lab Results   Component Value Date     01/14/2025    K 4.2 01/14/2025     01/14/2025    CREATININE 0.7 01/14/2025    BUN 33 (H) 01/14/2025    CO2 34 (H) 01/14/2025    GLUCOSE 107 (H) 01/14/2025    CALCIUM 7.7 (L) 01/14/2025    MG 2.1 01/14/2025    ALT 11 01/04/2025    AST 46 (H) 01/04/2025       Cholesterol  Lab Results   Component Value Date    CHOL 100 01/04/2025    TRIG 90 01/04/2025    HDL 36 (L) 01/04/2025    LDLCALC 46 01/04/2025    NONHDLCALC 64 01/04/2025       Endocrine  Lab Results   Component Value Date    HGBA1C 7.5 (H) 01/04/2025       Cardiac Imaging    TRANSTHORACIC ECHO (TTE) COMPLETE 01/06/2025    Interpretation Summary  •  Left Ventricle: Normal ventricle size. Mild concentric left ventricular hypertrophy. Low normal systolic function. Estimated EF 55%.  A small portion of the  the anterolateral wall appears to be markedly hypokinetic.  The interventricular septum appears to move normally.  The remaining LV wall segments appear to contract normally. Diastolic function: patient in a-fib.  •  Aortic Valve: Tricuspid valve.  Sclerotic leaflets. Mild regurgitation. Mild to moderate stenosis. Peak velocity = 2.57 m/s. Mean gradient = 14.00 mmHg. Calculated area by cont eq = 1.44 cm2. Calculated dimensionless index = 0.46.  •  Tricuspid Valve: Structure is grossly normal. Mild regurgitation. Estimated RVSP = 63 mmHg.  •  Pulmonic Valve: Grossly normal structure. Mild regurgitation.  •  Right Atrium: Mildly perhaps moderately dilated atrium.  •  Left Atrium: Moderately dilated atrium.  •  Pericardium: No evidence of pericardial effusion.  •  Mitral Valve: Sclerotic mitral valve. Normal leaflet motion. Trace regurgitation.  •  Right Ventricle: Normal ventricle size. Normal systolic function.  •  Aorta: Aortic root sclerotic. Sinuses of Valsalva sclerotic. Ascending aorta sclerotic.  •  IVC/SVC: Inferior vena cava is <2.1cm. Inferior vena cava collapses <50% during inspiration.      Most recent cardiac cath results:    LEFT HEART CATH WITH CORONARY ANGIOGRAPHY 01/08/2025 (Final) 1/8/2025    Conclusion  Angiographically patent epicardial coronary arteries    RECOMMENDATIONS:  Medical management with risk factor modification            Assessment/Plan    A-fib (CMS/[facility])  -History of chronic afib diagnosed in 2006, previously followed by Dr. Coffman in [place].   - Her afib is rate controlled today  - Continue same dose of digoxin and metoprolol  - Check Dig level  - Her warfarin was previously managed by Dr. Coffman and is currently being managed by Dr. Alejandre, her primary care physician while she is in rehab at [facility]. Ultimately, this will be transferred and managed through this office.  - She reports that her last INR was supratherapeutic and her dose of warfarin was adjusted accordingly.      Essential hypertension, benign  - Her blood pressure is acceptable. She is tolerating medications without side effects.   - Continue same medications.    - In addition to medications she should maintain a low-sodium, heart healthy diet.       Bilateral lower extremity edema  - She reports chronic LE edema, which had recently worsened and was treated with Lasix. This was reduced due to hyponatremia, however no labs were avaialble to review today.  - She has +1-2 lower extremity edema on exam however she does not have any other evidence of decompensated HF on exam. This is likely multifactorial including some chronic venous insufficiency, weight, limited mobility and prolonged time in dependent position.   - Continue same dose of Lasix for now  - Advised compression socks as well as elevating lower extremities when sitting. She was encouraged to keep low salt diet.     Nonrheumatic aortic valve stenosis  TTE 1/6/25: Mild CLVH, EF 55%, hypokinesis of anterolateral wall, mild-mod AS, mild AR, bi-atrial dilatation, mild TR  - She has some LE edema as noted but appears otherwise well compensated.   - Continue Lasix as noted  - Continue to follow with periodic surveillance imaging    Dyslipidemia  - Continue statin therapy  - Continue periodic surveillance with fasting labs.     Obstructive chronic bronchitis with exacerbation (CMS/HCC)  - Remains on 2 L NC, also persistent cough   - Recent exacerbation in setting of RSV  - Continue inhalers  - Follow up with pulmonology recommended      No orders of the defined types were placed in this encounter.      There are no discontinued medications.    Orders Placed This Encounter   Procedures   • [facility] MUSE ECG 12 lead (clinic performed)     Scheduling Instructions:      PLEASE USE THIS ORDER FOR ECG'S PERFORMED IN PHYSICIAN OFFICES     Order Specific Question:   Release to patient     Answer:   Immediate [1]       Follow Up Plans:  Return in about 2 months (around  4/6/2025).              ZAIN Morse  2/7/2025

## 2025-02-06 NOTE — PROGRESS NOTES
Cardiology  Office Progress Note         Reason for visit:   Chief Complaint   Patient presents with    Follow-up       HPI     Ev Monzon is a 79 y.o. female who presents to the office for a hospital follow up.     She was previously followed by Dr. Coffman in [place].     TTE 3/7/23: EF 60-65%, NWMA, mild-moderate AS     Hospital Course:     - 80 y/o, PMH of afib on Warfarin, asthma/COPD, HTN, DM2 who presented on 1/4 with increasing shortness of breath despite outpatient steroids and Z-krys on 1/2. Admitted for RSV and asthma exacerbation. Cardiology consulted for elevated troponin.   - Transfer to telemetry on 1/7   - [facility] 1/8 with patent coronaries. Therapeutic Lovenox discontinued and Warfarin resumed.   - Warfarin on hold until 1/11 due to RUE ecchymosis/swelling after [facility]   - Free Water resstriction for hyponatremia  - she developed hyperkalemia which required Lokelma     Today:     She has been in rehab at [facility] where she is woring with PT. She continues to need oxygen. She reports taht she was without her Breo for a few weeks just after discharge but this has since resumed. She reports taht she had some chest tighthesss and more frequent need of her rescue during that time. She reports that her INR is being managed by the physician at [facility]. This was checked last week and was 4, was held. She reprots taht she had some worsening LE edema recently that was treated with high dose /Lasix, titrated back with hyponatremia.     Past Medical History:   Diagnosis Date    Asthma     Atrial fibrillation (CMS/HCC)     COPD (chronic obstructive pulmonary disease) (CMS/HCC)     Diverticulitis     ruptured    Type 2 diabetes mellitus (CMS/HCC)        Past Surgical History   Procedure Laterality Date    Cataract extraction, bilateral      Hernia repair      Laparoscopic colon resection      Left heart cath with coronary angiography N/A 1/8/2025    Performed by Carine Cho MD at  CARDIAC CATH/EP        Social History     Tobacco Use    Smoking status: Never    Smokeless tobacco: Never   Substance Use Topics    Alcohol use: Yes     Comment: socially    Drug use: No       No family history on file.    Allergies:  Reglan [metoclopramide hcl], Amoxicillin, Bactrim [sulfamethoxazole-trimethoprim], Formaldehyde, and Latex    Current Outpatient Medications   Medication Sig Dispense Refill    acetaminophen (TYLENOL) 325 mg tablet Take 2 tablets (650 mg total) by mouth every 4 (four) hours as needed for pain.      amLODIPine (NORVASC) 2.5 mg tablet Take 1 tablet (2.5 mg total) by mouth daily.      atorvastatin (LIPITOR) 10 mg tablet Take 10 mg by mouth once daily.  5    DIGOX 125 mcg tablet Take 125 mcg by mouth daily.        famotidine (PEPCID) 20 mg tablet Take 1 tablet (20 mg total) by mouth nightly Indications: stress ulcer prevention.      guaiFENesin (MUCINEX) 600 mg 12 hr tablet Take 2 tablets (1,200 mg total) by mouth 2 (two) times a day for 161 doses.      ipratropium-albuteroL (DUO-NEB) 0.5-2.5 mg/3 mL nebulizer solution Take 3 mL by nebulization every 4 (four) hours as needed for wheezing or shortness of breath.      JANUVIA 100 mg tablet Take 100 mg by mouth daily.        metFORMIN (GLUCOPHAGE) 500 mg tablet Take 1,000 mg by mouth 2 (two) times a day with meals.        metoprolol succinate XL (TOPROL-XL) 50 mg 24 hr tablet Take 50 mg by mouth daily.        montelukast (SINGULAIR) 10 mg tablet Take 10 mg by mouth every evening.    3    polyethylene glycol (MIRALAX) 17 gram packet Take 17 g by mouth daily for 3 days.      sennosides-docusate sodium (SENOKOT-S) 8.6-50 mg Take 1 tablet by mouth nightly for 83 doses.      warfarin (COUMADIN) 2.5 mg tablet Take 2.5 mg by mouth 2 (two) times a week (Mon, Thu).   (Patient taking differently: Take 2.5 mg by mouth once daily.)      warfarin (COUMADIN) 2.5 mg tablet Take 3.75 mg by mouth 5 (five) times a week. (Patient taking differently: Take 3.75 mg by mouth 5  (five) times a week.)      predniSONE (DELTASONE) 10 mg tablet Take 3 tablets (30 mg total) by mouth daily for 2 days, THEN 2 tablets (20 mg total) daily for 3 days, THEN 1 tablet (10 mg total) daily for 3 days. (Patient not taking: Reported on 2/6/2025)       No current facility-administered medications for this visit.       Review of Systems   Cardiovascular:  Positive for dyspnea on exertion. Negative for chest pain, irregular heartbeat, leg swelling, palpitations and syncope.   Respiratory:  Positive for cough. Negative for shortness of breath.    Hematologic/Lymphatic: Does not bruise/bleed easily.   Neurological:  Negative for dizziness and light-headedness.       Objective     Vitals:    02/06/25 1048   BP: 130/60   Pulse: 71   SpO2: (!) 91%       Wt Readings from Last 5 Encounters:   02/06/25 65.3 kg (144 lb)   01/14/25 67.5 kg (148 lb 13 oz)   12/28/18 77.1 kg (170 lb)   12/24/18 77.1 kg (170 lb)       Body mass index is 30.1 kg/m².    Physical Exam  Vitals reviewed.   Constitutional:       General: She is not in acute distress.     Appearance: Normal appearance.   HENT:      Head: Normocephalic and atraumatic.   Eyes:      General: No scleral icterus.  Neck:      Vascular: No carotid bruit, hepatojugular reflux or JVD.   Cardiovascular:      Rate and Rhythm: Normal rate. Rhythm irregular.      Heart sounds: S1 normal and S2 normal. No murmur heard.     No S3 or S4 sounds.   Pulmonary:      Effort: Pulmonary effort is normal.      Breath sounds: Examination of the right-upper field reveals rhonchi. Examination of the left-upper field reveals rhonchi. Rhonchi present.   Musculoskeletal:      Right lower leg: Edema (+1-2 LE) present.      Left lower leg: Edema (+1-2 LE) present.      Comments: In wheelchair   Skin:     General: Skin is warm and dry.   Neurological:      General: No focal deficit present.      Mental Status: She is alert. Mental status is at baseline.   Psychiatric:         Mood and Affect:  Mood normal.         Thought Content: Thought content normal.         Judgment: Judgment normal.          ECG   Atrial fibrillation with slow ventricular response   Rightward axis   Nonspecific ST and T wave abnormality   Abnormal ECG   When compared with ECG of 06-JAN-2025 11:02,   Vent. rate has decreased BY  37 BPM   Questionable change in QRS axis     Hematology  Lab Results   Component Value Date    WBC 13.84 (H) 01/14/2025    HGB 8.9 (L) 01/14/2025    HCT 27.2 (L) 01/14/2025     01/14/2025    INR 3.1 01/14/2025       Chemistries  Lab Results   Component Value Date     01/14/2025    K 4.2 01/14/2025     01/14/2025    CREATININE 0.7 01/14/2025    BUN 33 (H) 01/14/2025    CO2 34 (H) 01/14/2025    GLUCOSE 107 (H) 01/14/2025    CALCIUM 7.7 (L) 01/14/2025    MG 2.1 01/14/2025    ALT 11 01/04/2025    AST 46 (H) 01/04/2025       Cholesterol  Lab Results   Component Value Date    CHOL 100 01/04/2025    TRIG 90 01/04/2025    HDL 36 (L) 01/04/2025    LDLCALC 46 01/04/2025    NONHDLCALC 64 01/04/2025       Endocrine  Lab Results   Component Value Date    HGBA1C 7.5 (H) 01/04/2025       Cardiac Imaging    TRANSTHORACIC ECHO (TTE) COMPLETE 01/06/2025    Interpretation Summary    Left Ventricle: Normal ventricle size. Mild concentric left ventricular hypertrophy. Low normal systolic function. Estimated EF 55%.  A small portion of the the anterolateral wall appears to be markedly hypokinetic.  The interventricular septum appears to move normally.  The remaining LV wall segments appear to contract normally. Diastolic function: patient in a-fib.    Aortic Valve: Tricuspid valve.  Sclerotic leaflets. Mild regurgitation. Mild to moderate stenosis. Peak velocity = 2.57 m/s. Mean gradient = 14.00 mmHg. Calculated area by cont eq = 1.44 cm2. Calculated dimensionless index = 0.46.    Tricuspid Valve: Structure is grossly normal. Mild regurgitation. Estimated RVSP = 63 mmHg.    Pulmonic Valve: Grossly normal  structure. Mild regurgitation.    Right Atrium: Mildly perhaps moderately dilated atrium.    Left Atrium: Moderately dilated atrium.    Pericardium: No evidence of pericardial effusion.    Mitral Valve: Sclerotic mitral valve. Normal leaflet motion. Trace regurgitation.    Right Ventricle: Normal ventricle size. Normal systolic function.    Aorta: Aortic root sclerotic. Sinuses of Valsalva sclerotic. Ascending aorta sclerotic.    IVC/SVC: Inferior vena cava is <2.1cm. Inferior vena cava collapses <50% during inspiration.      Most recent cardiac cath results:    LEFT HEART CATH WITH CORONARY ANGIOGRAPHY 01/08/2025 (Final) 1/8/2025    Conclusion  Angiographically patent epicardial coronary arteries    RECOMMENDATIONS:  Medical management with risk factor modification            Assessment/Plan     A-fib (CMS/[facility])  -History of chronic afib diagnosed in 2006, previously followed by Dr. Coffman in [place].   - Her afib is rate controlled today  - Continue same dose of digoxin and metoprolol  - Check Dig level  - Her warfarin was previously managed by Dr. Coffman and is currently being managed by Dr. Alejandre, her primary care physician while she is in rehab at [facility]. Ultimately, this will be transferred and managed through this office.  - She reports that her last INR was supratherapeutic and her dose of warfarin was adjusted accordingly.     Essential hypertension, benign  - Her blood pressure is acceptable. She is tolerating medications without side effects.   - Continue same medications.    - In addition to medications she should maintain a low-sodium, heart healthy diet.       Bilateral lower extremity edema  - She reports chronic LE edema, which had recently worsened and was treated with Lasix. This was reduced due to hyponatremia, however no labs were avaialble to review today.  - She has +1-2 lower extremity edema on exam however she does not have any other evidence of decompensated HF on exam. This is likely  multifactorial including some chronic venous insufficiency, weight, limited mobility and prolonged time in dependent position.   - Continue same dose of Lasix for now  - Advised compression socks as well as elevating lower extremities when sitting. She was encouraged to keep low salt diet.     Nonrheumatic aortic valve stenosis  TTE 1/6/25: Mild CLVH, EF 55%, hypokinesis of anterolateral wall, mild-mod AS, mild AR, bi-atrial dilatation, mild TR  - She has some LE edema as noted but appears otherwise well compensated.   - Continue Lasix as noted  - Continue to follow with periodic surveillance imaging    Dyslipidemia  - Continue statin therapy  - Continue periodic surveillance with fasting labs.     Obstructive chronic bronchitis with exacerbation (CMS/HCC)  - Remains on 2 L NC, also persistent cough   - Recent exacerbation in setting of RSV  - Continue inhalers  - Follow up with pulmonology recommended      No orders of the defined types were placed in this encounter.      There are no discontinued medications.    Orders Placed This Encounter   Procedures    [facility] MUSE ECG 12 lead (clinic performed)     Scheduling Instructions:      PLEASE USE THIS ORDER FOR ECG'S PERFORMED IN PHYSICIAN OFFICES     Order Specific Question:   Release to patient     Answer:   Immediate [1]       Follow Up Plans:  Return in about 2 months (around 4/6/2025).              ZAIN Morse  2/7/2025

## 2025-02-07 PROBLEM — R60.0 BILATERAL LOWER EXTREMITY EDEMA: Status: ACTIVE | Noted: 2025-02-07

## 2025-02-07 ASSESSMENT — ENCOUNTER SYMPTOMS
LIGHT-HEADEDNESS: 0
BRUISES/BLEEDS EASILY: 0
SHORTNESS OF BREATH: 0
COUGH: 1
IRREGULAR HEARTBEAT: 0
DYSPNEA ON EXERTION: 1
SYNCOPE: 0
PALPITATIONS: 0
DIZZINESS: 0

## 2025-02-07 NOTE — ASSESSMENT & PLAN NOTE
- She reports chronic LE edema, which had recently worsened and was treated with Lasix. This was reduced due to hyponatremia, however no labs were avaialble to review today.  - She has +1-2 lower extremity edema on exam however she does not have any other evidence of decompensated HF on exam. This is likely multifactorial including some chronic venous insufficiency, weight, limited mobility and prolonged time in dependent position.   - Continue same dose of Lasix for now  - Advised compression socks as well as elevating lower extremities when sitting. She was encouraged to keep low salt diet.

## 2025-02-07 NOTE — ASSESSMENT & PLAN NOTE
-History of chronic afib diagnosed in 2006, previously followed by Dr. Coffman in [place].   - Her afib is rate controlled today  - Continue same dose of digoxin and metoprolol  - Check Dig level  - Her warfarin was previously managed by Dr. Coffman and is currently being managed by Dr. Alejandre, her primary care physician while she is in rehab at [facility]. Ultimately, this will be transferred and managed through this office.  - She reports that her last INR was supratherapeutic and her dose of warfarin was adjusted accordingly.

## 2025-02-07 NOTE — ASSESSMENT & PLAN NOTE
- Remains on 2 L NC, also persistent cough   - Recent exacerbation in setting of RSV  - Continue inhalers  - Follow up with pulmonology recommended

## 2025-02-07 NOTE — ASSESSMENT & PLAN NOTE
TTE 1/6/25: Mild CLVH, EF 55%, hypokinesis of anterolateral wall, mild-mod AS, mild AR, bi-atrial dilatation, mild TR  - She has some LE edema as noted but appears otherwise well compensated.   - Continue Lasix as noted  - Continue to follow with periodic surveillance imaging

## 2025-02-07 NOTE — ASSESSMENT & PLAN NOTE
- Her blood pressure is acceptable. She is tolerating medications without side effects.   - Continue same medications.    - In addition to medications she should maintain a low-sodium, heart healthy diet.

## 2025-02-20 ENCOUNTER — TRANSCRIBE ORDERS (OUTPATIENT)
Dept: LAB | Age: 80
End: 2025-02-20

## 2025-02-20 ENCOUNTER — APPOINTMENT (OUTPATIENT)
Dept: LAB | Age: 80
End: 2025-02-20
Attending: NURSE PRACTITIONER
Payer: MEDICARE

## 2025-02-20 DIAGNOSIS — I48.91 A-FIB (CMS/HCC): ICD-10-CM

## 2025-02-20 DIAGNOSIS — I48.91 A-FIB (CMS/HCC): Primary | ICD-10-CM

## 2025-02-20 LAB
INR PPP: 1.6
PROTHROMBIN TIME: 18.5 SEC (ref 12.2–14.5)

## 2025-02-20 PROCEDURE — 85610 PROTHROMBIN TIME: CPT

## 2025-02-20 PROCEDURE — 36415 COLL VENOUS BLD VENIPUNCTURE: CPT

## 2025-02-21 ENCOUNTER — ANTICOAGULATION VISIT (OUTPATIENT)
Dept: CARDIOLOGY | Facility: CLINIC | Age: 80
End: 2025-02-21
Payer: MEDICARE

## 2025-02-21 DIAGNOSIS — Z79.01 LONG TERM (CURRENT) USE OF ANTICOAGULANTS: ICD-10-CM

## 2025-02-21 DIAGNOSIS — I48.91 ATRIAL FIBRILLATION, UNSPECIFIED TYPE (CMS/HCC): Primary | ICD-10-CM

## 2025-02-21 PROCEDURE — 93793 ANTICOAG MGMT PT WARFARIN: CPT | Performed by: INTERNAL MEDICINE

## 2025-02-21 NOTE — PROGRESS NOTES
Pt reports taking one 2.5mg tablet of Warfarin daily.     Has appt w/ [facility] on 4/14/25.    Saw Misty on 2/6/25 for Hospital F/U.    Please advise...

## 2025-02-21 NOTE — PROGRESS NOTES
The most recent INR is below the target therapeutic range.  Have the patient take an extra 1/2 tablet tonight and then resume the following regimen.    Friday 5 mg, other days 2.5 mg.   Recheck the PT/INR in 2 weeks.

## 2025-03-05 ENCOUNTER — TELEPHONE (OUTPATIENT)
Dept: CARDIOLOGY | Facility: CLINIC | Age: 80
End: 2025-03-05
Payer: MEDICARE

## 2025-03-05 NOTE — TELEPHONE ENCOUNTER
"Angelina, [facility] called in regards to the patient. She reports that the patient's PT/INR is due today, but she does not have the \"blue top\" tube currently to draw the PT/INR. She had inquired about a mobile lab coming out and drawing it. She had also asked if the PT/INR is needed today, she can come back later and draw it or have another nurse come out tomorrow to draw it.     Last INR 1.6 on 2/20/25. Per Dr. Crisostomo \"Have the patient take an extra 1/2 tablet tonight and then resume the following regimen. Friday 5 mg, other days 2.5 mg. Recheck the PT/INR in 2 weeks.\"     Per INR schedule, date of next INR in 3/7/25.     Please advise.     Angelina can be reached at [phone]  "

## 2025-03-05 NOTE — TELEPHONE ENCOUNTER
Called and spoke with Angelina. I advised her based off the INR scheudle which shows patient's next INR is due on 3/7/25. I advised her patient can either have the INR draw tomorrow or Friday. Angelina verbalized understanding of these instructions. and will relay the message to the patient.

## 2025-03-07 ENCOUNTER — ANTICOAGULATION VISIT (OUTPATIENT)
Dept: CARDIOLOGY | Facility: CLINIC | Age: 80
End: 2025-03-07
Payer: MEDICARE

## 2025-03-07 ENCOUNTER — LAB REQUISITION (OUTPATIENT)
Dept: LAB | Facility: HOSPITAL | Age: 80
End: 2025-03-07
Payer: MEDICARE

## 2025-03-07 DIAGNOSIS — Z79.01 LONG TERM (CURRENT) USE OF ANTICOAGULANTS: ICD-10-CM

## 2025-03-07 DIAGNOSIS — I48.20 CHRONIC ATRIAL FIBRILLATION, UNSPECIFIED (CMS/HCC): ICD-10-CM

## 2025-03-07 DIAGNOSIS — Z79.01 LONG TERM (CURRENT) USE OF ANTICOAGULANTS: Primary | ICD-10-CM

## 2025-03-07 DIAGNOSIS — I48.91 ATRIAL FIBRILLATION, UNSPECIFIED TYPE (CMS/HCC): ICD-10-CM

## 2025-03-07 LAB
INR PPP: 8
PROTHROMBIN TIME: 65.8 SEC (ref 12.2–14.5)

## 2025-03-07 PROCEDURE — 93793 ANTICOAG MGMT PT WARFARIN: CPT | Performed by: INTERNAL MEDICINE

## 2025-03-07 PROCEDURE — 85610 PROTHROMBIN TIME: CPT | Performed by: INTERNAL MEDICINE

## 2025-03-07 NOTE — PROGRESS NOTES
The last encounter included an error: stated critically low, should have been a critical high INR of 8.0.

## 2025-03-07 NOTE — PROGRESS NOTES
[facility] called with a critically high lab result for this pt. INR 8.0.    Spoke with the pt not on any new medications including antibiotics and there has been no change to her diet aside from salads. Pt confirmed her tabs on hand are 2.5 mg tabs and she has been taking the verified schedule we have on file. Pt told to hold warfarin until instructed to resume and get a recheck on Tuesday. Pt repeated back her instructions.

## 2025-03-07 NOTE — PROGRESS NOTES
INR supratherapeutic.  Patient should hold warfarin until our office tells him to restart it.  He should recheck the INR on Tuesday and wait for our recommendations.  If he has any bleeding or symptoms, he needs to go to the emergency department.

## 2025-03-10 ENCOUNTER — TELEPHONE (OUTPATIENT)
Dept: CARDIOLOGY | Facility: CLINIC | Age: 80
End: 2025-03-10
Payer: MEDICARE

## 2025-03-10 DIAGNOSIS — I48.0 PAF (PAROXYSMAL ATRIAL FIBRILLATION) (CMS/HCC): Primary | ICD-10-CM

## 2025-03-10 NOTE — TELEPHONE ENCOUNTER
"Pt called and left msg on clinical line stating that she has a ride to take her to [facility] lab at [place] 3/11/25 to have her INR repeated as per last instructions.     Spoke with pt.  She tells me the last [facility]CRN that came to draw her INR had difficulty because she is a \"hard stick.\"   She would like to go to [facility] outpatient lab going forward for her INR labs.    Pt reports she is continuing to hold her warfarin as instructed and has not experienced any s/sx of bleeding.   Will also contact [facility] to let her know above pt request.     Dr Vazquez-  Can we put orders in for pt to have labs done every 2 weeks and then a PRN order for weekly in between?   Pt has hx of Afib.     Please advise.   Thanks.   "

## 2025-03-10 NOTE — TELEPHONE ENCOUNTER
Left vm on pt cell [phone] letting pt know that Dr Vazquez did input orders for her INR to be done at [facility].    Asked she contact office if any additional questions.

## 2025-03-11 ENCOUNTER — APPOINTMENT (OUTPATIENT)
Dept: LAB | Age: 80
End: 2025-03-11
Attending: INTERNAL MEDICINE
Payer: MEDICARE

## 2025-03-11 DIAGNOSIS — I48.0 PAF (PAROXYSMAL ATRIAL FIBRILLATION) (CMS/HCC): ICD-10-CM

## 2025-03-11 LAB
INR PPP: 1.2
PROTHROMBIN TIME: 14.9 SEC (ref 12.2–14.5)

## 2025-03-11 PROCEDURE — 36415 COLL VENOUS BLD VENIPUNCTURE: CPT

## 2025-03-11 PROCEDURE — 85610 PROTHROMBIN TIME: CPT

## 2025-03-12 ENCOUNTER — ANTICOAGULATION VISIT (OUTPATIENT)
Dept: CARDIOLOGY | Facility: CLINIC | Age: 80
End: 2025-03-12
Payer: MEDICARE

## 2025-03-12 DIAGNOSIS — I48.91 ATRIAL FIBRILLATION, UNSPECIFIED TYPE (CMS/HCC): ICD-10-CM

## 2025-03-12 DIAGNOSIS — Z79.01 LONG TERM (CURRENT) USE OF ANTICOAGULANTS: Primary | ICD-10-CM

## 2025-03-12 PROCEDURE — 93793 ANTICOAG MGMT PT WARFARIN: CPT | Performed by: INTERNAL MEDICINE

## 2025-03-12 NOTE — PROGRESS NOTES
The patient's INR is subtherapeutic at 1.2.  Please asked the patient to begin taking 5 mg of warfarin Monday Wednesday Friday, and 2.5 on the remaining days.  Repeat INR 2 weeks.

## 2025-03-21 ENCOUNTER — HOSPITAL ENCOUNTER (OUTPATIENT)
Dept: RADIOLOGY | Age: 80
Discharge: HOME | End: 2025-03-21
Attending: FAMILY MEDICINE
Payer: MEDICARE

## 2025-03-21 ENCOUNTER — TRANSCRIBE ORDERS (OUTPATIENT)
Dept: RADIOLOGY | Age: 80
End: 2025-03-21

## 2025-03-21 ENCOUNTER — APPOINTMENT (OUTPATIENT)
Dept: LAB | Age: 80
End: 2025-03-21
Attending: FAMILY MEDICINE
Payer: MEDICARE

## 2025-03-21 ENCOUNTER — TRANSCRIBE ORDERS (OUTPATIENT)
Dept: LAB | Age: 80
End: 2025-03-21

## 2025-03-21 DIAGNOSIS — E11.29 TYPE 2 DIABETES MELLITUS WITH OTHER DIABETIC KIDNEY COMPLICATION: ICD-10-CM

## 2025-03-21 DIAGNOSIS — I11.0 HYPERTENSIVE HEART DISEASE WITH HEART FAILURE (CMS/HCC): ICD-10-CM

## 2025-03-21 DIAGNOSIS — C54.1 MALIGNANT NEOPLASM OF ENDOMETRIUM (CMS/HCC): ICD-10-CM

## 2025-03-21 DIAGNOSIS — I48.0 PAROXYSMAL ATRIAL FIBRILLATION (CMS/HCC): Primary | ICD-10-CM

## 2025-03-21 DIAGNOSIS — J45.20 MILD INTERMITTENT ASTHMA, UNCOMPLICATED: Primary | ICD-10-CM

## 2025-03-21 DIAGNOSIS — I48.0 PAROXYSMAL ATRIAL FIBRILLATION (CMS/HCC): ICD-10-CM

## 2025-03-21 DIAGNOSIS — J45.20 MILD INTERMITTENT ASTHMA, UNCOMPLICATED: ICD-10-CM

## 2025-03-21 LAB
ALBUMIN SERPL-MCNC: 4.2 G/DL (ref 3.5–5.7)
ALP SERPL-CCNC: 103 IU/L (ref 34–125)
ALT SERPL-CCNC: 9 IU/L (ref 7–52)
ANION GAP SERPL CALC-SCNC: 8 MEQ/L (ref 3–15)
AST SERPL-CCNC: 21 IU/L (ref 13–39)
BASOPHILS # BLD: 0.03 K/UL (ref 0.01–0.1)
BASOPHILS NFR BLD: 0.3 %
BILIRUB SERPL-MCNC: 0.7 MG/DL (ref 0.3–1.2)
BUN SERPL-MCNC: 26 MG/DL (ref 7–25)
CALCIUM SERPL-MCNC: 9.8 MG/DL (ref 8.6–10.3)
CHLORIDE SERPL-SCNC: 105 MEQ/L (ref 98–107)
CHOLEST SERPL-MCNC: 111 MG/DL
CO2 SERPL-SCNC: 25 MEQ/L (ref 21–31)
CREAT SERPL-MCNC: 0.6 MG/DL (ref 0.6–1.2)
CREAT UR-MCNC: 67.2 MG/DL
DIFFERENTIAL METHOD BLD: ABNORMAL
EGFRCR SERPLBLD CKD-EPI 2021: >60 ML/MIN/1.73M*2
EOSINOPHIL # BLD: 0.36 K/UL (ref 0.04–0.36)
EOSINOPHIL NFR BLD: 3.3 %
ERYTHROCYTE [DISTWIDTH] IN BLOOD BY AUTOMATED COUNT: 14.4 % (ref 11.7–14.4)
EST. AVERAGE GLUCOSE BLD GHB EST-MCNC: 126 MG/DL
GLUCOSE SERPL-MCNC: 138 MG/DL (ref 70–99)
HBA1C MFR BLD: 6 %
HCT VFR BLD AUTO: 33.6 % (ref 35–45)
HDLC SERPL-MCNC: 39 MG/DL
HDLC SERPL: 2.8 {RATIO}
HGB BLD-MCNC: 10.7 G/DL (ref 11.8–15.7)
IMM GRANULOCYTES # BLD AUTO: 0.04 K/UL (ref 0–0.08)
IMM GRANULOCYTES NFR BLD AUTO: 0.4 %
LDLC SERPL CALC-MCNC: 54 MG/DL
LYMPHOCYTES # BLD: 1.87 K/UL (ref 1.2–3.5)
LYMPHOCYTES NFR BLD: 17 %
MCH RBC QN AUTO: 29.2 PG (ref 28–33.2)
MCHC RBC AUTO-ENTMCNC: 31.8 G/DL (ref 32.2–35.5)
MCV RBC AUTO: 91.8 FL (ref 83–98)
MICROALBUMIN UR-MCNC: 462.7 MG/L
MICROALBUMIN/CREAT UR: 688.5 UG/MG
MONOCYTES # BLD: 0.84 K/UL (ref 0.28–0.8)
MONOCYTES NFR BLD: 7.6 %
NEUTROPHILS # BLD: 7.88 K/UL (ref 1.7–7)
NEUTS SEG NFR BLD: 71.4 %
NONHDLC SERPL-MCNC: 72 MG/DL
NRBC BLD-RTO: 0 %
PLATELET # BLD AUTO: 322 K/UL (ref 150–369)
PMV BLD AUTO: 10.1 FL (ref 9.4–12.3)
POTASSIUM SERPL-SCNC: 4.8 MEQ/L (ref 3.5–5.1)
PROT SERPL-MCNC: 7.1 G/DL (ref 6–8.2)
RBC # BLD AUTO: 3.66 M/UL (ref 3.93–5.22)
SODIUM SERPL-SCNC: 138 MEQ/L (ref 136–145)
T4 FREE SERPL-MCNC: 0.89 NG/DL (ref 0.58–1.64)
TRIGL SERPL-MCNC: 89 MG/DL
TSH SERPL DL<=0.05 MIU/L-ACNC: 1.46 MIU/L (ref 0.34–5.6)
WBC # BLD AUTO: 11.02 K/UL (ref 3.8–10.5)

## 2025-03-21 PROCEDURE — 84443 ASSAY THYROID STIM HORMONE: CPT

## 2025-03-21 PROCEDURE — 36415 COLL VENOUS BLD VENIPUNCTURE: CPT

## 2025-03-21 PROCEDURE — 80061 LIPID PANEL: CPT

## 2025-03-21 PROCEDURE — 84439 ASSAY OF FREE THYROXINE: CPT

## 2025-03-21 PROCEDURE — 83036 HEMOGLOBIN GLYCOSYLATED A1C: CPT

## 2025-03-21 PROCEDURE — 80053 COMPREHEN METABOLIC PANEL: CPT

## 2025-03-21 PROCEDURE — 85025 COMPLETE CBC W/AUTO DIFF WBC: CPT

## 2025-03-21 PROCEDURE — 71046 X-RAY EXAM CHEST 2 VIEWS: CPT

## 2025-03-21 PROCEDURE — 82043 UR ALBUMIN QUANTITATIVE: CPT

## 2025-03-25 ENCOUNTER — APPOINTMENT (OUTPATIENT)
Dept: LAB | Age: 80
End: 2025-03-25
Attending: INTERNAL MEDICINE
Payer: MEDICARE

## 2025-03-25 DIAGNOSIS — I48.0 PAF (PAROXYSMAL ATRIAL FIBRILLATION) (CMS/HCC): ICD-10-CM

## 2025-03-25 LAB
INR PPP: 2.5
PROTHROMBIN TIME: 26.7 SEC (ref 12.2–14.5)

## 2025-03-25 PROCEDURE — 85610 PROTHROMBIN TIME: CPT

## 2025-03-25 PROCEDURE — 36415 COLL VENOUS BLD VENIPUNCTURE: CPT

## 2025-03-26 ENCOUNTER — ANTICOAGULATION VISIT (OUTPATIENT)
Dept: CARDIOLOGY | Facility: CLINIC | Age: 80
End: 2025-03-26
Payer: MEDICARE

## 2025-03-26 DIAGNOSIS — Z79.01 LONG TERM (CURRENT) USE OF ANTICOAGULANTS: ICD-10-CM

## 2025-03-26 DIAGNOSIS — I48.91 ATRIAL FIBRILLATION, UNSPECIFIED TYPE (CMS/HCC): Primary | ICD-10-CM

## 2025-03-26 PROCEDURE — 93793 ANTICOAG MGMT PT WARFARIN: CPT | Performed by: INTERNAL MEDICINE

## 2025-03-26 NOTE — PROGRESS NOTES
Pt of Dr Vazquez.    Spoke with pt.  Advised INR is therapeutic. Continue current warfarin regimen. Recheck INR in 2 weeks.    Pt verbalized understanding.

## 2025-04-08 ENCOUNTER — APPOINTMENT (OUTPATIENT)
Dept: LAB | Age: 80
End: 2025-04-08
Attending: INTERNAL MEDICINE
Payer: MEDICARE

## 2025-04-08 DIAGNOSIS — I48.0 PAF (PAROXYSMAL ATRIAL FIBRILLATION) (CMS/HCC): ICD-10-CM

## 2025-04-08 LAB
INR PPP: 2.2
PROTHROMBIN TIME: 24.4 SEC (ref 12.2–14.5)

## 2025-04-08 PROCEDURE — 36415 COLL VENOUS BLD VENIPUNCTURE: CPT

## 2025-04-08 PROCEDURE — 85610 PROTHROMBIN TIME: CPT

## 2025-04-09 ENCOUNTER — ANTICOAGULATION VISIT (OUTPATIENT)
Dept: CARDIOLOGY | Facility: CLINIC | Age: 80
End: 2025-04-09
Payer: MEDICARE

## 2025-04-09 DIAGNOSIS — I48.91 ATRIAL FIBRILLATION, UNSPECIFIED TYPE (CMS/HCC): Primary | ICD-10-CM

## 2025-04-09 DIAGNOSIS — Z79.01 LONG TERM (CURRENT) USE OF ANTICOAGULANTS: ICD-10-CM

## 2025-04-09 PROCEDURE — 93793 ANTICOAG MGMT PT WARFARIN: CPT | Performed by: INTERNAL MEDICINE

## 2025-04-09 NOTE — PROGRESS NOTES
Called and could not get a hold of patient so I left a VM with entire INR message. Advised any questions to call us and left our message.

## 2025-04-09 NOTE — PROGRESS NOTES
The patient's INR is therapeutic at 2.2.  Please asked the patient to continue her current warfarin dosing and to repeat her INR in 1 month.   Paramedian Forehead Flap Text: A decision was made to reconstruct the defect utilizing an interpolation axial flap and a staged reconstruction.  A telfa template was made of the defect.  This telfa template was then used to outline the paramedian forehead pedicle flap.  The donor area for the pedicle flap was then injected with anesthesia.  The flap was excised through the skin and subcutaneous tissue down to the layer of the underlying musculature.  The pedicle flap was carefully excised within this deep plane to maintain its blood supply.  The edges of the donor site were undermined.   The donor site was closed in a primary fashion.  The pedicle was then rotated into position and sutured.  Once the tube was sutured into place, adequate blood supply was confirmed with blanching and refill.  The pedicle was then wrapped with xeroform gauze and dressed appropriately with a telfa and gauze bandage to ensure continued blood supply and protect the attached pedicle.

## 2025-04-11 NOTE — PROGRESS NOTES
Cardiology  Office Progress Note         Reason for visit:   Chief Complaint   Patient presents with    Follow-up       HPI     Ev Monzon is a 79 y.o. female who presents to the office for cardiovascular follow up and management of atrial fibrillation (on Coumadin), hypertension and AS. Other history includes COPD/asthma and type 2 diabetes.    She was initially seen in the office on 2/6/25 by Ruben Jay NP as a hospital follow up.     1/6/25 Echocardiogram:  -LVEF 55% with mild cLVH.  -A small portion of the the anterolateral wall appears to be markedly hypokinetic.   -Aortic Valve: Tricuspid valve. Sclerotic leaflets. Mild regurgitation. Mild to moderate stenosis. Peak velocity = 2.57 m/s. Mean gradient = 14.00 mmHg. Calculated area by cont eq = 1.44 cm2. Calculated dimensionless index = 0.46.   -Tricuspid Valve: Structure is grossly normal. Mild regurgitation. Estimated RVSP = 63 mmHg.   -Pulmonic Valve: Grossly normal structure. Mild regurgitation.  -Right Atrium: Mildly perhaps moderately dilated atrium.  -Left Atrium: Moderately dilated atrium.  -Pericardium: No evidence of pericardial effusion.  -Mitral Valve: Sclerotic mitral valve. Normal leaflet motion. Trace regurgitation.  -Right Ventricle: Normal ventricle size. Normal systolic function.  -Aorta: Aortic root sclerotic. Sinuses of Valsalva sclerotic. Ascending aorta sclerotic.    Admitted for RSV and asthma exacerbation on 1/4/25. Cardiology consulted for elevated troponin.   -Transfer to telemetry on 1/7   - [facility] 1/8 with patent coronaries. Therapeutic Lovenox discontinued and Warfarin resumed.   - Warfarin on hold until 1/11 due to RUE ecchymosis/swelling after C   - Free Water resstriction for hyponatremia  - She developed hyperkalemia which required Lokelma.     At this visit Misty made no changes to her medical regimen. We planned to stat managing her Coumadin. Edema was thought to be likely multifactorial including some chronic venous  insufficiency, weight, limited mobility and prolonged time in dependent position. Patient advised to limit salt intake and elevate extremities. No changes made to her medical regimen and advised to follow up in 2 months.     3/21/25 FLP: , TG 89, HDL 39, LDL 54.  3/21/25: Cr 0.6, Na 138, K 4.8.    Today she repots establishing care at our office. She reports that her she previously followed with Dr. Raghavendra Coffman since about 2011 for atrial fibrillation. Called and requested records from provider.     She reports that she decreased her Lasix 20 mg from daily to every other day. She has also be holding her digoxin as she was advised upon discharge due to a heart rate less than 60.     She reports that her shortness of breath has seem to be worse following hospitalization for respiratory failure. She has been off of oxygen therapy for the last 2 months.     Past Medical History:   Diagnosis Date    Asthma     Atrial fibrillation (CMS/HCC)     COPD (chronic obstructive pulmonary disease) (CMS/HCC)     Diverticulitis     ruptured    Type 2 diabetes mellitus (CMS/HCC)        Past Surgical History   Procedure Laterality Date    Cataract extraction, bilateral      Hernia repair      Laparoscopic colon resection      Left heart cath with coronary angiography N/A 1/8/2025    Performed by Carine Cho MD at  [facility] CATH/EP       Social History     Tobacco Use    Smoking status: Never    Smokeless tobacco: Never   Substance Use Topics    Alcohol use: Yes     Comment: socially    Drug use: No       No family history on file.    Allergies:  Reglan [metoclopramide hcl], Amoxicillin, Bactrim [sulfamethoxazole-trimethoprim], Formaldehyde, and Latex    Current Outpatient Medications   Medication Sig Dispense Refill    amLODIPine (NORVASC) 2.5 mg tablet Take 1 tablet (2.5 mg total) by mouth daily.      atorvastatin (LIPITOR) 10 mg tablet Take 10 mg by mouth once daily.  5    DIGOX 125 mcg (0.125 mg) tablet Take 125 mcg by  mouth 3 (three) times a week (Mon, Wed, Fri).      fexofenadine HCl (MUCINEX ALLERGY ORAL) Take by mouth.      fluticasone propionate (FLONASE) 50 mcg/actuation nasal spray Administer 1 spray into each nostril daily.      fluticasone-umeclidinium-vilanterol (TRELEGY ELLIPTA) 100-62.5-25 mcg blister with device powder for inhalation Inhale 1 puff daily.      furosemide (LASIX) 20 mg tablet Take 20 mg by mouth daily. (Patient taking differently: Take 20 mg by mouth daily.)      JANUVIA 100 mg tablet Take 100 mg by mouth daily.        lidocaine (ASPERCREME) 4 % adhesive patch,medicated topical patch Apply 1 patch topically daily. Remove & discard patch within 12 hours or as directed by prescriber.      metFORMIN (GLUCOPHAGE) 500 mg tablet Take 1,000 mg by mouth 2 (two) times a day with meals.        metoprolol succinate XL (TOPROL-XL) 50 mg 24 hr tablet Take 50 mg by mouth daily.        montelukast (SINGULAIR) 10 mg tablet Take 10 mg by mouth every evening.    3    sodium chloride 1 g tablet Take 1 g by mouth daily.      warfarin (COUMADIN) 2.5 mg tablet Take 2.5 mg by mouth 2 (two) times a week (Mon, Thu).   (Patient taking differently: Take 2.5 mg by mouth once daily.)      warfarin (COUMADIN) 2.5 mg tablet Take 3.75 mg by mouth 5 (five) times a week. (Patient taking differently: Take 3.75 mg by mouth 5 (five) times a week.)       No current facility-administered medications for this visit.       Review of Systems   Cardiovascular:  Positive for dyspnea on exertion and leg swelling. Negative for chest pain, near-syncope, palpitations and syncope.   Hematologic/Lymphatic: Negative for bleeding problem. Does not bruise/bleed easily.   Neurological:  Negative for dizziness and light-headedness.       Objective     Vitals:    04/14/25 1138   BP: 138/66   Pulse: 70   SpO2: (!) 91%       Wt Readings from Last 5 Encounters:   04/14/25 65.3 kg (144 lb)   02/06/25 65.3 kg (144 lb)   01/14/25 67.5 kg (148 lb 13 oz)   12/28/18  77.1 kg (170 lb)   12/24/18 77.1 kg (170 lb)       Body mass index is 30.1 kg/m².    Physical Exam  Vitals reviewed.   Constitutional:       General: She is not in acute distress.     Appearance: Normal appearance.   HENT:      Head: Normocephalic and atraumatic.   Eyes:      General: No scleral icterus.  Neck:      Vascular: No carotid bruit, hepatojugular reflux or JVD.   Cardiovascular:      Rate and Rhythm: Normal rate. Rhythm irregular.      Heart sounds: S1 normal and S2 normal. No murmur heard.     No S3 or S4 sounds.   Pulmonary:      Effort: Pulmonary effort is normal. No respiratory distress.   Musculoskeletal:      Right lower leg: Edema (+1-2 LE) present.      Left lower leg: Edema (+1-2 LE) present.      Comments: In wheelchair   Skin:     General: Skin is warm and dry.   Neurological:      General: No focal deficit present.      Mental Status: She is alert and oriented to person, place, and time. Mental status is at baseline.   Psychiatric:         Mood and Affect: Mood normal.         Thought Content: Thought content normal.         Judgment: Judgment normal.          ECG: Rate controlled atrial fibrillation    Hematology  Lab Results   Component Value Date    WBC 11.02 (H) 03/21/2025    HGB 10.7 (L) 03/21/2025    HCT 33.6 (L) 03/21/2025     03/21/2025    INR 2.2 04/08/2025       Chemistries  Lab Results   Component Value Date     03/21/2025    K 4.8 03/21/2025     03/21/2025    CREATININE 0.6 03/21/2025    BUN 26 (H) 03/21/2025    CO2 25 03/21/2025    GLUCOSE 138 (H) 03/21/2025    CALCIUM 9.8 03/21/2025    MG 2.1 01/14/2025    ALT 9 03/21/2025    AST 21 03/21/2025       Cholesterol  Lab Results   Component Value Date    CHOL 111 03/21/2025    TRIG 89 03/21/2025    HDL 39 (L) 03/21/2025    LDLCALC 54 03/21/2025    NONHDLCALC 72 03/21/2025       Endocrine  Lab Results   Component Value Date    TSH 1.46 03/21/2025    FREET4 0.89 03/21/2025    HGBA1C 6.0 (H) 03/21/2025       Cardiac  Imaging    TRANSTHORACIC ECHO (TTE) COMPLETE 01/06/2025    Interpretation Summary    Left Ventricle: Normal ventricle size. Mild concentric left ventricular hypertrophy. Low normal systolic function. Estimated EF 55%.  A small portion of the the anterolateral wall appears to be markedly hypokinetic.  The interventricular septum appears to move normally.  The remaining LV wall segments appear to contract normally. Diastolic function: patient in a-fib.    Aortic Valve: Tricuspid valve.  Sclerotic leaflets. Mild regurgitation. Mild to moderate stenosis. Peak velocity = 2.57 m/s. Mean gradient = 14.00 mmHg. Calculated area by cont eq = 1.44 cm2. Calculated dimensionless index = 0.46.    Tricuspid Valve: Structure is grossly normal. Mild regurgitation. Estimated RVSP = 63 mmHg.    Pulmonic Valve: Grossly normal structure. Mild regurgitation.    Right Atrium: Mildly perhaps moderately dilated atrium.    Left Atrium: Moderately dilated atrium.    Pericardium: No evidence of pericardial effusion.    Mitral Valve: Sclerotic mitral valve. Normal leaflet motion. Trace regurgitation.    Right Ventricle: Normal ventricle size. Normal systolic function.    Aorta: Aortic root sclerotic. Sinuses of Valsalva sclerotic. Ascending aorta sclerotic.    IVC/SVC: Inferior vena cava is <2.1cm. Inferior vena cava collapses <50% during inspiration.      Most recent cardiac cath results:    LEFT HEART CATH WITH CORONARY ANGIOGRAPHY 01/08/2025 (Final) 1/8/2025    Conclusion  Angiographically patent epicardial coronary arteries    RECOMMENDATIONS:  Medical management with risk factor modification            Assessment/Plan     Exertional dyspnea  The patient has chronic exertional dyspnea which she feels may have worsened over the last few months.  She did have an episode of respiratory syncytial virus requiring hospitalization at the beginning of the year and this may play a role.  In addition, she has chronic A-fib and states that taking  Lasix helps her breathing.  This suggests some degree of diastolic heart failure which is certainly a possibility.  She has had trouble with low serum sodiums in the past and she is on Lasix and so I asked her to have some blood work performed which will include serum chemistries as well as a BNP.  She will follow-up in the office again in 3 months.    Atrial fibrillation (CMS/HCC)  The patient has chronic atrial fibrillation and probably has some degree of diastolic dysfunction as a result.  She is taking low-dose Lasix and may take extra doses now and again.  She has had some trouble with her serum sodium and so we will recheck those numbers at this time.  She remains on chronic warfarin therapy and she requires usual blood work for surveillance.    Obstructive chronic bronchitis with exacerbation (CMS/HCC)  The patient has a history of bronchitis in the past and reports a history of asthma.  She follows with Dr. Mcdaniel of the pulmonary service.    Essential hypertension, benign  The patient's blood pressure is well controlled on the current medical regimen.  There are no apparent side effects from medications.  We will continue the current therapy without change.    Nonrheumatic aortic valve stenosis  The patient has mild to perhaps moderate aortic valvular stenosis and physical exam reveals a murmur consistent with that diagnosis.  We will follow her clinically and consider periodic repeat echocardiographic examinations.      New Medications Ordered This Visit   Medications    furosemide (LASIX) 20 mg tablet     Sig: Take 20 mg by mouth daily.    fluticasone-umeclidinium-vilanterol (TRELEGY ELLIPTA) 100-62.5-25 mcg blister with device powder for inhalation     Sig: Inhale 1 puff daily.    fluticasone propionate (FLONASE) 50 mcg/actuation nasal spray     Sig: Administer 1 spray into each nostril daily.    lidocaine (ASPERCREME) 4 % adhesive patch,medicated topical patch     Sig: Apply 1 patch topically daily.  Remove & discard patch within 12 hours or as directed by prescriber.    fexofenadine HCl (MUCINEX ALLERGY ORAL)     Sig: Take by mouth.    sodium chloride 1 g tablet     Sig: Take 1 g by mouth daily.    DIGOX 125 mcg (0.125 mg) tablet     Sig: Take 125 mcg by mouth 3 (three) times a week (Mon, Wed, Fri).       Medications Discontinued During This Encounter   Medication Reason    famotidine (PEPCID) 20 mg tablet Patient Discontinued Medication    ipratropium-albuteroL (DUO-NEB) 0.5-2.5 mg/3 mL nebulizer solution Patient Discontinued Medication    polyethylene glycol (MIRALAX) 17 gram packet Patient Discontinued Medication    predniSONE (DELTASONE) 10 mg tablet Patient Discontinued Medication    sennosides-docusate sodium (SENOKOT-S) 8.6-50 mg Patient Discontinued Medication    SODIUM CHLORIDE ORAL Duplicate order    DIGOX 125 mcg tablet        Orders Placed This Encounter   Procedures    Comprehensive metabolic panel     Standing Status:   Future     Expiration Date:   4/14/2026     Release to patient:   Immediate [1]    CBC and differential     Standing Status:   Future     Expiration Date:   4/14/2026     Release to patient:   Immediate [1]    B-type natriuretic peptide     Standing Status:   Future     Expiration Date:   4/14/2026     Release to patient:   Immediate [1]    Protime-INR     Standing Status:   Standing     Number of Occurrences:   26     Next Expected Occurrence:   4/14/2025     Expiration Date:   4/14/2026     Release to patient:   Immediate [1]    [facility] MUSE ECG 12 lead (clinic performed)     Scheduling Instructions:      PLEASE USE THIS ORDER FOR ECG'S PERFORMED IN PHYSICIAN OFFICES     Release to patient:   Immediate [1]       Follow Up Plans:  Return in about 3 months (around 7/14/2025) for Recheck.          Barbara MARIE am scribing for, and in the presence of, Mustapha Vazquez MD.    Mustapha MARIE MD, personally performed the services described in this documentation as  scribed by Barbara Moore in my presence, and it is both accurate and complete.       Mustapha Vazquez MD  4/14/2025

## 2025-04-14 ENCOUNTER — OFFICE VISIT (OUTPATIENT)
Dept: CARDIOLOGY | Facility: CLINIC | Age: 80
End: 2025-04-14
Payer: MEDICARE

## 2025-04-14 ENCOUNTER — APPOINTMENT (OUTPATIENT)
Dept: LAB | Age: 80
End: 2025-04-14
Attending: INTERNAL MEDICINE
Payer: MEDICARE

## 2025-04-14 VITALS
OXYGEN SATURATION: 91 % | WEIGHT: 144 LBS | DIASTOLIC BLOOD PRESSURE: 66 MMHG | HEART RATE: 70 BPM | HEIGHT: 58 IN | BODY MASS INDEX: 30.23 KG/M2 | SYSTOLIC BLOOD PRESSURE: 138 MMHG

## 2025-04-14 DIAGNOSIS — I48.91 ATRIAL FIBRILLATION, UNSPECIFIED TYPE (CMS/HCC): ICD-10-CM

## 2025-04-14 DIAGNOSIS — J44.1 OBSTRUCTIVE CHRONIC BRONCHITIS WITH EXACERBATION (CMS/HCC): ICD-10-CM

## 2025-04-14 DIAGNOSIS — R06.00 DYSPNEA, UNSPECIFIED TYPE: ICD-10-CM

## 2025-04-14 DIAGNOSIS — Z79.01 LONG TERM (CURRENT) USE OF ANTICOAGULANTS: ICD-10-CM

## 2025-04-14 DIAGNOSIS — I48.91 ATRIAL FIBRILLATION, UNSPECIFIED TYPE (CMS/HCC): Primary | ICD-10-CM

## 2025-04-14 DIAGNOSIS — I10 ESSENTIAL HYPERTENSION, BENIGN: ICD-10-CM

## 2025-04-14 DIAGNOSIS — I35.0 NONRHEUMATIC AORTIC VALVE STENOSIS: ICD-10-CM

## 2025-04-14 DIAGNOSIS — I48.21 PERMANENT ATRIAL FIBRILLATION (CMS/HCC): ICD-10-CM

## 2025-04-14 DIAGNOSIS — R06.09 EXERTIONAL DYSPNEA: ICD-10-CM

## 2025-04-14 LAB
ALBUMIN SERPL-MCNC: 4.1 G/DL (ref 3.5–5.7)
ALP SERPL-CCNC: 92 IU/L (ref 34–125)
ALT SERPL-CCNC: 8 IU/L (ref 7–52)
ANION GAP SERPL CALC-SCNC: 8 MEQ/L (ref 3–15)
AST SERPL-CCNC: 21 IU/L (ref 13–39)
BASOPHILS # BLD: 0.04 K/UL (ref 0.01–0.1)
BASOPHILS NFR BLD: 0.4 %
BILIRUB SERPL-MCNC: 1 MG/DL (ref 0.3–1.2)
BNP SERPL-MCNC: 567 PG/ML
BUN SERPL-MCNC: 26 MG/DL (ref 7–25)
CALCIUM SERPL-MCNC: 9.4 MG/DL (ref 8.6–10.3)
CHLORIDE SERPL-SCNC: 104 MEQ/L (ref 98–107)
CO2 SERPL-SCNC: 27 MEQ/L (ref 21–31)
CREAT SERPL-MCNC: 0.7 MG/DL (ref 0.6–1.2)
DIFFERENTIAL METHOD BLD: ABNORMAL
EGFRCR SERPLBLD CKD-EPI 2021: >60 ML/MIN/1.73M*2
EOSINOPHIL # BLD: 0.17 K/UL (ref 0.04–0.36)
EOSINOPHIL NFR BLD: 1.8 %
ERYTHROCYTE [DISTWIDTH] IN BLOOD BY AUTOMATED COUNT: 15.5 % (ref 11.7–14.4)
GLUCOSE SERPL-MCNC: 112 MG/DL (ref 70–99)
HCT VFR BLD AUTO: 34.3 % (ref 35–45)
HGB BLD-MCNC: 10.9 G/DL (ref 11.8–15.7)
IMM GRANULOCYTES # BLD AUTO: 0.04 K/UL (ref 0–0.08)
IMM GRANULOCYTES NFR BLD AUTO: 0.4 %
LYMPHOCYTES # BLD: 1.49 K/UL (ref 1.2–3.5)
LYMPHOCYTES NFR BLD: 15.8 %
MCH RBC QN AUTO: 29.5 PG (ref 28–33.2)
MCHC RBC AUTO-ENTMCNC: 31.8 G/DL (ref 32.2–35.5)
MCV RBC AUTO: 93 FL (ref 83–98)
MONOCYTES # BLD: 0.68 K/UL (ref 0.28–0.8)
MONOCYTES NFR BLD: 7.2 %
NEUTROPHILS # BLD: 7 K/UL (ref 1.7–7)
NEUTS SEG NFR BLD: 74.4 %
NRBC BLD-RTO: 0 %
PLATELET # BLD AUTO: 344 K/UL (ref 150–369)
PMV BLD AUTO: 10.4 FL (ref 9.4–12.3)
POTASSIUM SERPL-SCNC: 4.7 MEQ/L (ref 3.5–5.1)
PROT SERPL-MCNC: 6.6 G/DL (ref 6–8.2)
QRS DURATION: 80
QT INTERVAL: 394
QTC CALCULATION(BAZETT): 413
R AXIS: 18
RBC # BLD AUTO: 3.69 M/UL (ref 3.93–5.22)
SODIUM SERPL-SCNC: 139 MEQ/L (ref 136–145)
T WAVE AXIS: 18
VENTRICULAR RATE: 66
WBC # BLD AUTO: 9.42 K/UL (ref 3.8–10.5)

## 2025-04-14 PROCEDURE — 36415 COLL VENOUS BLD VENIPUNCTURE: CPT

## 2025-04-14 PROCEDURE — 93000 ELECTROCARDIOGRAM COMPLETE: CPT | Performed by: INTERNAL MEDICINE

## 2025-04-14 PROCEDURE — 80053 COMPREHEN METABOLIC PANEL: CPT

## 2025-04-14 PROCEDURE — G8752 SYS BP LESS 140: HCPCS | Performed by: INTERNAL MEDICINE

## 2025-04-14 PROCEDURE — G8754 DIAS BP LESS 90: HCPCS | Performed by: INTERNAL MEDICINE

## 2025-04-14 PROCEDURE — 99214 OFFICE O/P EST MOD 30 MIN: CPT | Performed by: INTERNAL MEDICINE

## 2025-04-14 PROCEDURE — 85025 COMPLETE CBC W/AUTO DIFF WBC: CPT

## 2025-04-14 PROCEDURE — 83880 ASSAY OF NATRIURETIC PEPTIDE: CPT

## 2025-04-14 RX ORDER — DIGOXIN 125 UG/1
125 TABLET ORAL 3 TIMES WEEKLY
COMMUNITY
Start: 2025-04-14

## 2025-04-14 RX ORDER — FLUTICASONE PROPIONATE 50 MCG
1 SPRAY, SUSPENSION (ML) NASAL DAILY
COMMUNITY

## 2025-04-14 RX ORDER — FUROSEMIDE 20 MG/1
20 TABLET ORAL DAILY
COMMUNITY

## 2025-04-14 RX ORDER — LIDOCAINE 560 MG/1
1 PATCH PERCUTANEOUS; TOPICAL; TRANSDERMAL DAILY
COMMUNITY

## 2025-04-14 RX ORDER — SODIUM CHLORIDE 1 G/1
1 TABLET ORAL DAILY
COMMUNITY

## 2025-04-14 ASSESSMENT — ENCOUNTER SYMPTOMS
SYNCOPE: 0
BRUISES/BLEEDS EASILY: 0
DYSPNEA ON EXERTION: 1
NEAR-SYNCOPE: 0
LIGHT-HEADEDNESS: 0
DIZZINESS: 0
PALPITATIONS: 0

## 2025-04-14 NOTE — ASSESSMENT & PLAN NOTE
The patient has chronic atrial fibrillation and probably has some degree of diastolic dysfunction as a result.  She is taking low-dose Lasix and may take extra doses now and again.  She has had some trouble with her serum sodium and so we will recheck those numbers at this time.  She remains on chronic warfarin therapy and she requires usual blood work for surveillance.  The patient has been on digoxin for a number of years.  I asked her to cut her dose to 3 times per week.  She has been monitoring her heart rate and has been holding the medication for heart rates less than 60 and is probably taking it only 3-4 times per week anyway.

## 2025-04-14 NOTE — ASSESSMENT & PLAN NOTE
The patient has a history of bronchitis in the past and reports a history of asthma.  She follows with Dr. Mcdaniel of the pulmonary service.

## 2025-04-14 NOTE — ASSESSMENT & PLAN NOTE
The patient has chronic exertional dyspnea which she feels may have worsened over the last few months.  She did have an episode of respiratory syncytial virus requiring hospitalization at the beginning of the year and this may play a role.  In addition, she has chronic A-fib and states that taking Lasix helps her breathing.  This suggests some degree of diastolic heart failure which is certainly a possibility.  She has had trouble with low serum sodiums in the past and she is on Lasix and so I asked her to have some blood work performed which will include serum chemistries as well as a BNP.  She will follow-up in the office again in 3 months.

## 2025-04-14 NOTE — ASSESSMENT & PLAN NOTE
The patient has mild to perhaps moderate aortic valvular stenosis and physical exam reveals a murmur consistent with that diagnosis.  We will follow her clinically and consider periodic repeat echocardiographic examinations.

## 2025-04-16 ENCOUNTER — TELEPHONE (OUTPATIENT)
Dept: CARDIOLOGY | Facility: CLINIC | Age: 80
End: 2025-04-16
Payer: MEDICARE

## 2025-04-16 NOTE — TELEPHONE ENCOUNTER
I called the patient to discuss the results of her recent blood work with her.  Her BNP was definitely elevated and so I have encouraged her to make sure that she takes her Lasix daily with extra doses as needed to improve her breathing.  I asked her to discontinue salt tablets as this may be contributing to fluid retention.  Her serum sodium was normal at 139 and we will monitor that with periodic blood testing.   RHEUMATOLOGY CONSULT VISIT   Date: 2020    Name: Sherrilee Castleman  : 1988    Referred by: Dr Kenna Goldstein    History of Present Illness:  Sherrilee Castleman is a 27year old female who presents today for an evaluation of: pain: neck, back, joints including R foot, ankles. It feels like tingling or a cramp  Saw neuro, ENT. Has abnormal periods and diarrhea/constipation. She has eczema, not psoriasis. Legs and hands can hurt and she states that the hands are swollen, better in AM. Eczema is in the hairline. She was never seen by a dermatologist.  She is pre DM. She is tired in AM, not sleeping well. Not stiff in AM but the feet are hurting standing in AM.Takes Elavil for the pain. Took prednisone for fluid in the ears and it helped. States that the joints were not better with the prednisone. Past Medical History:  Eczema  Pre DM    Past Surgical History:  cholecystectomy     Social History:  Social History     Socioeconomic History   â¢ Marital status: Single     Spouse name: Not on file   â¢ Number of children: 3   â¢ Years of education: Not on file   â¢ Highest education level: Not on file   Occupational History   â¢ Occupation: CNA   Social Needs   â¢ Financial resource strain: Not very hard   â¢ Food insecurity     Worry: Never true     Inability: Never true   â¢ Transportation needs     Medical: No     Non-medical: No   Tobacco Use   â¢ Smoking status: Never Smoker   â¢ Smokeless tobacco: Never Used   Substance and Sexual Activity   â¢ Alcohol use: Not Currently   â¢ Drug use: Never   â¢ Sexual activity: Yes     Birth control/protection: Surgical, Condom   Lifestyle   â¢ Physical activity     Days per week: 0 days     Minutes per session: 0 min   â¢ Stress: To some extent   Relationships   â¢ Social connections     Talks on phone: More than three times a week     Gets together:  Three times a week     Attends [employer] service: Never     Active member of club or organization: No Attends meetings of clubs or organizations: Never     Relationship status:    â¢ Intimate partner violence     Fear of current or ex partner: Not on file     Emotionally abused: Not on file     Physically abused: Not on file     Forced sexual activity: Not on file   Other Topics Concern   â¢  Service Not Asked   â¢ Blood Transfusions Not Asked   â¢ Caffeine Concern Not Asked   â¢ Occupational Exposure Not Asked   â¢ Hobby Hazards Not Asked   â¢ Sleep Concern Not Asked   â¢ Stress Concern Not Asked   â¢ Weight Concern Not Asked   â¢ Special Diet Not Asked   â¢ Back Care Not Asked   â¢ Exercise No   â¢ Bike Helmet Not Asked   â¢ Seat Belt Yes   â¢ Self-Exams Not Asked   Social History Narrative   â¢ Not on file   . Family History:  Family History   Problem Relation Age of Onset   â¢ Allergic Rhinitis Mother    â¢ Diabetes Father    â¢ Alcohol Abuse Father    no psoriasis or colitis or arthritis       Allergies:  ALLERGIES:  Latex    Current Medications:  Current Outpatient Medications   Medication Sig Dispense Refill   â¢ amitriptyline (ELAVIL) 10 MG tablet Take 1 tablet by mouth nightly. 30 tablet 1   â¢ fluticasone (FLONASE) 50 MCG/ACT nasal spray Spray 1 spray in each nostril daily. 16 g 12   â¢ clobetasol (TEMOVATE) 0.05 % cream Apply topically 2 times daily. 30 g 0   â¢ fexofenadine-pseudoephedrine (ALLEGRA-D)  MG per 12 hr tablet Take 1 tablet by mouth 2 times daily. 20 tablet 0   â¢ predniSONE (DELTASONE) 20 MG tablet Take 1 tablet by mouth 2 times daily. 10 tablet 0   â¢ predniSONE (DELTASONE) 10 MG tablet Take 1 tablet by mouth 2 times daily. 10 tablet 0     No current facility-administered medications for this visit. REVIEW OF SYSTEMS :  Constituational:  Patient denies fever, chills, sweats or weight loss. Feels hot all the time. Neurological:  Patient denies headaches, jaw pain, weakness, slurred speech, paralysis or difficulty with balance. Has brain fog.   Skin:  Patient denies any rashes, changes in skin or suspicious lesions, and white or blue digits with cold exposure, hair loss  Eyes:  Patient denies blurred vision, change in vision, dryness . Ears, Nose and Mouth:  Patient denies ear pain, change in hearing, dysphagia, can have odynophagia, dysphonia, no new lumps in neck,has mouth sores and dry mouth. Respiratory:  Patient denies shortness of breath, cough. Cardiovascular:  Patient denies history of arrhythmia, palpitations, chest pain, paroxysmal nocturnal dyspnea   Gastrointestinal:  Patient denies nausea, vomiting, change in bowel habits. Genitourinary:  Patient denies dysuria, frequency, hematuria or flank pain. Musculoskeletal:  Patient denies joint pain, joint swelling or pain in legs when walking or climbing, raising arms, back pain. Hematologic/lymphatic:  Patient denies nose bleeds, easy/excessive bruising, bleeding gums or blood clots in legs/other areas of the body.   Endocrinology: no thyroid disease        LAB  HGB (g/dL)   Date Value   11/05/2019 12.4     HCT (%)   Date Value   11/05/2019 39.2     WBC (K/mcL)   Date Value   11/05/2019 7.2     PLT (K/mcL)   Date Value   11/05/2019 404      Glucose (mg/dL)   Date Value   11/05/2019 100 (H)     BUN (mg/dL)   Date Value   11/05/2019 15     Creatinine (mg/dL)   Date Value   11/05/2019 0.63     CALCIUM (mg/dL)   Date Value   11/05/2019 9.1     TOTAL BILIRUBIN (mg/dL)   Date Value   11/05/2019 0.4     ALT/SGPT (Units/L)   Date Value   11/05/2019 53     AST/SGOT (Units/L)   Date Value   11/05/2019 14     ALK PHOSPHATASE (Units/L)   Date Value   11/05/2019 97     Albumin (g/dL)   Date Value   11/05/2019 4.0     Sodium (mmol/L)   Date Value   11/05/2019 138     Potassium (mmol/L)   Date Value   11/05/2019 4.4     Chloride (mmol/L)   Date Value   11/05/2019 105     Carbon Dioxide (mmol/L)   Date Value   11/05/2019 26     ESR 27  AIMEE neg  CMP WNL           OBJECTIVE      PHYSICAL EXAM :  Vital Signs:    Visit Vitals  /80   Pulse "97   Temp 97.5 Â°F (36.4 Â°C) (Temporal)   Resp 20   Ht 5' 7"" (1.702 m)   Wt 108.9 kg (240 lb)   LMP 11/21/2020   SpO2 98%   BMI 37.59 kg/mÂ²     General:  Alert, awake, oriented x 3, not in acute distress. Pleasant and appears stated age, obese. Eyes: not iritis, anicteric sclerae, ESTRELLITA  Skin: no rashes, no nodules, no tophi, no stasis dermatitis, no sclerodactyly  HEENT: No malar rash, parotid gland enlargement, no thyromegaly, no salivary submandibular glands enlargement, has a mask for Covid-19 protection    Neck:  Supple, no thyromegaly or lymphadenopathy with no palpable mass. Lungs:  No rales or wheezing. Clear to auscultation. Good air entry. No sternal tenderness. Heart:  No gallop. S1 and S2 is heard without murmur, RR. Abdomen:  Soft, positive bowel sounds, no tenderness, no organomegaly. Extremities:  No bipedal edema, cyanosis or clubbing. No periungual erythema, nail pitting, fat pad atrophy or digital ulcerations. No Gottron's papule or telangiectasia. No bilateral puffy fingers. No periungual nailfold changes. No axillary or inguinal lymphadenopathy. Neurological:  grossly WNL  Musculoskeletal:    Has GROM in all the joints including the C spine, no synovitis. Some positive trigger points are present. ASSESSMENT & PLAN:  Patient with fibromyalgia, eczema, might have psoriasis with psoriatic arthritis? Will check HLA B 27, CRP. ESR is higher but it might be from high BMI. Can try flexeril 10 mg q HS.       Rob James MD  12/28/2020  "

## 2025-04-16 NOTE — TELEPHONE ENCOUNTER
Pt called and was looking for Dr. Vazquez to go over her lab results as well as answer questions regarding her INR.

## 2025-05-06 ENCOUNTER — APPOINTMENT (OUTPATIENT)
Dept: LAB | Age: 80
End: 2025-05-06
Attending: INTERNAL MEDICINE
Payer: MEDICARE

## 2025-05-06 DIAGNOSIS — I48.0 PAF (PAROXYSMAL ATRIAL FIBRILLATION) (CMS/HCC): ICD-10-CM

## 2025-05-06 LAB
INR PPP: 1.5
PROTHROMBIN TIME: 17.6 SEC (ref 12.2–14.5)

## 2025-05-06 PROCEDURE — 36415 COLL VENOUS BLD VENIPUNCTURE: CPT

## 2025-05-06 PROCEDURE — 85610 PROTHROMBIN TIME: CPT

## 2025-05-07 ENCOUNTER — ANTICOAGULATION VISIT (OUTPATIENT)
Dept: CARDIOLOGY | Facility: CLINIC | Age: 80
End: 2025-05-07
Payer: MEDICARE

## 2025-05-07 DIAGNOSIS — Z79.01 LONG TERM (CURRENT) USE OF ANTICOAGULANTS: ICD-10-CM

## 2025-05-07 DIAGNOSIS — I48.91 ATRIAL FIBRILLATION, UNSPECIFIED TYPE (CMS/HCC): Primary | ICD-10-CM

## 2025-05-07 PROCEDURE — 93793 ANTICOAG MGMT PT WARFARIN: CPT | Performed by: INTERNAL MEDICINE

## 2025-05-13 ENCOUNTER — APPOINTMENT (OUTPATIENT)
Dept: LAB | Age: 80
End: 2025-05-13
Attending: INTERNAL MEDICINE
Payer: MEDICARE

## 2025-05-13 ENCOUNTER — TRANSCRIBE ORDERS (OUTPATIENT)
Dept: LAB | Age: 80
End: 2025-05-13

## 2025-05-13 DIAGNOSIS — I50.33 ACUTE ON CHRONIC DIASTOLIC HEART FAILURE (CMS/HCC): Primary | ICD-10-CM

## 2025-05-13 DIAGNOSIS — I50.33 ACUTE ON CHRONIC DIASTOLIC HEART FAILURE (CMS/HCC): ICD-10-CM

## 2025-05-13 LAB
ALBUMIN SERPL-MCNC: 4 G/DL (ref 3.5–5.7)
ALP SERPL-CCNC: 82 IU/L (ref 34–125)
ALT SERPL-CCNC: 7 IU/L (ref 7–52)
ANION GAP SERPL CALC-SCNC: 8 MEQ/L (ref 3–15)
AST SERPL-CCNC: 18 IU/L (ref 13–39)
BILIRUB SERPL-MCNC: 0.6 MG/DL (ref 0.3–1.2)
BUN SERPL-MCNC: 25 MG/DL (ref 7–25)
CALCIUM SERPL-MCNC: 9.3 MG/DL (ref 8.6–10.3)
CHLORIDE SERPL-SCNC: 107 MEQ/L (ref 98–107)
CO2 SERPL-SCNC: 27 MEQ/L (ref 21–31)
CREAT SERPL-MCNC: 0.7 MG/DL (ref 0.6–1.2)
EGFRCR SERPLBLD CKD-EPI 2021: >60 ML/MIN/1.73M*2
GLUCOSE SERPL-MCNC: 166 MG/DL (ref 70–99)
MAGNESIUM SERPL-MCNC: 1.7 MG/DL (ref 1.8–2.5)
POTASSIUM SERPL-SCNC: 4.9 MEQ/L (ref 3.5–5.1)
PROT SERPL-MCNC: 6.9 G/DL (ref 6–8.2)
SODIUM SERPL-SCNC: 142 MEQ/L (ref 136–145)

## 2025-05-13 PROCEDURE — 36415 COLL VENOUS BLD VENIPUNCTURE: CPT

## 2025-05-13 PROCEDURE — 83735 ASSAY OF MAGNESIUM: CPT

## 2025-05-13 PROCEDURE — 80053 COMPREHEN METABOLIC PANEL: CPT

## 2025-05-21 ENCOUNTER — APPOINTMENT (OUTPATIENT)
Dept: LAB | Age: 80
End: 2025-05-21
Attending: INTERNAL MEDICINE
Payer: MEDICARE

## 2025-05-21 DIAGNOSIS — I48.91 ATRIAL FIBRILLATION, UNSPECIFIED TYPE (CMS/HCC): ICD-10-CM

## 2025-05-21 LAB
INR PPP: 2.2
PROTHROMBIN TIME: 24.3 SEC (ref 12.2–14.5)

## 2025-05-21 PROCEDURE — 36415 COLL VENOUS BLD VENIPUNCTURE: CPT

## 2025-05-21 PROCEDURE — 85610 PROTHROMBIN TIME: CPT

## 2025-05-22 ENCOUNTER — ANTICOAGULATION VISIT (OUTPATIENT)
Dept: CARDIOLOGY | Facility: CLINIC | Age: 80
End: 2025-05-22
Payer: MEDICARE

## 2025-05-22 DIAGNOSIS — Z79.01 LONG TERM (CURRENT) USE OF ANTICOAGULANTS: Primary | ICD-10-CM

## 2025-05-22 DIAGNOSIS — I48.0 PAROXYSMAL ATRIAL FIBRILLATION (CMS/HCC): ICD-10-CM

## 2025-05-22 PROCEDURE — 93793 ANTICOAG MGMT PT WARFARIN: CPT | Performed by: INTERNAL MEDICINE

## 2025-07-15 ENCOUNTER — TELEPHONE (OUTPATIENT)
Dept: CARDIOLOGY | Facility: CLINIC | Age: 80
End: 2025-07-15
Payer: MEDICARE

## 2025-07-15 ENCOUNTER — APPOINTMENT (OUTPATIENT)
Dept: LAB | Age: 80
End: 2025-07-15
Attending: INTERNAL MEDICINE
Payer: MEDICARE

## 2025-07-15 DIAGNOSIS — I48.91 ATRIAL FIBRILLATION, UNSPECIFIED TYPE (CMS/HCC): ICD-10-CM

## 2025-07-15 LAB
INR PPP: 1.9
PROTHROMBIN TIME: 21.3 SEC (ref 12.2–14.5)

## 2025-07-15 PROCEDURE — 85610 PROTHROMBIN TIME: CPT

## 2025-07-15 PROCEDURE — 36415 COLL VENOUS BLD VENIPUNCTURE: CPT

## 2025-07-15 NOTE — TELEPHONE ENCOUNTER
7/15/25 11a LMOM  INR from 5/22/25 2.2  to be rechecked in 1 month, this is just a reminder.  Thank you.

## 2025-07-16 ENCOUNTER — ANTICOAGULATION VISIT (OUTPATIENT)
Dept: CARDIOLOGY | Facility: CLINIC | Age: 80
End: 2025-07-16
Payer: MEDICARE

## 2025-07-16 DIAGNOSIS — Z79.01 LONG TERM (CURRENT) USE OF ANTICOAGULANTS: ICD-10-CM

## 2025-07-16 DIAGNOSIS — I48.91 ATRIAL FIBRILLATION, UNSPECIFIED TYPE (CMS/HCC): Primary | ICD-10-CM

## 2025-07-16 LAB — INR PPP: 1.9

## 2025-07-16 PROCEDURE — 93793 ANTICOAG MGMT PT WARFARIN: CPT | Performed by: INTERNAL MEDICINE

## 2025-07-17 NOTE — PROGRESS NOTES
INR 1.9 is below goal range of 2-3.   Patient missed last week Friday dose   No change in coumadin dose (given missed dose last week).  Repeat INR 1 week without missed doses.

## 2025-07-17 NOTE — PROGRESS NOTES
Spoke with Mrs. Monzon, she stated she missed her dose of Warfarin on Friday, which would have been 5mg. NO other changes in diet or meds, BB

## 2025-07-17 NOTE — PROGRESS NOTES
LVM   for Mrs. Monzon, per Dr. Rivera, No change bc of the missed dose on Friday, & repeat INR in one week, BB

## 2025-07-23 NOTE — PROGRESS NOTES
Cardiology  Office Progress Note         Reason for visit:   Chief Complaint   Patient presents with    Follow-up       HPI     Ev Monzon is a 80 y.o. female who presents to the office for cardiovascular follow up and management of atrial fibrillation (on Coumadin), hypertension and AS. Other history includes COPD/asthma and type 2 diabetes.     She was last seen in the office on 4/14/25. Patient reported that her chronic exertional dyspnea had worsened over the last several months. She reported improvement in breathing with taking Lasix. She was having some trouble with her serum sodium so I asked her to complete some lab work. I made no changes to her medical regimen and advised to follow up in 3 months. I planned to repeat echocardiogram periodically.     4/14/25: , Cr 0.7, Na 139, K 4.7.  -I encouraged her to take Lasix daily and then PRN extra dose to help with her breathing. She was to discontinue salt tablets as sodium is normal.     Today she reports tolerating her diuretic on a daily basis. She notes that her exertional shortness is slightly improve but definitely still there. She reports that needs to stop at least once up a flight of stairs to catch her breath. Her lower extremity edema is also mildly improved.     She denies chest pain, palpitations, dizziness or syncope. On occasion she has some positional lightheadedness.     She would like to recheck BNP and electrolytes.     Past Medical History:   Diagnosis Date    Asthma     Atrial fibrillation (CMS/HCC)     COPD (chronic obstructive pulmonary disease) (CMS/HCC)     Diverticulitis     ruptured    Type 2 diabetes mellitus (CMS/HCC)        Past Surgical History   Procedure Laterality Date    Cataract extraction, bilateral      Hernia repair      Laparoscopic colon resection      Left heart cath with coronary angiography N/A 1/8/2025    Performed by Carine Cho MD at  [facility]       Social History     Tobacco Use     Smoking status: Never    Smokeless tobacco: Never   Substance Use Topics    Alcohol use: Yes     Comment: socially    Drug use: No       No family history on file.    Allergies:  Reglan [metoclopramide hcl], Amoxicillin, Bactrim [sulfamethoxazole-trimethoprim], Formaldehyde, and Latex    Current Outpatient Medications   Medication Sig Dispense Refill    amLODIPine (NORVASC) 2.5 mg tablet Take 1 tablet (2.5 mg total) by mouth daily.      atorvastatin (LIPITOR) 10 mg tablet Take 10 mg by mouth once daily.  5    [START ON 7/25/2025] DIGOX 125 mcg (0.125 mg) tablet Take 1 tablet (125 mcg total) by mouth 3 (three) times a week (Mon, Wed, Fri). 36 tablet 3    fexofenadine HCl (MUCINEX ALLERGY ORAL) Take by mouth.      fluticasone propionate (FLONASE) 50 mcg/actuation nasal spray Administer 1 spray into each nostril daily.      fluticasone-umeclidinium-vilanterol (TRELEGY ELLIPTA) 100-62.5-25 mcg blister with device powder for inhalation Inhale 1 puff daily.      furosemide (LASIX) 20 mg tablet Take 20 mg by mouth daily. (Patient taking differently: Take 20 mg by mouth daily.)      JANUVIA 100 mg tablet Take 100 mg by mouth daily.        lidocaine (ASPERCREME) 4 % adhesive patch,medicated topical patch Apply 1 patch topically daily. Remove & discard patch within 12 hours or as directed by prescriber.      metFORMIN (GLUCOPHAGE) 500 mg tablet Take 1,000 mg by mouth 2 (two) times a day with meals.        metoprolol succinate XL (TOPROL-XL) 50 mg 24 hr tablet Take 1 tablet (50 mg total) by mouth daily. 90 tablet 3    montelukast (SINGULAIR) 10 mg tablet Take 10 mg by mouth every evening.    3    warfarin (COUMADIN) 2.5 mg tablet Take 1.5 tablets (3.75 mg total) by mouth 5 (five) times a week (Sun, Tue, Wed, Thu, Sat). 30 tablet 11    warfarin (COUMADIN) 2.5 mg tablet Take 1 tablet (2.5 mg total) by mouth 2 (two) times a week (Mon, Thu). 8 tablet 11     No current facility-administered medications for this visit.       Review  of Systems   Cardiovascular:  Positive for dyspnea on exertion and leg swelling. Negative for chest pain, near-syncope, palpitations and syncope.   Hematologic/Lymphatic: Bruises/bleeds easily.   Neurological:  Positive for light-headedness. Negative for dizziness.       Objective     Vitals:    07/24/25 1202   BP: (!) 150/70   Pulse: 64   SpO2: 92%       Wt Readings from Last 5 Encounters:   07/24/25 65.3 kg (144 lb)   04/14/25 65.3 kg (144 lb)   02/06/25 65.3 kg (144 lb)   01/14/25 67.5 kg (148 lb 13 oz)   12/28/18 77.1 kg (170 lb)       Body mass index is 30.1 kg/m².    Physical Exam  Vitals reviewed.   Constitutional:       General: She is not in acute distress.     Appearance: Normal appearance.   HENT:      Head: Normocephalic and atraumatic.   Eyes:      General: No scleral icterus.  Neck:      Vascular: No carotid bruit, hepatojugular reflux or JVD.   Cardiovascular:      Rate and Rhythm: Normal rate. Rhythm irregular.      Heart sounds: S1 normal and S2 normal. Murmur heard.      No S3 or S4 sounds.   Pulmonary:      Effort: Pulmonary effort is normal. No respiratory distress.      Breath sounds: No wheezing or rales.   Musculoskeletal:      Right lower leg: Edema (+1 pitting LE) present.      Left lower leg: Edema (+1 pitting LE) present.      Comments: Ambulates with walker     Skin:     General: Skin is warm and dry.   Neurological:      General: No focal deficit present.      Mental Status: She is alert and oriented to person, place, and time. Mental status is at baseline.   Psychiatric:         Mood and Affect: Mood normal.         Thought Content: Thought content normal.         Judgment: Judgment normal.          ECG: Atrial fibrillation, mild nonspecific ST abnormality    Hematology  Lab Results   Component Value Date    WBC 9.42 04/14/2025    HGB 10.9 (L) 04/14/2025    HCT 34.3 (L) 04/14/2025     04/14/2025    INR 1.9 07/15/2025       Chemistries  Lab Results   Component Value Date      05/13/2025    K 4.9 05/13/2025     05/13/2025    CREATININE 0.7 05/13/2025    BUN 25 05/13/2025    CO2 27 05/13/2025    GLUCOSE 166 (H) 05/13/2025    CALCIUM 9.3 05/13/2025    MG 1.7 (L) 05/13/2025    ALT 7 05/13/2025    AST 18 05/13/2025       Cholesterol  Lab Results   Component Value Date    CHOL 111 03/21/2025    TRIG 89 03/21/2025    HDL 39 (L) 03/21/2025    LDLCALC 54 03/21/2025    NONHDLCALC 72 03/21/2025       Endocrine  Lab Results   Component Value Date    TSH 1.46 03/21/2025    FREET4 0.89 03/21/2025    HGBA1C 6.0 (H) 03/21/2025       Cardiac Imaging    TRANSTHORACIC ECHO (TTE) COMPLETE 01/06/2025    Interpretation Summary    Left Ventricle: Normal ventricle size. Mild concentric left ventricular hypertrophy. Low normal systolic function. Estimated EF 55%.  A small portion of the the anterolateral wall appears to be markedly hypokinetic.  The interventricular septum appears to move normally.  The remaining LV wall segments appear to contract normally. Diastolic function: patient in a-fib.    Aortic Valve: Tricuspid valve.  Sclerotic leaflets. Mild regurgitation. Mild to moderate stenosis. Peak velocity = 2.57 m/s. Mean gradient = 14.00 mmHg. Calculated area by cont eq = 1.44 cm2. Calculated dimensionless index = 0.46.    Tricuspid Valve: Structure is grossly normal. Mild regurgitation. Estimated RVSP = 63 mmHg.    Pulmonic Valve: Grossly normal structure. Mild regurgitation.    Right Atrium: Mildly perhaps moderately dilated atrium.    Left Atrium: Moderately dilated atrium.    Pericardium: No evidence of pericardial effusion.    Mitral Valve: Sclerotic mitral valve. Normal leaflet motion. Trace regurgitation.    Right Ventricle: Normal ventricle size. Normal systolic function.    Aorta: Aortic root sclerotic. Sinuses of Valsalva sclerotic. Ascending aorta sclerotic.    IVC/SVC: Inferior vena cava is <2.1cm. Inferior vena cava collapses <50% during inspiration.      Most recent cardiac cath  results:    LEFT HEART CATH WITH CORONARY ANGIOGRAPHY 01/08/2025 (Final) 1/8/2025    Conclusion  Angiographically patent epicardial coronary arteries    RECOMMENDATIONS:  Medical management with risk factor modification            Assessment/Plan     Exertional dyspnea  The patient's exertional dyspnea is likely due to some mild diastolic dysfunction along with residual lung dysfunction from her RSV.  She states that she feels better with daily Lasix and so we will continue that medication.  I am rechecking her labs to make sure that her electrolytes are stable.  We will also recheck a BNP which was elevated at last check.    Atrial fibrillation (CMS/HCC)  The patient remains in rate controlled atrial fibrillation.  She is doing well on warfarin therapy.  We did discuss possibly switching to a newer agent, Werbel, she has done quite well on this medication and she is comfortable with the dosing etc.  For now, I made no changes, and we can revisit this issue from time to time.    Nonrheumatic aortic valve stenosis  The patient has mild aortic valvular stenosis.  We will periodically repeat her echocardiogram for surveillance.      New Medications Ordered This Visit   Medications    DIGOX 125 mcg (0.125 mg) tablet     Sig: Take 1 tablet (125 mcg total) by mouth 3 (three) times a week (Mon, Wed, Fri).     Dispense:  36 tablet     Refill:  3    metoprolol succinate XL (TOPROL-XL) 50 mg 24 hr tablet     Sig: Take 1 tablet (50 mg total) by mouth daily.     Dispense:  90 tablet     Refill:  3    warfarin (COUMADIN) 2.5 mg tablet     Sig: Take 1.5 tablets (3.75 mg total) by mouth 5 (five) times a week (Sun, Tue, Wed, Thu, Sat).     Dispense:  30 tablet     Refill:  11    warfarin (COUMADIN) 2.5 mg tablet     Sig: Take 1 tablet (2.5 mg total) by mouth 2 (two) times a week (Mon, Thu).     Dispense:  8 tablet     Refill:  11       Medications Discontinued During This Encounter   Medication Reason    sodium chloride 1 g tablet  Patient Discontinued Medication    metoprolol succinate XL (TOPROL-XL) 50 mg 24 hr tablet Reorder    warfarin (COUMADIN) 2.5 mg tablet Reorder    warfarin (COUMADIN) 2.5 mg tablet Reorder    DIGOX 125 mcg (0.125 mg) tablet Reorder       Orders Placed This Encounter   Procedures    Comprehensive metabolic panel     Standing Status:   Future     Expiration Date:   7/24/2026     Release to patient:   Immediate [1]    B-type natriuretic peptide     Standing Status:   Future     Expiration Date:   7/24/2026     Release to patient:   Immediate [1]    CBC and differential     Standing Status:   Future     Expiration Date:   7/24/2026     Release to patient:   Immediate [1]    [facility] MUSE ECG 12 lead (clinic performed)     Scheduling Instructions:      PLEASE USE THIS ORDER FOR ECG'S PERFORMED IN PHYSICIAN OFFICES     Release to patient:   Immediate [1]       Follow Up Plans:  Return in about 6 months (around 1/24/2026) for Recheck.          I attest that this visit supports the complexity inherent to evaluation and management associated with medical care services that serve as the continuing focal point for all needed health care services and/or medical care services that are part of ongoing care related to this patient's single, serious condition or a complex condition.        I, Barbara Moore, am scribing for, and in the presence of, Mustapha Vazquez MD.    I, Mustapha Vazquez MD, personally performed the services described in this documentation as scribed by Barbara Moore in my presence, and it is both accurate and complete.       Mustapha aVzquez MD  7/24/2025

## 2025-07-24 ENCOUNTER — OFFICE VISIT (OUTPATIENT)
Dept: CARDIOLOGY | Facility: CLINIC | Age: 80
End: 2025-07-24
Payer: MEDICARE

## 2025-07-24 ENCOUNTER — TELEPHONE (OUTPATIENT)
Dept: CARDIOLOGY | Facility: CLINIC | Age: 80
End: 2025-07-24

## 2025-07-24 VITALS
DIASTOLIC BLOOD PRESSURE: 70 MMHG | HEART RATE: 64 BPM | HEIGHT: 58 IN | BODY MASS INDEX: 30.23 KG/M2 | OXYGEN SATURATION: 92 % | SYSTOLIC BLOOD PRESSURE: 150 MMHG | WEIGHT: 144 LBS

## 2025-07-24 DIAGNOSIS — I48.21 PERMANENT ATRIAL FIBRILLATION (CMS/HCC): ICD-10-CM

## 2025-07-24 DIAGNOSIS — R06.09 EXERTIONAL DYSPNEA: ICD-10-CM

## 2025-07-24 DIAGNOSIS — I10 ESSENTIAL HYPERTENSION, BENIGN: ICD-10-CM

## 2025-07-24 DIAGNOSIS — I48.0 PAROXYSMAL ATRIAL FIBRILLATION (CMS/HCC): ICD-10-CM

## 2025-07-24 DIAGNOSIS — I35.0 NONRHEUMATIC AORTIC VALVE STENOSIS: ICD-10-CM

## 2025-07-24 DIAGNOSIS — I50.32 CHRONIC DIASTOLIC CONGESTIVE HEART FAILURE (CMS/HCC): ICD-10-CM

## 2025-07-24 DIAGNOSIS — I48.91 ATRIAL FIBRILLATION, UNSPECIFIED TYPE (CMS/HCC): Primary | ICD-10-CM

## 2025-07-24 DIAGNOSIS — Z79.01 LONG TERM (CURRENT) USE OF ANTICOAGULANTS: ICD-10-CM

## 2025-07-24 LAB
QRS DURATION: 84
QT INTERVAL: 418
QTC CALCULATION(BAZETT): 396
R AXIS: 14
T WAVE AXIS: 4
VENTRICULAR RATE: 54

## 2025-07-24 PROCEDURE — 93000 ELECTROCARDIOGRAM COMPLETE: CPT | Performed by: INTERNAL MEDICINE

## 2025-07-24 PROCEDURE — 99214 OFFICE O/P EST MOD 30 MIN: CPT | Performed by: INTERNAL MEDICINE

## 2025-07-24 PROCEDURE — G8753 SYS BP > OR = 140: HCPCS | Performed by: INTERNAL MEDICINE

## 2025-07-24 PROCEDURE — G2211 COMPLEX E/M VISIT ADD ON: HCPCS | Performed by: INTERNAL MEDICINE

## 2025-07-24 PROCEDURE — G8754 DIAS BP LESS 90: HCPCS | Performed by: INTERNAL MEDICINE

## 2025-07-24 RX ORDER — WARFARIN 2.5 MG/1
2.5 TABLET ORAL 2 TIMES WEEKLY
Qty: 8 TABLET | Refills: 11 | Status: SHIPPED | OUTPATIENT
Start: 2025-07-24 | End: 2025-08-23

## 2025-07-24 RX ORDER — WARFARIN 2.5 MG/1
3.75 TABLET ORAL
Qty: 30 TABLET | Refills: 11 | Status: SHIPPED | OUTPATIENT
Start: 2025-07-24 | End: 2025-08-23

## 2025-07-24 RX ORDER — METOPROLOL SUCCINATE 50 MG/1
50 TABLET, EXTENDED RELEASE ORAL DAILY
Qty: 90 TABLET | Refills: 3 | Status: SHIPPED | OUTPATIENT
Start: 2025-07-24 | End: 2026-07-24

## 2025-07-24 RX ORDER — DIGOXIN 125 UG/1
125 TABLET ORAL 3 TIMES WEEKLY
Qty: 36 TABLET | Refills: 3 | Status: SHIPPED | OUTPATIENT
Start: 2025-07-25 | End: 2026-07-25

## 2025-07-24 ASSESSMENT — ENCOUNTER SYMPTOMS
DIZZINESS: 0
DYSPNEA ON EXERTION: 1
BRUISES/BLEEDS EASILY: 1
SYNCOPE: 0
LIGHT-HEADEDNESS: 1
NEAR-SYNCOPE: 0
PALPITATIONS: 0

## 2025-07-24 NOTE — ASSESSMENT & PLAN NOTE
The patient's exertional dyspnea is likely due to some mild diastolic dysfunction along with residual lung dysfunction from her RSV.  She states that she feels better with daily Lasix and so we will continue that medication.  I am rechecking her labs to make sure that her electrolytes are stable.  We will also recheck a BNP which was elevated at last check.

## 2025-07-24 NOTE — TELEPHONE ENCOUNTER
Pharmacy called questioning patients dose of Warfarin on Thursdays.  She has two orders one stating 1.5mg for 5 days (Sun, Tue, Wed, Thur and Sat) and she has a order for 2.5mg on mon and thurs.  Pharmacy is wanting clarification on her Thursday dose since both orders include the Thursday dose.

## 2025-07-24 NOTE — ASSESSMENT & PLAN NOTE
The patient has mild aortic valvular stenosis.  We will periodically repeat her echocardiogram for surveillance.

## 2025-07-24 NOTE — ASSESSMENT & PLAN NOTE
The patient remains in rate controlled atrial fibrillation.  She is doing well on warfarin therapy.  We did discuss possibly switching to a newer agent, Werbel, she has done quite well on this medication and she is comfortable with the dosing etc.  For now, I made no changes, and we can revisit this issue from time to time.

## 2025-07-28 ENCOUNTER — APPOINTMENT (OUTPATIENT)
Dept: LAB | Age: 80
End: 2025-07-28
Attending: INTERNAL MEDICINE
Payer: MEDICARE

## 2025-07-28 DIAGNOSIS — I48.91 ATRIAL FIBRILLATION, UNSPECIFIED TYPE (CMS/HCC): ICD-10-CM

## 2025-07-28 DIAGNOSIS — I10 ESSENTIAL HYPERTENSION, BENIGN: ICD-10-CM

## 2025-07-28 DIAGNOSIS — I50.32 CHRONIC DIASTOLIC CONGESTIVE HEART FAILURE (CMS/HCC): ICD-10-CM

## 2025-07-28 LAB
ALBUMIN SERPL-MCNC: 3.9 G/DL (ref 3.5–5.7)
ALP SERPL-CCNC: 90 IU/L (ref 34–125)
ALT SERPL-CCNC: 7 IU/L (ref 7–52)
ANION GAP SERPL CALC-SCNC: 8 MEQ/L (ref 3–15)
AST SERPL-CCNC: 20 IU/L (ref 13–39)
BASOPHILS # BLD: 0.06 K/UL (ref 0.01–0.1)
BASOPHILS NFR BLD: 0.7 %
BILIRUB SERPL-MCNC: 0.8 MG/DL (ref 0.3–1.2)
BNP SERPL-MCNC: 323 PG/ML
BUN SERPL-MCNC: 26 MG/DL (ref 7–25)
CALCIUM SERPL-MCNC: 9.2 MG/DL (ref 8.6–10.3)
CHLORIDE SERPL-SCNC: 103 MEQ/L (ref 98–107)
CO2 SERPL-SCNC: 27 MEQ/L (ref 21–31)
CREAT SERPL-MCNC: 0.7 MG/DL (ref 0.6–1.2)
DIFFERENTIAL METHOD BLD: ABNORMAL
EGFRCR SERPLBLD CKD-EPI 2021: >60 ML/MIN/1.73M*2
EOSINOPHIL # BLD: 0.4 K/UL (ref 0.04–0.36)
EOSINOPHIL NFR BLD: 5 %
ERYTHROCYTE [DISTWIDTH] IN BLOOD BY AUTOMATED COUNT: 15.8 % (ref 11.7–14.4)
GLUCOSE SERPL-MCNC: 91 MG/DL (ref 70–99)
HCT VFR BLD AUTO: 34 % (ref 35–45)
HGB BLD-MCNC: 11 G/DL (ref 11.8–15.7)
IMM GRANULOCYTES # BLD AUTO: 0.02 K/UL (ref 0–0.08)
IMM GRANULOCYTES NFR BLD AUTO: 0.2 %
LYMPHOCYTES # BLD: 1.34 K/UL (ref 1.2–3.5)
LYMPHOCYTES NFR BLD: 16.7 %
MCH RBC QN AUTO: 27.8 PG (ref 28–33.2)
MCHC RBC AUTO-ENTMCNC: 32.4 G/DL (ref 32.2–35.5)
MCV RBC AUTO: 86.1 FL (ref 83–98)
MONOCYTES # BLD: 0.78 K/UL (ref 0.28–0.8)
MONOCYTES NFR BLD: 9.7 %
NEUTROPHILS # BLD: 5.44 K/UL (ref 1.7–7)
NEUTS SEG NFR BLD: 67.7 %
NRBC BLD-RTO: 0 %
PLATELET # BLD AUTO: 322 K/UL (ref 150–369)
PMV BLD AUTO: 9.8 FL (ref 9.4–12.3)
POTASSIUM SERPL-SCNC: 4.3 MEQ/L (ref 3.5–5.1)
PROT SERPL-MCNC: 6.6 G/DL (ref 6–8.2)
RBC # BLD AUTO: 3.95 M/UL (ref 3.93–5.22)
SODIUM SERPL-SCNC: 138 MEQ/L (ref 136–145)
WBC # BLD AUTO: 8.04 K/UL (ref 3.8–10.5)

## 2025-07-28 PROCEDURE — 80053 COMPREHEN METABOLIC PANEL: CPT

## 2025-07-28 PROCEDURE — 85025 COMPLETE CBC W/AUTO DIFF WBC: CPT

## 2025-07-28 PROCEDURE — 36415 COLL VENOUS BLD VENIPUNCTURE: CPT

## 2025-07-28 PROCEDURE — 83880 ASSAY OF NATRIURETIC PEPTIDE: CPT

## 2025-07-30 ENCOUNTER — APPOINTMENT (OUTPATIENT)
Dept: LAB | Age: 80
End: 2025-07-30
Attending: INTERNAL MEDICINE
Payer: MEDICARE

## 2025-07-30 DIAGNOSIS — I48.91 ATRIAL FIBRILLATION, UNSPECIFIED TYPE (CMS/HCC): ICD-10-CM

## 2025-07-30 LAB
INR PPP: 1.7
PROTHROMBIN TIME: 19.9 SEC (ref 12.2–14.5)

## 2025-07-30 PROCEDURE — 36415 COLL VENOUS BLD VENIPUNCTURE: CPT

## 2025-07-30 PROCEDURE — 85610 PROTHROMBIN TIME: CPT

## 2025-07-31 ENCOUNTER — ANTICOAGULATION VISIT (OUTPATIENT)
Dept: CARDIOLOGY | Facility: CLINIC | Age: 80
End: 2025-07-31
Payer: MEDICARE

## 2025-07-31 DIAGNOSIS — I48.91 ATRIAL FIBRILLATION, UNSPECIFIED TYPE (CMS/HCC): Primary | ICD-10-CM

## 2025-07-31 DIAGNOSIS — Z79.01 LONG TERM (CURRENT) USE OF ANTICOAGULANTS: ICD-10-CM

## 2025-07-31 LAB — INR PPP: 1.7

## 2025-07-31 NOTE — PROGRESS NOTES
Relayed the message to Mrs. Monzon. She will take the Coumadin 2.5 mg on  M-W-F and the other days take 5 mg ( T-TH- S-S) And repeat the INR in 2 weeks, BB

## 2025-08-14 ENCOUNTER — ANTICOAGULATION VISIT (OUTPATIENT)
Dept: CARDIOLOGY | Facility: CLINIC | Age: 80
End: 2025-08-14
Payer: MEDICARE

## 2025-08-14 ENCOUNTER — TELEPHONE (OUTPATIENT)
Dept: CARDIOLOGY | Facility: CLINIC | Age: 80
End: 2025-08-14
Payer: MEDICARE

## 2025-08-14 ENCOUNTER — APPOINTMENT (OUTPATIENT)
Dept: LAB | Age: 80
End: 2025-08-14
Attending: INTERNAL MEDICINE
Payer: MEDICARE

## 2025-08-14 DIAGNOSIS — Z79.01 LONG TERM (CURRENT) USE OF ANTICOAGULANTS: ICD-10-CM

## 2025-08-14 DIAGNOSIS — I48.91 ATRIAL FIBRILLATION, UNSPECIFIED TYPE (CMS/HCC): Primary | ICD-10-CM

## (undated) DEVICE — BAG BANDED 44X36

## (undated) DEVICE — SET MICROPUNTURE INTRO 5FR

## (undated) DEVICE — KIT ACIST MANIFOLD TRANSDR

## (undated) DEVICE — GUIDEWIRE BMW UNIVERSAL 190CM STRAIGHT

## (undated) DEVICE — CATH 5FR FR4

## (undated) DEVICE — TR BAND REGULAR

## (undated) DEVICE — DRESSING TEGADERM 4X4 3/4

## (undated) DEVICE — CONTROLLER ACIST PREMIUM HAND

## (undated) DEVICE — CATHETER DIAGNOSTIC DXTERITY 5FR L100CM

## (undated) DEVICE — KIT CATH LAB ANGIO

## (undated) DEVICE — GUIDEWIRE J 3.0MM, .035", 260CM, FIXED CORE

## (undated) DEVICE — SHEATH INTRODUCER PRELUDE IDEAL HYDROPHILIC SF 6F .021MM

## (undated) DEVICE — NEEDLE DISP HYPO 25GX5/8IN

## (undated) DEVICE — CATH INTROCAN SAFETY FEP 20G X 1IN

## (undated) DEVICE — APPLICATOR CHLORAPREP 10.5ML ORANGE TINT

## (undated) DEVICE — DRAPE APERTURE 16X16